# Patient Record
Sex: MALE | Race: WHITE | NOT HISPANIC OR LATINO | Employment: FULL TIME | URBAN - METROPOLITAN AREA
[De-identification: names, ages, dates, MRNs, and addresses within clinical notes are randomized per-mention and may not be internally consistent; named-entity substitution may affect disease eponyms.]

---

## 2017-03-07 ENCOUNTER — ALLSCRIPTS OFFICE VISIT (OUTPATIENT)
Dept: OTHER | Facility: OTHER | Age: 42
End: 2017-03-07

## 2017-04-06 ENCOUNTER — ANESTHESIA EVENT (OUTPATIENT)
Dept: GASTROENTEROLOGY | Facility: AMBULARY SURGERY CENTER | Age: 42
End: 2017-04-06
Payer: COMMERCIAL

## 2017-04-07 ENCOUNTER — GENERIC CONVERSION - ENCOUNTER (OUTPATIENT)
Dept: OTHER | Facility: OTHER | Age: 42
End: 2017-04-07

## 2017-04-07 ENCOUNTER — ANESTHESIA (OUTPATIENT)
Dept: GASTROENTEROLOGY | Facility: AMBULARY SURGERY CENTER | Age: 42
End: 2017-04-07
Payer: COMMERCIAL

## 2017-04-07 ENCOUNTER — HOSPITAL ENCOUNTER (OUTPATIENT)
Facility: AMBULARY SURGERY CENTER | Age: 42
Setting detail: OUTPATIENT SURGERY
Discharge: HOME | End: 2017-04-07
Attending: INTERNAL MEDICINE | Admitting: INTERNAL MEDICINE
Payer: COMMERCIAL

## 2017-04-07 VITALS
WEIGHT: 245 LBS | HEIGHT: 72 IN | OXYGEN SATURATION: 94 % | RESPIRATION RATE: 18 BRPM | DIASTOLIC BLOOD PRESSURE: 82 MMHG | HEART RATE: 74 BPM | SYSTOLIC BLOOD PRESSURE: 134 MMHG | BODY MASS INDEX: 33.18 KG/M2 | TEMPERATURE: 98.8 F

## 2017-04-07 DIAGNOSIS — K21.9 GASTRO-ESOPHAGEAL REFLUX DISEASE WITHOUT ESOPHAGITIS: ICD-10-CM

## 2017-04-07 DIAGNOSIS — Z80.0 FAMILY HISTORY OF MALIGNANT NEOPLASM OF DIGESTIVE ORGAN: ICD-10-CM

## 2017-04-07 DIAGNOSIS — K62.5 HEMORRHAGE OF ANUS AND RECTUM: ICD-10-CM

## 2017-04-07 DIAGNOSIS — Z86.010 HISTORY OF COLONIC POLYPS: ICD-10-CM

## 2017-04-07 PROCEDURE — 88305 TISSUE EXAM BY PATHOLOGIST: CPT | Performed by: INTERNAL MEDICINE

## 2017-04-07 RX ORDER — PROPOFOL 10 MG/ML
INJECTION, EMULSION INTRAVENOUS AS NEEDED
Status: DISCONTINUED | OUTPATIENT
Start: 2017-04-07 | End: 2017-04-07 | Stop reason: SURG

## 2017-04-07 RX ORDER — SODIUM CHLORIDE, SODIUM LACTATE, POTASSIUM CHLORIDE, CALCIUM CHLORIDE 600; 310; 30; 20 MG/100ML; MG/100ML; MG/100ML; MG/100ML
125 INJECTION, SOLUTION INTRAVENOUS CONTINUOUS
Status: DISCONTINUED | OUTPATIENT
Start: 2017-04-07 | End: 2017-04-07 | Stop reason: HOSPADM

## 2017-04-07 RX ORDER — PANTOPRAZOLE SODIUM 40 MG/1
40 TABLET, DELAYED RELEASE ORAL DAILY
Qty: 30 TABLET | Refills: 0 | Status: SHIPPED | OUTPATIENT
Start: 2017-04-07 | End: 2018-10-08

## 2017-04-07 RX ADMIN — SODIUM CHLORIDE, SODIUM LACTATE, POTASSIUM CHLORIDE, AND CALCIUM CHLORIDE: .6; .31; .03; .02 INJECTION, SOLUTION INTRAVENOUS at 08:47

## 2017-04-07 RX ADMIN — PROPOFOL 50 MG: 10 INJECTION, EMULSION INTRAVENOUS at 08:55

## 2017-04-07 RX ADMIN — PROPOFOL 50 MG: 10 INJECTION, EMULSION INTRAVENOUS at 09:06

## 2017-04-07 RX ADMIN — PROPOFOL 50 MG: 10 INJECTION, EMULSION INTRAVENOUS at 09:03

## 2017-04-07 RX ADMIN — PROPOFOL 50 MG: 10 INJECTION, EMULSION INTRAVENOUS at 08:57

## 2017-04-07 RX ADMIN — PROPOFOL 100 MG: 10 INJECTION, EMULSION INTRAVENOUS at 08:53

## 2017-04-07 RX ADMIN — PROPOFOL 50 MG: 10 INJECTION, EMULSION INTRAVENOUS at 08:59

## 2017-04-18 ENCOUNTER — GENERIC CONVERSION - ENCOUNTER (OUTPATIENT)
Dept: OTHER | Facility: OTHER | Age: 42
End: 2017-04-18

## 2018-01-14 VITALS
WEIGHT: 257 LBS | RESPIRATION RATE: 16 BRPM | SYSTOLIC BLOOD PRESSURE: 122 MMHG | HEIGHT: 72 IN | TEMPERATURE: 99.2 F | OXYGEN SATURATION: 97 % | DIASTOLIC BLOOD PRESSURE: 74 MMHG | BODY MASS INDEX: 34.81 KG/M2 | HEART RATE: 80 BPM

## 2018-01-15 NOTE — RESULT NOTES
Verified Results  (1) TISSUE EXAM 07Apr2017 08:56AM Abdullahi Garcia     Test Name Result Flag Reference   LAB AP CASE REPORT (Report)     Surgical Pathology Report             Case: [id number]                   Authorizing Provider: Alondra Schneider MD     Collected:      04/07/2017 0856        Ordering Location:   [facility]  Received:      04/07/2017 [facility]                                     Pathologist:      Amando Zaman MD                             Specimens:  A) - Stomach, bx gastric body                                     B) - Polyp, Colorectal, polyp hepatic flexure   LAB AP FINAL DIAGNOSIS (Report)     A  Gastric body, biopsy:    - Chronic active gastritis  - Many Helicobacter pylori organisms identified on routine H&E stain     - Negative for intestinal metaplasia, dysplasia and malignancy  B  Colon, hepatic flexure polyp:    - Tubular adenoma  - Negative for high grade dysplasia and malignancy  Electronically signed by Amando Zaman MD on 4/10/2017 at 12:22 PM   LAB AP NOTE      Interpretation performed at [facility], [place]   LAB AP SURGICAL ADDITIONAL INFORMATION (Report)     These tests were developed and their performance characteristics   determined by [facility] or [facility]  They may not be cleared or approved by the [place]  Food and   Drug Administration  The FDA has determined that such clearance or   approval is not necessary  These tests are used for clinical purposes  They should not be regarded as investigational or for research  This   laboratory has been approved by CLIA 88, designated as a high-complexity   laboratory and is qualified to perform these tests  LAB AP GROSS DESCRIPTION (Report)     A  The specimen is received in formalin, labeled with the patient's name   and hospital number, and is designated biopsy gastric body   The   specimen consists of 3 tan soft tissue fragments measuring 0 2, 0 2 and   0 5 cm  Entirely submitted  One cassette  Note: The estimated total formalin fixation time based upon information   provided by the submitting clinician and the standard processing schedule   is 19 5 hours  B  The specimen is received in formalin, labeled with the patient's name   and hospital number, and is designated polyp hepatic flexure  The   specimen consists of 3 tan soft tissue fragments measuring 0 2, 0 3 and   0 5 cm  Entirely submitted  One cassette  Note: The estimated total formalin fixation time based upon information   provided by the submitting clinician and the standard processing schedule   is 19 25 hours      MAC       Plan  Helicobacter pylori gastritis    · Start: Amoxicillin 500 MG Oral Tablet; TAKE 2 TABLETS TWICE DAILY UNTIL GONE   · Start: Clarithromycin 500 MG Oral Tablet (Biaxin); TAKE 1 TABLET TWICE DAILY UNTIL  FINISHED   · Start: Pantoprazole Sodium 40 MG Oral Tablet Delayed Release (Protonix); 1 tablet PO  bid for 2 weeks and then take 1 tab PO half an hour before breakfast

## 2018-10-08 ENCOUNTER — HOSPITAL ENCOUNTER (EMERGENCY)
Facility: HOSPITAL | Age: 43
Discharge: HOME | End: 2018-10-08
Attending: EMERGENCY MEDICINE | Admitting: EMERGENCY MEDICINE
Payer: COMMERCIAL

## 2018-10-08 VITALS
OXYGEN SATURATION: 97 % | SYSTOLIC BLOOD PRESSURE: 164 MMHG | DIASTOLIC BLOOD PRESSURE: 81 MMHG | TEMPERATURE: 98 F | BODY MASS INDEX: 33.86 KG/M2 | HEART RATE: 81 BPM | RESPIRATION RATE: 18 BRPM | WEIGHT: 250 LBS | HEIGHT: 72 IN

## 2018-10-08 DIAGNOSIS — L03.011 PARONYCHIA OF FINGER, RIGHT: Primary | ICD-10-CM

## 2018-10-08 PROCEDURE — 99283 EMERGENCY DEPT VISIT LOW MDM: CPT

## 2018-10-08 RX ORDER — LIDOCAINE HYDROCHLORIDE 20 MG/ML
4 INJECTION, SOLUTION EPIDURAL; INFILTRATION; INTRACAUDAL; PERINEURAL ONCE
Status: COMPLETED | OUTPATIENT
Start: 2018-10-08 | End: 2018-10-08

## 2018-10-08 RX ORDER — CEPHALEXIN 500 MG/1
500 CAPSULE ORAL EVERY 6 HOURS SCHEDULED
Qty: 20 CAPSULE | Refills: 0 | Status: SHIPPED | OUTPATIENT
Start: 2018-10-08 | End: 2018-10-13

## 2018-10-08 RX ORDER — CEPHALEXIN 500 MG/1
500 CAPSULE ORAL ONCE
Status: COMPLETED | OUTPATIENT
Start: 2018-10-08 | End: 2018-10-08

## 2018-10-08 RX ADMIN — CEPHALEXIN 500 MG: 500 CAPSULE ORAL at 04:20

## 2018-10-08 RX ADMIN — LIDOCAINE HYDROCHLORIDE 4 ML: 20 INJECTION, SOLUTION EPIDURAL; INFILTRATION; INTRACAUDAL; PERINEURAL at 04:21

## 2018-10-08 NOTE — DISCHARGE INSTRUCTIONS
Paronychia   WHAT YOU NEED TO KNOW:   What is paronychia? Paronychia is an infection of your nail fold caused by bacteria or a fungus  The nail fold is the skin around your nail  Paronychia may happen suddenly and last for 6 weeks or longer  You may have paronychia on more than 1 finger or toe  What increases my risk for paronychia? · Trauma:  Any injury that causes your skin to tear can lead to infection  Your risk is increased if you have ingrown nails, bite your nails, or wear acrylic nails  · Frequent contact with water:  Jobs that require you to soak your hands in water often may increase your risk for paronychia  Common examples are nurses, cooks, and   Swimmers also have increased risk  · Medical conditions:  Diabetes and other conditions that cause a weak immune system can increase your risk  Some examples are skin cancer, psoriasis, HIV, and lupus  · Chemicals:  Contact with soaps, detergents, and other chemicals can cause inflammation and lead to paronychia  · Allergies: Allergies to certain foods, nail polish, or latex can cause inflammation and increase your risk  What are the signs and symptoms of paronychia? · Red, hot, swollen, painful nail fold    · Pus coming out of your nail fold when you press on it    · Nail that pulls away from your nail fold and may fall off    · Changes in nail color, such as green nails    · Fever    · Thick, rough nail, or ridges in the nail  How is paronychia diagnosed? Your healthcare provider will examine your nails and ask about your symptoms  He may press on your infected nail to see if pus drains from it  He will send any pus to a lab for tests to learn what germ is causing your infection  This is called a fluid culture  How is paronychia treated? · Medicine:     ¨ Td vaccine  is a booster shot used to help prevent tetanus and diphtheria   The Td booster may be given to adolescents and adults every 10 years or for certain wounds and injuries  ¨ Antibiotics: This medicine will help fight or prevent an infection caused by bacteria  It may be given as a pill, cream, or ointment  ¨ Steroids: This medicine will help decrease inflammation  It may be given as a pill, cream, or ointment  ¨ Antifungal medicine: This medicine helps kill fungus that may be causing your infection  It may be given as a cream or ointment  ¨ NSAIDs:  These medicines decrease pain and swelling  NSAIDs are available without a doctor's order  Ask your healthcare provider which medicine is right for you  Ask how much to take and when to take it  Take as directed  NSAIDs can cause stomach bleeding and kidney problems if not taken correctly  · Procedures: You may need surgery to drain an abscess (pus pocket) in your finger or toe  Your nail may need to be removed  Infected tissue around your nail may also need to be removed  What are the risks of paronychia? Your nail may become loose, deformed, or fall off  The infection may form a large abscess on your nail  The infection may spread to nearby tissue and bone  How can paronychia be prevented? · Avoid chemicals and allergens that may harm your skin and nails  This includes soaps, laundry detergents, and nail products  · Keep your nails clean and dry  Do not soak your nails in water  Use cotton-lined rubber gloves or wear 2 rubber gloves if you work with food or water  The gloves will help protect your nail folds  · Keep your nails short  Do not bite your nails, pick at your hangnails, suck your fingers, or wear fake nails  Bring your own nail tools when you go to the nail salon  How can I manage my symptoms? · Soak your nail:  Soak your nail in a mixture of equal parts vinegar and water 3 or 4 times each day  This will help decrease inflammation  · Apply a warm compress:  Soak a washcloth in warm water and place it on your nail  This will help decrease inflammation       · Elevate:  Raise your nail above the level of your heart as often as you can  This will help decrease swelling and pain  Prop your nail on pillows or blankets to keep it elevated comfortably  · Use lotion:  Apply lotion after you wash your hands  This will prevent the skin from becoming too dry  When should I contact my healthcare provider? · Your nail becomes loose, deformed, or falls off  · You have a large abscess on your nail  · You have questions or concerns about your condition or care  When should I seek immediate care? · You have severe nail pain  · The inflammation spreads to your hand or arm  CARE AGREEMENT:   You have the right to help plan your care  Learn about your health condition and how it may be treated  Discuss treatment options with your caregivers to decide what care you want to receive  You always have the right to refuse treatment  The above information is an  only  It is not intended as medical advice for individual conditions or treatments  Talk to your doctor, nurse or pharmacist before following any medical regimen to see if it is safe and effective for you  © 2017 [employer]  Information is for End User's use only and may not be sold, redistributed or otherwise used for commercial purposes  All illustrations and images included in CareNotes® are the copyrighted property of A D A M , Inc  or [employer]

## 2018-10-08 NOTE — ED PROVIDER NOTES
History  Chief Complaint   Patient presents with    Finger Swelling     patient c/o parenychia right index finger from biting nails     Pt in ER with c/o pain and swelling to right forefinger that began approx 1wk ago  Pt admits to nailbiting  Pt requests a digital block prior to I & D            None       Past Medical History:   Diagnosis Date    Chronic pain disorder     lumbar     GERD (gastroesophageal reflux disease)     diet controlled    PONV (postoperative nausea and vomiting)     Sleep apnea     does not wear CPAP       Past Surgical History:   Procedure Laterality Date    COLONOSCOPY N/A 4/7/2017    Procedure: COLONOSCOPY;  Surgeon: Cecil Ayala MD;  Location: [facility] GI LAB; Service:     ESOPHAGOGASTRODUODENOSCOPY N/A 4/7/2017    Procedure: ESOPHAGOGASTRODUODENOSCOPY (EGD); Surgeon: Cecil Ayala MD;  Location: [facility] GI LAB; Service:     JOINT REPLACEMENT      KNEE ARTHROSCOPY Left     x2    KNEE ARTHROSCOPY Right     x1    KNEE ARTHROSCOPY W/ AUTOGENOUS CARTILAGE IMPLANTATION (ACI) PROCEDURE Left     SINUS SURGERY      TONSILLECTOMY         Family History   Problem Relation Age of Onset    No Known Problems Mother     No Known Problems Father     Cancer Sister         breast     I have reviewed and agree with the history as documented  Social History   Substance Use Topics    Smoking status: Former Smoker     Packs/day: 0 50     Years: 5 00     Quit date: 2012    Smokeless tobacco: Never Used    Alcohol use Yes      Comment: occas        Review of Systems   Skin: Positive for color change  All other systems reviewed and are negative  Physical Exam  Physical Exam   Constitutional: He appears well-developed and well-nourished  No distress  HENT:   Head: Normocephalic and atraumatic  Musculoskeletal: He exhibits tenderness  He exhibits no deformity  Hands:  Paronychia noted to lateral tip of 2nd finger, with overlying cellulitis   No drainage   Nursing note and vitals reviewed  Vital Signs  ED Triage Vitals [10/08/18 0151]   Temperature Pulse Respirations Blood Pressure SpO2   98 °F (36 7 °C) 81 18 164/81 97 %      Temp Source Heart Rate Source Patient Position - Orthostatic VS BP Location FiO2 (%)   Oral Monitor Lying Right arm --      Pain Score       4           Vitals:    10/08/18 0151   BP: 164/81   Pulse: 81   Patient Position - Orthostatic VS: Lying       Visual Acuity      ED Medications  Medications   lidocaine (PF) (XYLOCAINE-MPF) 2 % injection 4 mL (4 mL Infiltration Given 10/8/18 0421)   cephalexin (KEFLEX) capsule 500 mg (500 mg Oral Given 10/8/18 0420)       Diagnostic Studies  Results Reviewed     None                 No orders to display              Procedures  Incision/Drainage  Date/Time: 10/8/2018 4:25 AM  Performed by: Palmira Jones by: Sherlie Kocher     Patient location:  ED  Other Assisting Provider: No    Consent:     Consent obtained:  Verbal    Consent given by:  Patient    Risks discussed:  Bleeding, incomplete drainage and pain    Alternatives discussed:  No treatment  Universal protocol:     Procedure explained and questions answered to patient or proxy's satisfaction: yes      Site/side marked: yes      Immediately prior to procedure a time out was called: yes      Patient identity confirmed:  Verbally with patient  Location:     Type:  Abscess and fluid collection    Location:  Upper extremity    Upper extremity location:  R index finger  Pre-procedure details:     Skin preparation:  Chloraprep  Anesthesia (see MAR for exact dosages):      Anesthesia method:  Nerve block    Block needle gauge:  27 G    Block anesthetic:  Lidocaine 2% w/o epi    Block injection procedure:  Anatomic landmarks identified, introduced needle, negative aspiration for blood, anatomic landmarks palpated and incremental injection    Block outcome:  Incomplete block  Procedure details:     Complexity:  Simple    Needle aspiration: no Incision types:  Stab incision    Scalpel blade:  11    Approach:  Open    Incision depth:  Subcutaneous    Drainage:  Purulent    Drainage amount: Moderate    Wound treatment:  Wound left open    Packing materials:  None  Post-procedure details:     Patient tolerance of procedure: Tolerated well, no immediate complications           Phone Contacts  ED Phone Contact    ED Course                               MDM  CritCare Time    Disposition  Final diagnoses:   Paronychia of finger, right     Time reflects when diagnosis was documented in both MDM as applicable and the Disposition within this note     Time User Action Codes Description Comment    10/8/2018  4:39 AM Veronique Lobato Add [L03 011] Paronychia of finger, right       ED Disposition     ED Disposition Condition Comment    Discharge  Claudia Loya discharge to home/self care  Condition at discharge: Stable        Follow-up Information     Follow up With Specialties Details Why Pham Holloway  Call in 1 day For wound re-check, for follow up [id number] [facility]          Discharge Medication List as of 10/8/2018  4:41 AM      START taking these medications    Details   cephalexin (KEFLEX) 500 mg capsule Take 1 capsule (500 mg total) by mouth every 6 (six) hours for 5 days, Starting Mon 10/8/2018, Until Sat 10/13/2018, Normal           No discharge procedures on file      ED Provider  Electronically Signed by           Adriana Durbin DO  10/11/18 1134

## 2019-04-08 ENCOUNTER — TRANSCRIBE ORDERS (OUTPATIENT)
Dept: ADMINISTRATIVE | Facility: HOSPITAL | Age: 44
End: 2019-04-08

## 2019-04-09 ENCOUNTER — TRANSCRIBE ORDERS (OUTPATIENT)
Dept: ADMINISTRATIVE | Facility: HOSPITAL | Age: 44
End: 2019-04-09

## 2019-04-09 DIAGNOSIS — M27.9 LESION OF MANDIBLE: Primary | ICD-10-CM

## 2021-07-22 ENCOUNTER — OFFICE VISIT (OUTPATIENT)
Dept: URGENT CARE | Facility: CLINIC | Age: 46
End: 2021-07-22
Payer: COMMERCIAL

## 2021-07-22 ENCOUNTER — TELEPHONE (OUTPATIENT)
Dept: URGENT CARE | Facility: CLINIC | Age: 46
End: 2021-07-22

## 2021-07-22 VITALS
TEMPERATURE: 98.9 F | DIASTOLIC BLOOD PRESSURE: 82 MMHG | WEIGHT: 277 LBS | HEART RATE: 80 BPM | RESPIRATION RATE: 18 BRPM | BODY MASS INDEX: 37.57 KG/M2 | SYSTOLIC BLOOD PRESSURE: 140 MMHG

## 2021-07-22 DIAGNOSIS — M54.41 ACUTE RIGHT-SIDED LOW BACK PAIN WITH RIGHT-SIDED SCIATICA: Primary | ICD-10-CM

## 2021-07-22 PROCEDURE — 99203 OFFICE O/P NEW LOW 30 MIN: CPT | Performed by: PHYSICIAN ASSISTANT

## 2021-07-22 RX ORDER — MELOXICAM 15 MG/1
15 TABLET ORAL DAILY
Qty: 14 TABLET | Refills: 0 | Status: SHIPPED | OUTPATIENT
Start: 2021-07-22 | End: 2021-12-21

## 2021-07-22 RX ORDER — MELOXICAM 7.5 MG/1
15 TABLET ORAL DAILY
Qty: 14 TABLET | Refills: 0 | Status: SHIPPED | OUTPATIENT
Start: 2021-07-22 | End: 2021-07-22

## 2021-07-22 RX ORDER — CYCLOBENZAPRINE HCL 10 MG
10 TABLET ORAL
Qty: 14 TABLET | Refills: 0 | Status: SHIPPED | OUTPATIENT
Start: 2021-07-22 | End: 2021-07-22

## 2021-07-22 RX ORDER — CYCLOBENZAPRINE HCL 10 MG
10 TABLET ORAL
Qty: 14 TABLET | Refills: 0 | Status: SHIPPED | OUTPATIENT
Start: 2021-07-22 | End: 2021-12-21

## 2021-07-22 NOTE — PROGRESS NOTES
[facility]        NAME: Steven Corey is a 51 y o  male  : 1975    MRN: [id number]  DATE: 2021  TIME: 12:59 PM    Assessment and Plan   Acute right-sided low back pain with right-sided sciatica [M54 41]  1  Acute right-sided low back pain with right-sided sciatica  meloxicam (MOBIC) 7 5 mg tablet    cyclobenzaprine (FLEXERIL) 10 mg tablet         Patient Instructions     Patient Instructions   Take NSAID as prescribed for suspected right sided sciatica  Use muscle relaxer at night as needed for discomfort/spasms  Ice/heat to area as tolerated  Avoid aggravating factors such as squatting, bending or lifting  Follow up with PCP  Return or be seen in ER with progressing or worsening symptoms  Follow up with PCP in 3-5 days  Proceed to  ER if symptoms worsen  Chief Complaint   No chief complaint on file  History of Present Illness       Patient is a 52yo M presenting today with R sided low back pain x 1 week  States he was wrestling with his son when he felt a "popping" sensation in his R lower back  He reports a history of back pain in which he has seen a chiropractor and his PCP for his symptoms  Denies ever having imaging done of his back  After experiencing "popping" sensation he notes pain has gradually worsened, is experiencing some tingling down the back of his R leg  States the pain as feeling achy, worse with bending, squatting or prolonged periods of sitting, has been taking aleve with minimal relief  Notes he has been struggling to sleep at night due to the discomfort  He denies any gait abnormalities, weakness, loss of ROM, urinary/bowel incontinence, bruising or swelling  Review of Systems   Review of Systems   Constitutional: Negative for fever  HENT: Negative for sore throat  Eyes: Negative for pain  Respiratory: Negative for cough and shortness of breath  Cardiovascular: Negative for chest pain  Gastrointestinal: Negative for abdominal pain  Genitourinary: Negative for decreased urine volume and difficulty urinating  Musculoskeletal: Positive for back pain (R lower)  Neurological: Negative for weakness and headaches         "tingling" sensation down back of R leg into great toe  Current Medications       Current Outpatient Medications:     cyclobenzaprine (FLEXERIL) 10 mg tablet, Take 1 tablet (10 mg total) by mouth daily at bedtime for 14 days, Disp: 14 tablet, Rfl: 0    meloxicam (MOBIC) 7 5 mg tablet, Take 2 tablets (15 mg total) by mouth daily, Disp: 14 tablet, Rfl: 0    Current Allergies     Allergies as of 07/22/2021 - Reviewed 10/08/2018   Allergen Reaction Noted    Tetracycline Rash 04/05/2017            The following portions of the patient's history were reviewed and updated as appropriate: allergies, current medications, past family history, past medical history, past social history, past surgical history and problem list      Past Medical History:   Diagnosis Date    Chronic pain disorder     lumbar     GERD (gastroesophageal reflux disease)     diet controlled    PONV (postoperative nausea and vomiting)     Sleep apnea     does not wear CPAP       Past Surgical History:   Procedure Laterality Date    COLONOSCOPY N/A 4/7/2017    Procedure: COLONOSCOPY;  Surgeon: Bc Ardon MD;  Location[facility] GI LAB; Service:     ESOPHAGOGASTRODUODENOSCOPY N/A 4/7/2017    Procedure: ESOPHAGOGASTRODUODENOSCOPY (EGD); Surgeon: Bc Ardon MD;  Location: [facility] GI LAB; Service:     JOINT REPLACEMENT      KNEE ARTHROSCOPY Left     x2    KNEE ARTHROSCOPY Right     x1    KNEE ARTHROSCOPY W/ AUTOGENOUS CARTILAGE IMPLANTATION (ACI) PROCEDURE Left     SINUS SURGERY      TONSILLECTOMY         Family History   Problem Relation Age of Onset    No Known Problems Mother     No Known Problems Father     Cancer Sister         breast         Medications have been verified          Objective   /82 (BP Location: Left arm, Patient Position: Standing)   Pulse 80   Temp 98 9 °F (37 2 °C)   Resp 18   Wt 126 kg (277 lb)   BMI 37 57 kg/m²        Physical Exam     Physical Exam  Vitals reviewed  Constitutional:       General: He is not in acute distress  Appearance: Normal appearance  Comments: Patient appears uncomfortable, constantly adjusting position in seat, states too uncomfortable to climb onto exam table  HENT:      Head: Normocephalic and atraumatic  Right Ear: External ear normal       Left Ear: External ear normal    Cardiovascular:      Rate and Rhythm: Normal rate and regular rhythm  Pulses: Normal pulses  Pulmonary:      Effort: Pulmonary effort is normal    Musculoskeletal:      Cervical back: Normal range of motion  Comments: No obvious deformity, bruising or swelling of low back  No spinous process tenderness  R sided tenderness of low back, spasm elicited upon palpation of R upper buttock  5/5 strength of lower extremities bilaterally  Mild tenderness upon flexion and extension of R hip  No gait abnormalities  Gross sensation intact  Normal DTRs  Skin:     General: Skin is warm and dry  Capillary Refill: Capillary refill takes less than 2 seconds  Neurological:      General: No focal deficit present  Mental Status: He is alert and oriented to person, place, and time  Sensory: No sensory deficit  Motor: No weakness        Gait: Gait normal       Deep Tendon Reflexes: Reflexes normal

## 2021-07-23 DIAGNOSIS — M54.41 ACUTE RIGHT-SIDED LOW BACK PAIN WITH RIGHT-SIDED SCIATICA: Primary | ICD-10-CM

## 2021-07-23 RX ORDER — METHYLPREDNISOLONE 4 MG/1
TABLET ORAL
Qty: 21 TABLET | Refills: 0 | Status: SHIPPED | OUTPATIENT
Start: 2021-07-23 | End: 2021-12-21

## 2021-07-23 NOTE — TELEPHONE ENCOUNTER
Patient called explaining persistent pain in back  Discussed with patient adding on steroid pack for symptoms during visit on 7/22  Sent prescription to pharmacy

## 2021-11-05 ENCOUNTER — CONSULT (OUTPATIENT)
Dept: GASTROENTEROLOGY | Facility: CLINIC | Age: 46
End: 2021-11-05
Payer: COMMERCIAL

## 2021-11-05 ENCOUNTER — TELEPHONE (OUTPATIENT)
Dept: GASTROENTEROLOGY | Facility: CLINIC | Age: 46
End: 2021-11-05

## 2021-11-05 VITALS
BODY MASS INDEX: 37.93 KG/M2 | WEIGHT: 280 LBS | TEMPERATURE: 99 F | HEART RATE: 80 BPM | DIASTOLIC BLOOD PRESSURE: 80 MMHG | HEIGHT: 72 IN | SYSTOLIC BLOOD PRESSURE: 120 MMHG

## 2021-11-05 DIAGNOSIS — R10.13 EPIGASTRIC PAIN: ICD-10-CM

## 2021-11-05 DIAGNOSIS — K62.5 RECTAL BLEEDING: Primary | ICD-10-CM

## 2021-11-05 PROCEDURE — 99203 OFFICE O/P NEW LOW 30 MIN: CPT | Performed by: PHYSICIAN ASSISTANT

## 2021-12-21 ENCOUNTER — TELEPHONE (OUTPATIENT)
Dept: PREADMISSION TESTING | Facility: HOSPITAL | Age: 46
End: 2021-12-21

## 2021-12-21 NOTE — PRE-PROCEDURE INSTRUCTIONS
No outpatient medications have been marked as taking for the 12/21/21 encounter (Telephone) with Anabel Wetzel RN  Pt is not taking any Rx nor OTC medications at this time

## 2022-01-04 ENCOUNTER — HOSPITAL ENCOUNTER (OUTPATIENT)
Dept: GASTROENTEROLOGY | Facility: AMBULARY SURGERY CENTER | Age: 47
Setting detail: OUTPATIENT SURGERY
Discharge: HOME | End: 2022-01-04
Attending: INTERNAL MEDICINE | Admitting: INTERNAL MEDICINE
Payer: COMMERCIAL

## 2022-01-04 ENCOUNTER — ANESTHESIA (OUTPATIENT)
Dept: GASTROENTEROLOGY | Facility: AMBULARY SURGERY CENTER | Age: 47
End: 2022-01-04

## 2022-01-04 ENCOUNTER — ANESTHESIA EVENT (OUTPATIENT)
Dept: GASTROENTEROLOGY | Facility: AMBULARY SURGERY CENTER | Age: 47
End: 2022-01-04

## 2022-01-04 VITALS
DIASTOLIC BLOOD PRESSURE: 62 MMHG | RESPIRATION RATE: 18 BRPM | TEMPERATURE: 97.3 F | SYSTOLIC BLOOD PRESSURE: 119 MMHG | HEART RATE: 77 BPM | OXYGEN SATURATION: 98 % | BODY MASS INDEX: 37.97 KG/M2 | HEIGHT: 72 IN

## 2022-01-04 DIAGNOSIS — R10.13 EPIGASTRIC PAIN: ICD-10-CM

## 2022-01-04 DIAGNOSIS — K62.5 RECTAL BLEEDING: ICD-10-CM

## 2022-01-04 PROCEDURE — 43239 EGD BIOPSY SINGLE/MULTIPLE: CPT | Performed by: INTERNAL MEDICINE

## 2022-01-04 PROCEDURE — 88305 TISSUE EXAM BY PATHOLOGIST: CPT | Performed by: PATHOLOGY

## 2022-01-04 PROCEDURE — 88342 IMHCHEM/IMCYTCHM 1ST ANTB: CPT | Performed by: PATHOLOGY

## 2022-01-04 PROCEDURE — 45378 DIAGNOSTIC COLONOSCOPY: CPT | Performed by: INTERNAL MEDICINE

## 2022-01-04 RX ORDER — LIDOCAINE HYDROCHLORIDE 20 MG/ML
INJECTION, SOLUTION EPIDURAL; INFILTRATION; INTRACAUDAL; PERINEURAL AS NEEDED
Status: DISCONTINUED | OUTPATIENT
Start: 2022-01-04 | End: 2022-01-04

## 2022-01-04 RX ORDER — PROPOFOL 10 MG/ML
INJECTION, EMULSION INTRAVENOUS CONTINUOUS PRN
Status: DISCONTINUED | OUTPATIENT
Start: 2022-01-04 | End: 2022-01-04

## 2022-01-04 RX ORDER — PROPOFOL 10 MG/ML
INJECTION, EMULSION INTRAVENOUS AS NEEDED
Status: DISCONTINUED | OUTPATIENT
Start: 2022-01-04 | End: 2022-01-04

## 2022-01-04 RX ORDER — SODIUM CHLORIDE, SODIUM LACTATE, POTASSIUM CHLORIDE, CALCIUM CHLORIDE 600; 310; 30; 20 MG/100ML; MG/100ML; MG/100ML; MG/100ML
125 INJECTION, SOLUTION INTRAVENOUS CONTINUOUS
Status: DISCONTINUED | OUTPATIENT
Start: 2022-01-04 | End: 2022-01-08 | Stop reason: HOSPADM

## 2022-01-04 RX ADMIN — LIDOCAINE HYDROCHLORIDE 100 MG: 20 INJECTION, SOLUTION EPIDURAL; INFILTRATION; INTRACAUDAL; PERINEURAL at 11:14

## 2022-01-04 RX ADMIN — PROPOFOL 140 MCG/KG/MIN: 10 INJECTION, EMULSION INTRAVENOUS at 11:14

## 2022-01-04 RX ADMIN — PROPOFOL 100 MG: 10 INJECTION, EMULSION INTRAVENOUS at 11:14

## 2022-01-04 RX ADMIN — SODIUM CHLORIDE, SODIUM LACTATE, POTASSIUM CHLORIDE, AND CALCIUM CHLORIDE 125 ML/HR: .6; .31; .03; .02 INJECTION, SOLUTION INTRAVENOUS at 09:56

## 2022-01-04 RX ADMIN — PROPOFOL 200 MG: 10 INJECTION, EMULSION INTRAVENOUS at 11:11

## 2022-01-04 NOTE — H&P
History and Physical - [facility]  [facility] 51 y o  male MRN: [id number]        HPI:  59-year-old male history of H pylori gastritis, colon polyps reports having epigastric pain and rectal bleeding mixed with the stool  Historical Information   Past Medical History:   Diagnosis Date    Chronic pain disorder     lumbar     CPAP (continuous positive airway pressure) dependence     GERD (gastroesophageal reflux disease)     diet controlled    Sleep apnea     does not wear CPAP     Past Surgical History:   Procedure Laterality Date    COLONOSCOPY N/A 4/7/2017    Procedure: COLONOSCOPY;  Surgeon: Ashley Shirley MD;  Location: [place] [address] GI LAB; Service:     ESOPHAGOGASTRODUODENOSCOPY N/A 4/7/2017    Procedure: ESOPHAGOGASTRODUODENOSCOPY (EGD); Surgeon: Ashley Shirley MD;  Location: [facility] GI LAB; Service:     KNEE ARTHROSCOPY Left     x4 and ACL repair    KNEE ARTHROSCOPY Right     x2 and ACL repair    KNEE ARTHROSCOPY W/ AUTOGENOUS CARTILAGE IMPLANTATION (ACI) PROCEDURE Left     SINUS SURGERY      TONSILLECTOMY      UPPER GASTROINTESTINAL ENDOSCOPY       Social History   Social History     Substance and Sexual Activity   Alcohol Use Yes    Comment: daily     Social History     Substance and Sexual Activity   Drug Use No     Social History     Tobacco Use   Smoking Status Former Smoker    Packs/day: 0 50    Years: 5 00    Pack years: 2 50    Quit date: 2012    Years since quitting: 10 0   Smokeless Tobacco Never Used     Family History   Problem Relation Age of Onset    No Known Problems Mother     No Known Problems Father     Cancer Sister         breast       Meds/Allergies     (Not in a hospital admission)      Allergies   Allergen Reactions    Tetracycline Rash     Severe itching       Objective     Blood pressure 151/92, pulse 68, temperature (!) 97 3 °F (36 3 °C), resp  rate 18, height 6' (1 829 m), SpO2 97 %      PHYSICAL EXAM:    Gen: NAD  CV: S1 & S2 normal, RRR  CHEST: Clear to auscultate  ABD: soft, NT/ND, good bowel sounds  EXT: no edema    ASSESSMENT:     History of colon polyps, epigastric pain, rectal bleeding    PLAN:    EGD and colonoscopy

## 2022-01-04 NOTE — ANESTHESIA PREPROCEDURE EVALUATION
Procedure:  COLONOSCOPY  EGD    Relevant Problems   GI/HEPATIC   (+) GERD (gastroesophageal reflux disease)      NEURO/PSYCH   (+) Chronic pain disorder      PULMONARY   (+) Sleep apnea      Other   (+) CPAP (continuous positive airway pressure) dependence        Physical Exam    Airway    Mallampati score: III  TM Distance: >3 FB  Neck ROM: full     Dental   No notable dental hx     Cardiovascular      Pulmonary      Other Findings        Anesthesia Plan  ASA Score- 2     Anesthesia Type- IV sedation with anesthesia with ASA Monitors  Additional Monitors:   Airway Plan:     Comment: I discussed the risks and benefits of IV sedation anesthesia including the possibility of the need to convert to general anesthesia and the potential risk of awareness  Plan Factors-Exercise tolerance (METS): >4 METS  Exercise comment: Able to climb two flights of stairs without cardiopulmonary limitation  Chart reviewed  Existing labs reviewed  Patient summary reviewed  Patient is not a current smoker  Patient did not smoke on day of surgery  Induction- intravenous  Postoperative Plan-     Informed Consent- Anesthetic plan and risks discussed with patient

## 2022-01-11 DIAGNOSIS — A04.8 H. PYLORI INFECTION: ICD-10-CM

## 2022-01-12 RX ORDER — OMEPRAZOLE 40 MG/1
CAPSULE, DELAYED RELEASE ORAL
Qty: 28 CAPSULE | Refills: 0 | Status: SHIPPED | OUTPATIENT
Start: 2022-01-12

## 2023-07-13 ENCOUNTER — OFFICE VISIT (OUTPATIENT)
Dept: FAMILY MEDICINE CLINIC | Facility: CLINIC | Age: 48
End: 2023-07-13
Payer: COMMERCIAL

## 2023-07-13 VITALS
HEIGHT: 71 IN | DIASTOLIC BLOOD PRESSURE: 84 MMHG | WEIGHT: 283 LBS | BODY MASS INDEX: 39.62 KG/M2 | SYSTOLIC BLOOD PRESSURE: 142 MMHG | HEART RATE: 104 BPM | RESPIRATION RATE: 18 BRPM | TEMPERATURE: 97.7 F

## 2023-07-13 DIAGNOSIS — I10 BENIGN ESSENTIAL HYPERTENSION: ICD-10-CM

## 2023-07-13 DIAGNOSIS — Z11.59 NEED FOR HEPATITIS C SCREENING TEST: ICD-10-CM

## 2023-07-13 DIAGNOSIS — Z13.6 SCREENING FOR CARDIOVASCULAR CONDITION: ICD-10-CM

## 2023-07-13 DIAGNOSIS — M17.0 PRIMARY OSTEOARTHRITIS OF BOTH KNEES: ICD-10-CM

## 2023-07-13 DIAGNOSIS — Z12.5 SCREENING FOR PROSTATE CANCER: ICD-10-CM

## 2023-07-13 DIAGNOSIS — Z23 NEED FOR VACCINATION: ICD-10-CM

## 2023-07-13 DIAGNOSIS — G89.4 CHRONIC PAIN DISORDER: ICD-10-CM

## 2023-07-13 DIAGNOSIS — F90.0 ADHD, PREDOMINANTLY INATTENTIVE TYPE: Primary | ICD-10-CM

## 2023-07-13 PROBLEM — B96.81 HELICOBACTER PYLORI GASTRITIS: Status: RESOLVED | Noted: 2017-04-18 | Resolved: 2023-07-13

## 2023-07-13 PROBLEM — B96.81 HELICOBACTER PYLORI GASTRITIS: Status: ACTIVE | Noted: 2017-04-18

## 2023-07-13 PROBLEM — K29.70 HELICOBACTER PYLORI GASTRITIS: Status: RESOLVED | Noted: 2017-04-18 | Resolved: 2023-07-13

## 2023-07-13 PROBLEM — K29.70 HELICOBACTER PYLORI GASTRITIS: Status: ACTIVE | Noted: 2017-04-18

## 2023-07-13 PROCEDURE — 99204 OFFICE O/P NEW MOD 45 MIN: CPT | Performed by: FAMILY MEDICINE

## 2023-07-13 PROCEDURE — 90471 IMMUNIZATION ADMIN: CPT

## 2023-07-13 PROCEDURE — 90715 TDAP VACCINE 7 YRS/> IM: CPT

## 2023-07-13 RX ORDER — DEXTROAMPHETAMINE/AMPHETAMINE 10 MG
CAPSULE, EXT RELEASE 24 HR ORAL
COMMUNITY
Start: 2023-06-12 | End: 2023-07-13 | Stop reason: SDUPTHER

## 2023-07-13 RX ORDER — VALSARTAN 80 MG/1
80 TABLET ORAL EVERY MORNING
COMMUNITY
Start: 2023-06-08

## 2023-07-13 RX ORDER — DEXTROAMPHETAMINE/AMPHETAMINE 10 MG
10 CAPSULE, EXT RELEASE 24 HR ORAL EVERY MORNING
Qty: 30 CAPSULE | Refills: 0 | Status: SHIPPED | OUTPATIENT
Start: 2023-07-13

## 2023-07-13 NOTE — PROGRESS NOTES
Assessment/Plan:    1. ADHD, predominantly inattentive type  Assessment & Plan:  Pt states he takes this for focus - I will fill for him till he can see psychiatry to take over    Orders:  -     Ambulatory referral to Psychiatry; Future  -     ADDERALL XR, 10MG, 10 MG 24 hr capsule; Take 1 capsule (10 mg total) by mouth every morning Max Daily Amount: 10 mg    2. Benign essential hypertension  Assessment & Plan:  Pt has not been taking Blood pressure meds, will start up his meds and I will follow      3. Chronic pain disorder  Assessment & Plan:  Pt states he has a bad back     Orders:  -     PSA, Total Screen; Future    4. Screening for prostate cancer  -     CBC; Future  -     Comprehensive metabolic panel; Future  -     Lipid Panel with Direct LDL reflex; Future    5. Screening for cardiovascular condition    6. Need for hepatitis C screening test  -     Hepatitis C antibody; Future    7. Primary osteoarthritis of both knees  -     Ambulatory referral to Orthopedic Surgery; Future    8. Need for vaccination  -     TDAP VACCINE GREATER THAN OR EQUAL TO 6YO IM            There are no Patient Instructions on file for this visit. No follow-ups on file. Subjective:      Patient ID: Eyad Gabriel is a 51 y.o. male. Chief Complaint   Patient presents with   • Establish Care     Lw shayan       Pt is here for the first time to establish  Pt was seeing a doc in [facility] - transferring to our ioffice    Pt states his knees are a mess and he will be following up with ortho.   Pt states his knees have been a problem for years dx with arthritis          The following portions of the patient's history were reviewed and updated as appropriate: allergies, current medications, past family history, past medical history, past social history, past surgical history and problem list.    Review of Systems   Constitutional: Negative for activity change, appetite change, chills, diaphoresis, fatigue, fever and unexpected weight change. HENT: Negative for congestion, dental problem, ear pain, mouth sores, sinus pressure, sinus pain, sore throat and trouble swallowing. Eyes: Negative for photophobia, discharge and itching. Respiratory: Negative for apnea, chest tightness and shortness of breath. Cardiovascular: Negative for chest pain, palpitations and leg swelling. Gastrointestinal: Negative for abdominal distention, abdominal pain, blood in stool, nausea and vomiting. Endocrine: Negative for cold intolerance, heat intolerance, polydipsia, polyphagia and polyuria. Genitourinary: Negative for difficulty urinating. Musculoskeletal: Positive for arthralgias. Skin: Negative for color change and wound. Neurological: Negative for dizziness, syncope, speech difficulty and headaches. Hematological: Negative for adenopathy. Psychiatric/Behavioral: Positive for decreased concentration. Negative for agitation and behavioral problems. Current Outpatient Medications   Medication Sig Dispense Refill   • ADDERALL XR, 10MG, 10 MG 24 hr capsule Take 1 capsule (10 mg total) by mouth every morning Max Daily Amount: 10 mg 30 capsule 0   • valsartan (DIOVAN) 80 mg tablet Take 80 mg by mouth every morning       No current facility-administered medications for this visit. Objective:    /84   Pulse 104   Temp 97.7 °F (36.5 °C) (Tympanic)   Resp 18   Ht 5' 11" (1.803 m)   Wt 128 kg (283 lb)   BMI 39.47 kg/m²        Physical Exam  Vitals and nursing note reviewed. Constitutional:       General: He is not in acute distress. Appearance: He is well-developed. He is not diaphoretic. HENT:      Head: Normocephalic and atraumatic. Right Ear: External ear normal.      Left Ear: External ear normal.      Nose: Nose normal.      Mouth/Throat:      Pharynx: No oropharyngeal exudate. Eyes:      General: No scleral icterus. Right eye: No discharge. Left eye: No discharge.       Pupils: Pupils are equal, round, and reactive to light. Neck:      Thyroid: No thyromegaly. Cardiovascular:      Rate and Rhythm: Normal rate. Heart sounds: Normal heart sounds. No murmur heard. Pulmonary:      Effort: Pulmonary effort is normal. No respiratory distress. Breath sounds: Normal breath sounds. No wheezing. Abdominal:      General: Bowel sounds are normal. There is no distension. Palpations: Abdomen is soft. There is no mass. Tenderness: There is no abdominal tenderness. There is no guarding or rebound. Musculoskeletal:         General: Normal range of motion. Skin:     General: Skin is warm and dry. Findings: No erythema or rash. Neurological:      Mental Status: He is alert.       Coordination: Coordination normal.      Deep Tendon Reflexes: Reflexes normal.   Psychiatric:         Behavior: Behavior normal.                Keisha Harmony, DO

## 2023-07-17 ENCOUNTER — TELEPHONE (OUTPATIENT)
Dept: PSYCHIATRY | Facility: CLINIC | Age: 48
End: 2023-07-17

## 2023-07-17 NOTE — TELEPHONE ENCOUNTER
Contacted patient in regards to Routine Referral in attempts to verify patient's needs of services and add patient to proper wait list. spoke with patient whom stated they are interested in services for ADHD medication       MEDICATION MANAGEMENT  Yoav/ Olinda/ Mahsa   PATIENT REQUEST NOTHING MAILED TO HOME

## 2023-09-21 DIAGNOSIS — F90.0 ADHD, PREDOMINANTLY INATTENTIVE TYPE: ICD-10-CM

## 2023-09-21 RX ORDER — DEXTROAMPHETAMINE/AMPHETAMINE 10 MG
10 CAPSULE, EXT RELEASE 24 HR ORAL EVERY MORNING
Qty: 30 CAPSULE | Refills: 0 | Status: SHIPPED | OUTPATIENT
Start: 2023-09-21

## 2023-09-21 NOTE — TELEPHONE ENCOUNTER
Medication: Adderall XR 10 mg capsule     PDMP
Reason for call:   [x] Refill   [] Prior Auth  [] Other:     Office:   [x] PCP/Provider -  Binu Better, DO / PCP  [] Speciality/Provider -     Medication: ADDERALL XR, 10MG, 24 hr capsule    Frequency: Take 1 capsule (10 mg total) by mouth every morning     Quantity: #30     Pharmacy: [address]T  [phone]    Does the patient have enough for 3 days?    [x] Yes   [] No - Send as HP to POD
Yes

## 2023-09-28 ENCOUNTER — TELEPHONE (OUTPATIENT)
Dept: FAMILY MEDICINE CLINIC | Facility: CLINIC | Age: 48
End: 2023-09-28

## 2023-09-28 DIAGNOSIS — R73.09 ABNORMAL GLUCOSE: Primary | ICD-10-CM

## 2023-09-28 LAB
ALBUMIN SERPL-MCNC: 4.9 G/DL (ref 4.1–5.1)
ALBUMIN/GLOB SERPL: 2 {RATIO} (ref 1.2–2.2)
ALP SERPL-CCNC: 51 IU/L (ref 44–121)
ALT SERPL-CCNC: 45 IU/L (ref 0–44)
AST SERPL-CCNC: 28 IU/L (ref 0–40)
BASOPHILS # BLD AUTO: 0.1 X10E3/UL (ref 0–0.2)
BASOPHILS NFR BLD AUTO: 1 %
BILIRUB SERPL-MCNC: 0.7 MG/DL (ref 0–1.2)
BUN SERPL-MCNC: 13 MG/DL (ref 6–24)
BUN/CREAT SERPL: 12 (ref 9–20)
CALCIUM SERPL-MCNC: 9.6 MG/DL (ref 8.7–10.2)
CHLORIDE SERPL-SCNC: 99 MMOL/L (ref 96–106)
CHOLEST SERPL-MCNC: 228 MG/DL (ref 100–199)
CO2 SERPL-SCNC: 23 MMOL/L (ref 20–29)
CREAT SERPL-MCNC: 1.08 MG/DL (ref 0.76–1.27)
EGFR: 84 ML/MIN/1.73
EOSINOPHIL # BLD AUTO: 0.2 X10E3/UL (ref 0–0.4)
EOSINOPHIL NFR BLD AUTO: 4 %
ERYTHROCYTE [DISTWIDTH] IN BLOOD BY AUTOMATED COUNT: 12.2 % (ref 11.6–15.4)
GLOBULIN SER-MCNC: 2.5 G/DL (ref 1.5–4.5)
GLUCOSE SERPL-MCNC: 132 MG/DL (ref 70–99)
HCT VFR BLD AUTO: 46.5 % (ref 37.5–51)
HCV AB S/CO SERPL IA: NON REACTIVE
HDLC SERPL-MCNC: 38 MG/DL
HGB BLD-MCNC: 16.1 G/DL (ref 13–17.7)
IMM GRANULOCYTES # BLD: 0 X10E3/UL (ref 0–0.1)
IMM GRANULOCYTES NFR BLD: 0 %
LDLC SERPL CALC-MCNC: 166 MG/DL (ref 0–99)
LYMPHOCYTES # BLD AUTO: 2 X10E3/UL (ref 0.7–3.1)
LYMPHOCYTES NFR BLD AUTO: 35 %
MCH RBC QN AUTO: 31.3 PG (ref 26.6–33)
MCHC RBC AUTO-ENTMCNC: 34.6 G/DL (ref 31.5–35.7)
MCV RBC AUTO: 91 FL (ref 79–97)
MICRODELETION SYND BLD/T FISH: NORMAL
MONOCYTES # BLD AUTO: 0.7 X10E3/UL (ref 0.1–0.9)
MONOCYTES NFR BLD AUTO: 13 %
NEUTROPHILS # BLD AUTO: 2.6 X10E3/UL (ref 1.4–7)
NEUTROPHILS NFR BLD AUTO: 47 %
PLATELET # BLD AUTO: 200 X10E3/UL (ref 150–450)
POTASSIUM SERPL-SCNC: 4.4 MMOL/L (ref 3.5–5.2)
PROT SERPL-MCNC: 7.4 G/DL (ref 6–8.5)
PSA SERPL-MCNC: 0.5 NG/ML (ref 0–4)
RBC # BLD AUTO: 5.14 X10E6/UL (ref 4.14–5.8)
SODIUM SERPL-SCNC: 137 MMOL/L (ref 134–144)
TRIGL SERPL-MCNC: 129 MG/DL (ref 0–149)
WBC # BLD AUTO: 5.6 X10E3/UL (ref 3.4–10.8)

## 2023-09-28 NOTE — TELEPHONE ENCOUNTER
Please call pt he had elevated sugars and lipids, I would like to see in office to review. I ould like to have a Hbg A1c drawn before his appt.   I will place order

## 2023-09-29 ENCOUNTER — OFFICE VISIT (OUTPATIENT)
Dept: FAMILY MEDICINE CLINIC | Facility: CLINIC | Age: 48
End: 2023-09-29
Payer: COMMERCIAL

## 2023-09-29 VITALS
HEIGHT: 71 IN | RESPIRATION RATE: 18 BRPM | TEMPERATURE: 97.3 F | BODY MASS INDEX: 39.62 KG/M2 | WEIGHT: 283 LBS | DIASTOLIC BLOOD PRESSURE: 86 MMHG | OXYGEN SATURATION: 96 % | SYSTOLIC BLOOD PRESSURE: 146 MMHG | HEART RATE: 78 BPM

## 2023-09-29 DIAGNOSIS — E78.2 MIXED HYPERLIPIDEMIA: ICD-10-CM

## 2023-09-29 DIAGNOSIS — R68.82 LIBIDO, DECREASED: ICD-10-CM

## 2023-09-29 DIAGNOSIS — E66.01 SEVERE OBESITY (BMI 35.0-39.9) WITH COMORBIDITY (HCC): ICD-10-CM

## 2023-09-29 DIAGNOSIS — I10 BENIGN ESSENTIAL HYPERTENSION: Primary | ICD-10-CM

## 2023-09-29 DIAGNOSIS — R73.09 ABNORMAL GLUCOSE: ICD-10-CM

## 2023-09-29 DIAGNOSIS — J20.9 ACUTE BRONCHITIS, UNSPECIFIED ORGANISM: ICD-10-CM

## 2023-09-29 PROCEDURE — 99214 OFFICE O/P EST MOD 30 MIN: CPT | Performed by: FAMILY MEDICINE

## 2023-09-29 RX ORDER — AZITHROMYCIN 250 MG/1
TABLET, FILM COATED ORAL
Qty: 6 TABLET | Refills: 0 | Status: SHIPPED | OUTPATIENT
Start: 2023-09-29 | End: 2023-10-04

## 2023-09-29 RX ORDER — TADALAFIL 20 MG/1
20 TABLET ORAL DAILY PRN
Qty: 10 TABLET | Refills: 5 | Status: SHIPPED | OUTPATIENT
Start: 2023-09-29

## 2023-09-29 RX ORDER — METHYLPREDNISOLONE 4 MG/1
TABLET ORAL
Qty: 21 EACH | Refills: 0 | Status: SHIPPED | OUTPATIENT
Start: 2023-09-29

## 2023-09-29 NOTE — PATIENT INSTRUCTIONS
Obesity   AMBULATORY CARE:   Obesity  means your body mass index (BMI) is greater than 30. Your healthcare provider will use your age, height, and weight to measure your BMI. The risks of obesity include  many health problems, including injuries or physical disability. • Diabetes (high blood sugar level)    • High blood pressure or high cholesterol    • Heart disease or heart failure    • Stroke    • Gallbladder or liver disease    • Cancer of the colon, breast, prostate, liver, or kidney    • Sleep apnea    • Arthritis or gout    Screening  is done to check for health conditions before you have signs or symptoms. If you are 28to 79years old, your blood sugar level may be checked every 3 years for signs of prediabetes or diabetes. Your healthcare provider will check your blood pressure at each visit. High blood pressure can lead to a stroke or other problems. Your provider may check for signs of heart disease, cancer, or other health problems. Seek care immediately if:   • You have a severe headache, confusion, or difficulty speaking. • You have weakness on one side of your body. • You have chest pain, sweating, or shortness of breath. Call your doctor if:   • You have symptoms of gallbladder or liver disease, such as pain in your upper abdomen. • You have knee or hip pain and discomfort while walking. • You have symptoms of diabetes, such as intense hunger and thirst, and frequent urination. • You have symptoms of sleep apnea, such as snoring or daytime sleepiness. • You have questions or concerns about your condition or care. Treatment for obesity  focuses on helping you lose weight to improve your health. Even a small decrease in BMI can reduce the risk for many health problems. Your healthcare provider will help you set a weight-loss goal.  • Lifestyle changes  are the first step in treating obesity. These include making healthy food choices and getting regular physical activity. Your healthcare provider may suggest a weight-loss program that involves coaching, education, and therapy. • Medicine  may help you lose weight when it is used with a healthy foods and physical activity. • Surgery  can help you lose weight if you have obesity along with other health problems. Several types of weight-loss surgery are available. Ask your healthcare provider for more information. Tips for safe weight loss:   • Set small, realistic goals. An example of a small goal is to walk for 20 minutes 5 days a week. Anther goal is to lose 5% of your body weight. • Ask for support. Tell friends, family members, and coworkers about your goals. Ask someone to lose weight with you. You may also want to join a weight-loss support group. • Identify foods or triggers that may cause you to overeat. Remove tempting high-calorie foods from your home and workplace. Place a bowl of fresh fruit on your kitchen counter. If stress causes you to eat, find other ways to cope with stress. A counselor or therapist may be able to help you. • Track your daily calories and activity. Write down what you eat and drink. Also write down how many minutes of physical activity you do each day. • Track your weekly weight. Weigh yourself in the morning, before you eat or drink anything but after you use the bathroom. Use the same scale, in the same place, and in similar clothing each time. Only weigh yourself 1 to 2 times each week, or as directed. You may become discouraged if you weigh yourself every day. Eating changes: You will need to eat 500 to 1,000 fewer calories each day than you currently eat to lose 1 to 2 pounds a week. The following changes will help you cut calories:  • Eat smaller portions. Use small plates, no larger than 9 inches in diameter. Fill your plate half full of fruits and vegetables. Measure your food using measuring cups until you know what a serving size looks like.          • Eat 3 meals and 1 or 2 snacks each day. Plan your meals in advance. Michelle Mirza and eat at home most of the time. Eat slowly. Do not skip meals. Skipping meals can lead to overeating later in the day. This can make it harder for you to lose weight. Talk with a dietitian to help you make a meal plan and schedule that is right for you. • Eat fruits and vegetables at every meal.  They are low in calories and high in fiber, which makes you feel full. Do not add butter, margarine, or cream sauce to vegetables. Use herbs to season steamed vegetables. • Eat less fat and fewer fried foods. Eat more baked or grilled chicken and fish. These protein sources are lower in calories and fat than red meat. Limit fast food. Dress your salads with olive oil and vinegar instead of bottled dressing. • Limit the amount of sugar you eat. Do not drink sugary beverages. Limit alcohol. Activity changes:  Physical activity is good for your body in many ways. It helps you burn calories and build strong muscles. It decreases stress and depression, and improves your mood. It can also help you sleep better. Talk to your healthcare provider before you begin an exercise program.  • Exercise for at least 30 minutes 5 days a week. Start slowly. Set aside time each day for physical activity that you enjoy and that is convenient for you. It is best to do both weight training and an activity that increases your heart rate, such as walking, bicycling, or swimming. • Find ways to be more active. Do yard work and housecleaning. Walk up the stairs instead of using elevators. Spend your leisure time going to events that require walking, such as outdoor festivals or fairs. This extra physical activity can help you lose weight and keep it off. Follow up with your doctor as directed: You may need to meet with a dietitian. Write down your questions so you remember to ask them during your visits.   © Copyright Kourtney Loges 2023 Information is for End User's use only and may not be sold, redistributed or otherwise used for commercial purposes. The above information is an  only. It is not intended as medical advice for individual conditions or treatments. Talk to your doctor, nurse or pharmacist before following any medical regimen to see if it is safe and effective for you.

## 2023-09-29 NOTE — ASSESSMENT & PLAN NOTE
Pt has been off the diovan for a few weeks.     He is going to start it back up
Was placed on meds in the past but did not take wants to do diet and exercise.
sent email to [facility]

## 2023-09-29 NOTE — PROGRESS NOTES
Assessment/Plan:    1. Benign essential hypertension  Assessment & Plan:  Pt has been off the diovan for a few weeks. He is going to start it back up    Orders:  -     Comprehensive metabolic panel; Future; Expected date: 03/12/2024  -     Lipid Panel with Direct LDL reflex; Future; Expected date: 03/12/2024    2. Abnormal glucose  -     Comprehensive metabolic panel; Future; Expected date: 03/12/2024  -     Lipid Panel with Direct LDL reflex; Future; Expected date: 03/12/2024    3. Mixed hyperlipidemia  Assessment & Plan:  Was placed on meds in the past but did not take wants to do diet and exercise. Orders:  -     Comprehensive metabolic panel; Future; Expected date: 03/12/2024  -     Lipid Panel with Direct LDL reflex; Future; Expected date: 03/12/2024  -     TSH, 3rd generation; Future    4. Severe obesity (BMI 35.0-39. 9) with comorbidity ([address])    5. BMI 39.0-39.9,adult    6. Libido, decreased  -     Testosterone, free, total; Future  -     TSH, 3rd generation; Future  -     tadalafil (CIALIS) 20 MG tablet; Take 1 tablet (20 mg total) by mouth daily as needed for erectile dysfunction    7. Acute bronchitis, unspecified organism  -     azithromycin (ZITHROMAX) 250 mg tablet; 2 tabs on day 1, 1 tab a day for 4 days after  -     methylPREDNISolone 4 MG tablet therapy pack; Use as directed on package            Patient Instructions     Obesity   AMBULATORY CARE:   Obesity  means your body mass index (BMI) is greater than 30. Your healthcare provider will use your age, height, and weight to measure your BMI. The risks of obesity include  many health problems, including injuries or physical disability.   Diabetes (high blood sugar level)    High blood pressure or high cholesterol    Heart disease or heart failure    Stroke    Gallbladder or liver disease    Cancer of the colon, breast, prostate, liver, or kidney    Sleep apnea    Arthritis or gout    Screening  is done to check for health conditions before you have signs or symptoms. If you are 28to 79years old, your blood sugar level may be checked every 3 years for signs of prediabetes or diabetes. Your healthcare provider will check your blood pressure at each visit. High blood pressure can lead to a stroke or other problems. Your provider may check for signs of heart disease, cancer, or other health problems. Seek care immediately if:   You have a severe headache, confusion, or difficulty speaking. You have weakness on one side of your body. You have chest pain, sweating, or shortness of breath. Call your doctor if:   You have symptoms of gallbladder or liver disease, such as pain in your upper abdomen. You have knee or hip pain and discomfort while walking. You have symptoms of diabetes, such as intense hunger and thirst, and frequent urination. You have symptoms of sleep apnea, such as snoring or daytime sleepiness. You have questions or concerns about your condition or care. Treatment for obesity  focuses on helping you lose weight to improve your health. Even a small decrease in BMI can reduce the risk for many health problems. Your healthcare provider will help you set a weight-loss goal.  Lifestyle changes  are the first step in treating obesity. These include making healthy food choices and getting regular physical activity. Your healthcare provider may suggest a weight-loss program that involves coaching, education, and therapy. Medicine  may help you lose weight when it is used with a healthy foods and physical activity. Surgery  can help you lose weight if you have obesity along with other health problems. Several types of weight-loss surgery are available. Ask your healthcare provider for more information. Tips for safe weight loss:   Set small, realistic goals. An example of a small goal is to walk for 20 minutes 5 days a week. Anther goal is to lose 5% of your body weight. Ask for support.   Tell friends, family members, and coworkers about your goals. Ask someone to lose weight with you. You may also want to join a weight-loss support group. Identify foods or triggers that may cause you to overeat. Remove tempting high-calorie foods from your home and workplace. Place a bowl of fresh fruit on your kitchen counter. If stress causes you to eat, find other ways to cope with stress. A counselor or therapist may be able to help you. Track your daily calories and activity. Write down what you eat and drink. Also write down how many minutes of physical activity you do each day. Track your weekly weight. Weigh yourself in the morning, before you eat or drink anything but after you use the bathroom. Use the same scale, in the same place, and in similar clothing each time. Only weigh yourself 1 to 2 times each week, or as directed. You may become discouraged if you weigh yourself every day. Eating changes: You will need to eat 500 to 1,000 fewer calories each day than you currently eat to lose 1 to 2 pounds a week. The following changes will help you cut calories:  Eat smaller portions. Use small plates, no larger than 9 inches in diameter. Fill your plate half full of fruits and vegetables. Measure your food using measuring cups until you know what a serving size looks like. Eat 3 meals and 1 or 2 snacks each day. Plan your meals in advance. Bea Echeverria and eat at home most of the time. Eat slowly. Do not skip meals. Skipping meals can lead to overeating later in the day. This can make it harder for you to lose weight. Talk with a dietitian to help you make a meal plan and schedule that is right for you. Eat fruits and vegetables at every meal.  They are low in calories and high in fiber, which makes you feel full. Do not add butter, margarine, or cream sauce to vegetables. Use herbs to season steamed vegetables. Eat less fat and fewer fried foods. Eat more baked or grilled chicken and fish.  These protein sources are lower in calories and fat than red meat. Limit fast food. Dress your salads with olive oil and vinegar instead of bottled dressing. Limit the amount of sugar you eat. Do not drink sugary beverages. Limit alcohol. Activity changes:  Physical activity is good for your body in many ways. It helps you burn calories and build strong muscles. It decreases stress and depression, and improves your mood. It can also help you sleep better. Talk to your healthcare provider before you begin an exercise program.  Exercise for at least 30 minutes 5 days a week. Start slowly. Set aside time each day for physical activity that you enjoy and that is convenient for you. It is best to do both weight training and an activity that increases your heart rate, such as walking, bicycling, or swimming. Find ways to be more active. Do yard work and housecleaning. Walk up the stairs instead of using elevators. Spend your leisure time going to events that require walking, such as outdoor festivals or fairs. This extra physical activity can help you lose weight and keep it off. Follow up with your doctor as directed: You may need to meet with a dietitian. Write down your questions so you remember to ask them during your visits. © Copyright Nadege Chavez 2023 Information is for End User's use only and may not be sold, redistributed or otherwise used for commercial purposes. The above information is an  only. It is not intended as medical advice for individual conditions or treatments. Talk to your doctor, nurse or pharmacist before following any medical regimen to see if it is safe and effective for you. Return in about 6 months (around 3/29/2024) for Recheck. Subjective:      Patient ID: Terry Emanuel is a 51 y.o. male.     Chief Complaint   Patient presents with   • Follow-up     Discuss blood work, bronchitis, and a few other things lw cma       Pt is here to review his labs  Pt bp is high today - states he has been going through a lot for the last few month, states he has been drinking and eating to compensate  States that has stopped yesterday    Pt states while he is here he states got sick few weeks ago and he is congested and states it settled in his chest    Pt states he is having issues with libido. Pt states the psych office called him states they are backed up for 4 months and they will call him      The following portions of the patient's history were reviewed and updated as appropriate: allergies, current medications, past family history, past medical history, past social history, past surgical history and problem list.    Review of Systems   HENT: Positive for congestion. Respiratory: Positive for cough. Current Outpatient Medications   Medication Sig Dispense Refill   • ADDERALL XR, 10MG, 10 MG 24 hr capsule Take 1 capsule (10 mg total) by mouth every morning Max Daily Amount: 10 mg 30 capsule 0   • azithromycin (ZITHROMAX) 250 mg tablet 2 tabs on day 1, 1 tab a day for 4 days after 6 tablet 0   • methylPREDNISolone 4 MG tablet therapy pack Use as directed on package 21 each 0   • tadalafil (CIALIS) 20 MG tablet Take 1 tablet (20 mg total) by mouth daily as needed for erectile dysfunction 10 tablet 5   • valsartan (DIOVAN) 80 mg tablet Take 80 mg by mouth every morning       No current facility-administered medications for this visit. Objective:    /86   Pulse 78   Temp (!) 97.3 °F (36.3 °C) (Tympanic)   Resp 18   Ht 5' 11" (1.803 m)   Wt 128 kg (283 lb)   SpO2 96%   BMI 39.47 kg/m²        Physical Exam  Pulmonary:      Breath sounds: Wheezing present.                Recent Results (from the past 672 hour(s))   Marianna Butler Default    Collection Time: 09/27/23  7:07 AM   Result Value Ref Range    White Blood Cell Count 5.6 3.4 - 10.8 x10E3/uL    Red Blood Cell Count 5.14 4.14 - 5.80 x10E6/uL    Hemoglobin 16.1 13.0 - 17.7 g/dL    HCT 46.5 37.5 - 51.0 %    MCV 91 79 - 97 fL    MCH 31.3 26.6 - 33.0 pg    MCHC 34.6 31.5 - 35.7 g/dL    RDW 12.2 11.6 - 15.4 %    Platelet Count 500 049 - 450 x10E3/uL    Neutrophils 47 Not Estab. %    Lymphocytes 35 Not Estab. %    Monocytes 13 Not Estab. %    Eosinophils 4 Not Estab. %    Basophils PCT 1 Not Estab. %    Neutrophils (Absolute) 2.6 1.4 - 7.0 x10E3/uL    Lymphocytes (Absolute) 2.0 0.7 - 3.1 x10E3/uL    Monocytes (Absolute) 0.7 0.1 - 0.9 x10E3/uL    Eosinophils (Absolute) 0.2 0.0 - 0.4 x10E3/uL    Basophils ABS 0.1 0.0 - 0.2 x10E3/uL    Immature Granulocytes 0 Not Estab. %    Immature Granulocytes (Absolute) 0.0 0.0 - 0.1 x10E3/uL   Comprehensive metabolic panel    Collection Time: 09/27/23  7:07 AM   Result Value Ref Range    Glucose, Random 132 (H) 70 - 99 mg/dL    BUN 13 6 - 24 mg/dL    Creatinine 1.08 0.76 - 1.27 mg/dL    eGFR 84 >59 mL/min/1.73    SL AMB BUN/CREATININE RATIO 12 9 - 20    Sodium 137 134 - 144 mmol/L    Potassium 4.4 3.5 - 5.2 mmol/L    Chloride 99 96 - 106 mmol/L    CO2 23 20 - 29 mmol/L    CALCIUM 9.6 8.7 - 10.2 mg/dL    Protein, Total 7.4 6.0 - 8.5 g/dL    Albumin 4.9 4.1 - 5.1 g/dL    Globulin, Total 2.5 1.5 - 4.5 g/dL    Albumin/Globulin Ratio 2.0 1.2 - 2.2    TOTAL BILIRUBIN 0.7 0.0 - 1.2 mg/dL    Alk Phos Isoenzymes 51 44 - 121 IU/L    AST 28 0 - 40 IU/L    ALT 45 (H) 0 - 44 IU/L   Lipid panel    Collection Time: 09/27/23  7:07 AM   Result Value Ref Range    Cholesterol, Total 228 (H) 100 - 199 mg/dL    Triglycerides 129 0 - 149 mg/dL    HDL 38 (L) >39 mg/dL    LDL Calculated 166 (H) 0 - 99 mg/dL   PSA Total, Diagnostic    Collection Time: 09/27/23  7:07 AM   Result Value Ref Range    Prostate Specific Antigen Total 0.5 0.0 - 4.0 ng/mL   Hepatitis C antibody    Collection Time: 09/27/23  7:07 AM   Result Value Ref Range    HEP C AB Non Reactive Non Reactive   Cardiovascular Report    Collection Time: 09/27/23  7:07 AM   Result Value Ref Range    Interpretation Note          Carrington Ray, DO  BMI Counseling: Body mass index is 39.47 kg/m². The BMI is above normal. Nutrition recommendations include reducing portion sizes.

## 2023-10-04 NOTE — TELEPHONE ENCOUNTER
Spoke with Raphael Benitez, he is going tomorrow morning for his A1c and he will call the office to schedule appointment, DARIN Henderson/MARIAN

## 2023-10-06 LAB
ALBUMIN SERPL-MCNC: 4.9 G/DL (ref 4.1–5.1)
ALBUMIN/GLOB SERPL: 2.1 {RATIO} (ref 1.2–2.2)
ALP SERPL-CCNC: 45 IU/L (ref 44–121)
ALT SERPL-CCNC: 41 IU/L (ref 0–44)
AST SERPL-CCNC: 24 IU/L (ref 0–40)
BILIRUB SERPL-MCNC: 0.4 MG/DL (ref 0–1.2)
BUN SERPL-MCNC: 19 MG/DL (ref 6–24)
BUN/CREAT SERPL: 18 (ref 9–20)
CALCIUM SERPL-MCNC: 9.6 MG/DL (ref 8.7–10.2)
CHLORIDE SERPL-SCNC: 101 MMOL/L (ref 96–106)
CHOLEST SERPL-MCNC: 199 MG/DL (ref 100–199)
CO2 SERPL-SCNC: 24 MMOL/L (ref 20–29)
CREAT SERPL-MCNC: 1.03 MG/DL (ref 0.76–1.27)
EGFR: 89 ML/MIN/1.73
GLOBULIN SER-MCNC: 2.3 G/DL (ref 1.5–4.5)
GLUCOSE SERPL-MCNC: 105 MG/DL (ref 70–99)
HDLC SERPL-MCNC: 36 MG/DL
LDLC SERPL CALC-MCNC: 134 MG/DL (ref 0–99)
MICRODELETION SYND BLD/T FISH: NORMAL
POTASSIUM SERPL-SCNC: 4.7 MMOL/L (ref 3.5–5.2)
PROT SERPL-MCNC: 7.2 G/DL (ref 6–8.5)
SODIUM SERPL-SCNC: 139 MMOL/L (ref 134–144)
TESTOST FREE SERPL-MCNC: 7.7 PG/ML (ref 6.8–21.5)
TESTOST SERPL-MCNC: 236 NG/DL (ref 264–916)
TRIGL SERPL-MCNC: 161 MG/DL (ref 0–149)
TSH SERPL DL<=0.005 MIU/L-ACNC: 1.52 UIU/ML (ref 0.45–4.5)

## 2023-10-07 NOTE — RESULT ENCOUNTER NOTE
I think these labs are the 6 month labs ordered after our appt a week or so ago.   They are essentially similar although slightly better to the previous labs from two weeks prior or so  Additional labs is a Testosterone which is low

## 2023-10-27 ENCOUNTER — OFFICE VISIT (OUTPATIENT)
Dept: FAMILY MEDICINE CLINIC | Facility: CLINIC | Age: 48
End: 2023-10-27
Payer: COMMERCIAL

## 2023-10-27 VITALS
BODY MASS INDEX: 39.39 KG/M2 | RESPIRATION RATE: 18 BRPM | WEIGHT: 282.4 LBS | HEART RATE: 82 BPM | DIASTOLIC BLOOD PRESSURE: 78 MMHG | TEMPERATURE: 98 F | SYSTOLIC BLOOD PRESSURE: 136 MMHG

## 2023-10-27 DIAGNOSIS — R79.89 LOW TESTOSTERONE IN MALE: ICD-10-CM

## 2023-10-27 DIAGNOSIS — F90.0 ADHD, PREDOMINANTLY INATTENTIVE TYPE: ICD-10-CM

## 2023-10-27 DIAGNOSIS — I10 BENIGN ESSENTIAL HYPERTENSION: Primary | ICD-10-CM

## 2023-10-27 DIAGNOSIS — E78.2 MIXED HYPERLIPIDEMIA: ICD-10-CM

## 2023-10-27 DIAGNOSIS — R73.09 ABNORMAL GLUCOSE: ICD-10-CM

## 2023-10-27 PROCEDURE — 99214 OFFICE O/P EST MOD 30 MIN: CPT | Performed by: FAMILY MEDICINE

## 2023-10-27 RX ORDER — DEXTROAMPHETAMINE/AMPHETAMINE 10 MG
10 CAPSULE, EXT RELEASE 24 HR ORAL EVERY MORNING
Qty: 30 CAPSULE | Refills: 0 | Status: SHIPPED | OUTPATIENT
Start: 2023-10-27

## 2023-10-30 ENCOUNTER — TELEPHONE (OUTPATIENT)
Dept: ENDOCRINOLOGY | Facility: CLINIC | Age: 48
End: 2023-10-30

## 2023-11-06 ENCOUNTER — CONSULT (OUTPATIENT)
Dept: ENDOCRINOLOGY | Facility: CLINIC | Age: 48
End: 2023-11-06
Payer: COMMERCIAL

## 2023-11-06 VITALS
RESPIRATION RATE: 18 BRPM | DIASTOLIC BLOOD PRESSURE: 70 MMHG | HEART RATE: 91 BPM | BODY MASS INDEX: 40.18 KG/M2 | WEIGHT: 287 LBS | HEIGHT: 71 IN | SYSTOLIC BLOOD PRESSURE: 140 MMHG | OXYGEN SATURATION: 97 % | TEMPERATURE: 97.8 F

## 2023-11-06 DIAGNOSIS — R79.89 LOW TESTOSTERONE IN MALE: Primary | ICD-10-CM

## 2023-11-06 DIAGNOSIS — E66.01 CLASS 3 SEVERE OBESITY DUE TO EXCESS CALORIES WITHOUT SERIOUS COMORBIDITY WITH BODY MASS INDEX (BMI) OF 40.0 TO 44.9 IN ADULT (HCC): ICD-10-CM

## 2023-11-06 PROCEDURE — 99204 OFFICE O/P NEW MOD 45 MIN: CPT | Performed by: STUDENT IN AN ORGANIZED HEALTH CARE EDUCATION/TRAINING PROGRAM

## 2023-11-06 NOTE — PROGRESS NOTES
Marguerite Hou is a 51 y.o. male who is here for new patient visit for evaluation/ management of hypogonadism        He reports feeling sluggish, lack of energy, low sex drive, occasional erectile dysfunction for which testosterone was checked and showed total testosterone of 236 ng/dl and free testosterone of 7.7 pg/ml     Latest Reference Range & Units 10/05/23 07:03   Testosterone, Total, LC/ - 916 ng/dL 236 (L)   TESTOSTERONE FREE 6.8 - 21.5 pg/mL 7.7       Fathered children Yes  Shaving normally Yes     Gonadal ROS:  Fatigue Yes  Change in strength Yes  Loss of axillary/pubic hairNo  Decreased libidoYes  Decreased erections Yes  Headaches No  Vision change No  Breast development No     Modifying factors:  Risk factors for hypogonadism:  Traumatic brain injury No  Testicular trauma No  Radiation therapy No  Chemotherapy No  Obesity {Yes  Diabetes No  HIV/AIDSNo  Steroid useNo  Narcotic use No    He has BETH and he is using CPAP,    Review of Systems   Constitutional:  Positive for fatigue. Negative for appetite change and unexpected weight change. HENT:  Negative for trouble swallowing and voice change. Eyes:  Negative for visual disturbance. Respiratory:  Negative for cough, shortness of breath and wheezing. Cardiovascular:  Negative for palpitations and leg swelling. Gastrointestinal:  Negative for abdominal pain, constipation, diarrhea, nausea and vomiting. Endocrine: Negative for cold intolerance, heat intolerance, polyphagia and polyuria. Musculoskeletal:  Negative for arthralgias. Skin:  Negative for color change, rash and wound. Neurological:  Negative for dizziness, tremors, weakness, light-headedness, numbness and headaches. Psychiatric/Behavioral:  Negative for agitation and sleep disturbance. The patient is not nervous/anxious. All other systems were reviewed and are negative.      Physical Examination:    Vitals:    11/06/23 0901   BP: 140/70   Pulse: 91   Resp: 18 Temp: 97.8 °F (36.6 °C)   SpO2: 97%       General appearance - alert, well appearing, and in no distress  Mental status - normal mood, behavior, speech, dress, motor activity, and thought processes  Eyes - pupils equal, extraocular eye movements intact, sclera anicteric;visual fields intact to confrontation bilaterally  Neck - supple, no significant adenopathy  Chest - clear to auscultation, no wheezes  Heart - normal rate, regular rhythm, normal S1, S2, no murmurs  Abdomen - soft, nontender, nondistended  Neurological -motor and sensory grossly normal bilaterally   Musculoskeletal - no joint tenderness, deformity or swelling  Extremities - peripheral pulses normal, no pedal edema, no clubbing or cyanosis  Skin - normal coloration and turgor, no rashes, no suspicious skin lesions noted   Male - deferred          Latest Reference Range & Units 10/05/23 07:03   Testosterone, Total, LC/ - 916 ng/dL 236 (L)   TESTOSTERONE FREE 6.8 - 21.5 pg/mL 7.7   (L): Data is abnormally low  Allergies, medications, past medical, surgical, family and social history were reviewed and are noted in medical record. Previous records including pertinent laboratory and radiographic results were reviewed and are summarized in the plan below. ASSESSMENT/PLAN:              1. Low testosterone in male    - Ambulatory referral to Endocrinology  - Testosterone, Free/Tot Equilib; Future  - Prolactin Lab Collect; Future  - Luteinizing hormone Lab Collect; Future  - Follicle stimulating hormone; Future  - Sex Hormone Binding Globulin- Lab Collect; Future    Tosin Son was evaluated for low total testosterone of 236 ng/dl and free testosterone of 7.7 pg/ml. He reports non specific symptoms including fatigue and low sex drive otherwise no secondary sexual characteristic deficit. I reviewed differential diagnosis, given normal free testosterone, low sex hormone binding globulin due to obesity should be considered.   I checked free and total testosterone with equilibrium dialysis,  Discussed lab findings with patient. Discussed hypogonadism pathophysiology and differential diagnosis with patient. Further reccommendations include, potential testosterone treatment, pending this evaluation. 2. Class 3 severe obesity due to excess calories without serious comorbidity with body mass index (BMI) of 40.0 to 44.9 in adult [facility])     Emphasized importance of daily exercise, weight loss, caloric reduction, small meals, consumption of multiple servings of fruits and vegetables and avoidance of concentrated sweets such as juice and soda. counseled on adverse side effects of GLP-1 agonist including nausea, vomiting, pancreatitis, medullary thyroid cancer, No FHx of MEN2. He understood these risks and wished to start therapy. Wegovy 0.25 mg weekly started, which will be increased on monthly basis to higher doses if he tolerates. - Semaglutide-Weight Management (WEGOVY) 0.25 MG/0.5ML;  Inject 0.5 mL (0.25 mg total) under the skin once a week  Dispense: 2 mL; Refill: 0

## 2023-11-13 ENCOUNTER — TELEPHONE (OUTPATIENT)
Dept: ENDOCRINOLOGY | Facility: CLINIC | Age: 48
End: 2023-11-13

## 2023-11-13 NOTE — TELEPHONE ENCOUNTER
Prior auth started this morning on covermymeds for wegovy 0.25MG/0.5ML auto-injectors  [place]: [facility]

## 2023-11-15 DIAGNOSIS — E66.01 CLASS 3 SEVERE OBESITY DUE TO EXCESS CALORIES WITH SERIOUS COMORBIDITY AND BODY MASS INDEX (BMI) OF 40.0 TO 44.9 IN ADULT (HCC): Primary | ICD-10-CM

## 2023-11-22 NOTE — TELEPHONE ENCOUNTER
Patient called and stated there is a phone number [phone] for the prior authorization and necessities.  They wont fill it until the doctor calls

## 2023-11-24 ENCOUNTER — TELEPHONE (OUTPATIENT)
Dept: ENDOCRINOLOGY | Facility: CLINIC | Age: 48
End: 2023-11-24

## 2023-11-24 NOTE — TELEPHONE ENCOUNTER
Prior auth started on covermymeds this morning for the [facility]   Key:[id number]

## 2023-11-30 ENCOUNTER — APPOINTMENT (EMERGENCY)
Dept: RADIOLOGY | Facility: HOSPITAL | Age: 48
End: 2023-11-30
Payer: COMMERCIAL

## 2023-11-30 ENCOUNTER — HOSPITAL ENCOUNTER (EMERGENCY)
Facility: HOSPITAL | Age: 48
Discharge: HOME | End: 2023-11-30
Attending: EMERGENCY MEDICINE
Payer: COMMERCIAL

## 2023-11-30 VITALS
SYSTOLIC BLOOD PRESSURE: 139 MMHG | OXYGEN SATURATION: 98 % | RESPIRATION RATE: 16 BRPM | HEART RATE: 80 BPM | DIASTOLIC BLOOD PRESSURE: 74 MMHG

## 2023-11-30 DIAGNOSIS — R07.9 CHEST PAIN, UNSPECIFIED TYPE: Primary | ICD-10-CM

## 2023-11-30 LAB
2HR DELTA HS TROPONIN: 0 NG/L
ALBUMIN SERPL BCP-MCNC: 4.6 G/DL (ref 3.5–5)
ALP SERPL-CCNC: 40 U/L (ref 34–104)
ALT SERPL W P-5'-P-CCNC: 33 U/L (ref 7–52)
ANION GAP SERPL CALCULATED.3IONS-SCNC: 9 MMOL/L
AST SERPL W P-5'-P-CCNC: 21 U/L (ref 13–39)
BASOPHILS # BLD AUTO: 0.03 THOUSANDS/ÂΜL (ref 0–0.1)
BASOPHILS NFR BLD AUTO: 0 % (ref 0–1)
BILIRUB SERPL-MCNC: 0.75 MG/DL (ref 0.2–1)
BUN SERPL-MCNC: 17 MG/DL (ref 5–25)
CALCIUM SERPL-MCNC: 9.5 MG/DL (ref 8.4–10.2)
CARDIAC TROPONIN I PNL SERPL HS: 3 NG/L
CARDIAC TROPONIN I PNL SERPL HS: 3 NG/L
CHLORIDE SERPL-SCNC: 102 MMOL/L (ref 96–108)
CO2 SERPL-SCNC: 25 MMOL/L (ref 21–32)
CREAT SERPL-MCNC: 0.98 MG/DL (ref 0.6–1.3)
EOSINOPHIL # BLD AUTO: 0.09 THOUSAND/ÂΜL (ref 0–0.61)
EOSINOPHIL NFR BLD AUTO: 1 % (ref 0–6)
ERYTHROCYTE [DISTWIDTH] IN BLOOD BY AUTOMATED COUNT: 12.5 % (ref 11.6–15.1)
GFR SERPL CREATININE-BSD FRML MDRD: 90 ML/MIN/1.73SQ M
GLUCOSE SERPL-MCNC: 193 MG/DL (ref 65–140)
HCT VFR BLD AUTO: 46.4 % (ref 36.5–49.3)
HGB BLD-MCNC: 15.6 G/DL (ref 12–17)
IMM GRANULOCYTES # BLD AUTO: 0.03 THOUSAND/UL (ref 0–0.2)
IMM GRANULOCYTES NFR BLD AUTO: 0 % (ref 0–2)
LYMPHOCYTES # BLD AUTO: 2.03 THOUSANDS/ÂΜL (ref 0.6–4.47)
LYMPHOCYTES NFR BLD AUTO: 30 % (ref 14–44)
MCH RBC QN AUTO: 30.6 PG (ref 26.8–34.3)
MCHC RBC AUTO-ENTMCNC: 33.6 G/DL (ref 31.4–37.4)
MCV RBC AUTO: 91 FL (ref 82–98)
MONOCYTES # BLD AUTO: 0.53 THOUSAND/ÂΜL (ref 0.17–1.22)
MONOCYTES NFR BLD AUTO: 8 % (ref 4–12)
NEUTROPHILS # BLD AUTO: 3.99 THOUSANDS/ÂΜL (ref 1.85–7.62)
NEUTS SEG NFR BLD AUTO: 61 % (ref 43–75)
NRBC BLD AUTO-RTO: 0 /100 WBCS
PLATELET # BLD AUTO: 206 THOUSANDS/UL (ref 149–390)
PMV BLD AUTO: 10.1 FL (ref 8.9–12.7)
POTASSIUM SERPL-SCNC: 3.8 MMOL/L (ref 3.5–5.3)
PROT SERPL-MCNC: 7.3 G/DL (ref 6.4–8.4)
RBC # BLD AUTO: 5.09 MILLION/UL (ref 3.88–5.62)
SODIUM SERPL-SCNC: 136 MMOL/L (ref 135–147)
WBC # BLD AUTO: 6.7 THOUSAND/UL (ref 4.31–10.16)

## 2023-11-30 PROCEDURE — 84484 ASSAY OF TROPONIN QUANT: CPT | Performed by: EMERGENCY MEDICINE

## 2023-11-30 PROCEDURE — 71045 X-RAY EXAM CHEST 1 VIEW: CPT

## 2023-11-30 PROCEDURE — 85025 COMPLETE CBC W/AUTO DIFF WBC: CPT | Performed by: EMERGENCY MEDICINE

## 2023-11-30 PROCEDURE — 80053 COMPREHEN METABOLIC PANEL: CPT | Performed by: EMERGENCY MEDICINE

## 2023-11-30 PROCEDURE — 93005 ELECTROCARDIOGRAM TRACING: CPT

## 2023-11-30 PROCEDURE — 99285 EMERGENCY DEPT VISIT HI MDM: CPT | Performed by: PHYSICIAN ASSISTANT

## 2023-11-30 PROCEDURE — 36415 COLL VENOUS BLD VENIPUNCTURE: CPT | Performed by: EMERGENCY MEDICINE

## 2023-11-30 PROCEDURE — 94640 AIRWAY INHALATION TREATMENT: CPT

## 2023-11-30 PROCEDURE — 99285 EMERGENCY DEPT VISIT HI MDM: CPT

## 2023-11-30 RX ORDER — IPRATROPIUM BROMIDE AND ALBUTEROL SULFATE 2.5; .5 MG/3ML; MG/3ML
3 SOLUTION RESPIRATORY (INHALATION) ONCE
Status: COMPLETED | OUTPATIENT
Start: 2023-11-30 | End: 2023-11-30

## 2023-11-30 RX ADMIN — IPRATROPIUM BROMIDE AND ALBUTEROL SULFATE 3 ML: 2.5; .5 SOLUTION RESPIRATORY (INHALATION) at 09:57

## 2023-11-30 NOTE — ED PROVIDER NOTES
History  Chief Complaint   Patient presents with    Chest Pain     Pt reports chest tightness this morning around 0730, has since subsided, denies n/v/d, slight SOB     Patient is a 51 YOM presenting to the ER for evaluation. He states around 7pm he was at work sitting at his desk and he developed centralized chest pain. States it felt like a dull ache. States it was constant. It lasted for about 2 hours then completely resolved. He said he had some associated shortness of breath. He denies any nausea, sweats, abdominal pain, fevers, chills. He denies any radiation of the pain. No ripping/tearing sensation to the back. He states he always has really bad allergies and always has chest congestion and a cough. Patient denies any family history of cardiac issues. History provided by:  Patient   used: No    Chest Pain  Associated symptoms: shortness of breath    Associated symptoms: no abdominal pain, no back pain, no cough, no fever, no palpitations and not vomiting        Prior to Admission Medications   Prescriptions Last Dose Informant Patient Reported? Taking?    ADDERALL XR, 10MG, 10 MG 24 hr capsule   No No   Sig: Take 1 capsule (10 mg total) by mouth every morning Max Daily Amount: 10 mg   tadalafil (CIALIS) 20 MG tablet   No No   Sig: Take 1 tablet (20 mg total) by mouth daily as needed for erectile dysfunction   tirzepatide 2.5 MG/0.5ML   No No   Sig: Inject 0.5 mL (2.5 mg total) under the skin every 7 days   valsartan (DIOVAN) 80 mg tablet   Yes No   Sig: Take 80 mg by mouth every morning      Facility-Administered Medications: None       Past Medical History:   Diagnosis Date    Chronic pain disorder     lumbar     CPAP (continuous positive airway pressure) dependence     GERD (gastroesophageal reflux disease)     diet controlled    Helicobacter pylori gastritis 4/18/2017    Sleep apnea     does not wear CPAP       Past Surgical History:   Procedure Laterality Date    COLONOSCOPY N/A 2017    Procedure: COLONOSCOPY;  Surgeon: Huyen Loera MD;  Location: [address] [place] [address] GI LAB; Service:     ESOPHAGOGASTRODUODENOSCOPY N/A 2017    Procedure: ESOPHAGOGASTRODUODENOSCOPY (EGD); Surgeon: Huyen Loera MD;  Location: [facility] GI LAB; Service:     KNEE ARTHROSCOPY Left     x4 and ACL repair    KNEE ARTHROSCOPY Right     x2 and ACL repair    KNEE ARTHROSCOPY W/ AUTOGENOUS CARTILAGE IMPLANTATION (ACI) PROCEDURE Left     SINUS SURGERY      TONSILLECTOMY      UPPER GASTROINTESTINAL ENDOSCOPY         Family History   Problem Relation Age of Onset    Breast cancer Mother     No Known Problems Father     Cancer Sister         breast    Breast cancer Sister     Liver cancer Maternal Grandmother     Colon cancer Paternal Grandmother     Mental illness Neg Hx     Diabetes Neg Hx      I have reviewed and agree with the history as documented. E-Cigarette/Vaping    E-Cigarette Use Never User      E-Cigarette/Vaping Substances    Nicotine No     THC No     CBD No     Flavoring No     Other No     Unknown No      Social History     Tobacco Use    Smoking status: Former     Packs/day: 0.50     Years: 5.00     Total pack years: 2.50     Types: Cigarettes     Quit date:      Years since quittin.9    Smokeless tobacco: Never   Vaping Use    Vaping Use: Never used   Substance Use Topics    Alcohol use: Yes     Comment: daily    Drug use: No       Review of Systems   Constitutional:  Negative for chills and fever. HENT:  Negative for ear pain and sore throat. Eyes:  Negative for pain and visual disturbance. Respiratory:  Positive for shortness of breath. Negative for cough. Cardiovascular:  Positive for chest pain. Negative for palpitations. Gastrointestinal:  Negative for abdominal pain and vomiting. Genitourinary:  Negative for dysuria and hematuria. Musculoskeletal:  Negative for arthralgias and back pain. Skin:  Negative for color change and rash.    Neurological:  Negative for seizures and syncope. All other systems reviewed and are negative. Physical Exam  Physical Exam  Vitals and nursing note reviewed. Constitutional:       General: He is not in acute distress. Appearance: He is well-developed. HENT:      Head: Normocephalic and atraumatic. Eyes:      Conjunctiva/sclera: Conjunctivae normal.   Cardiovascular:      Rate and Rhythm: Normal rate and regular rhythm. Heart sounds: No murmur heard. Pulmonary:      Effort: Pulmonary effort is normal. No respiratory distress. Breath sounds: Normal breath sounds. Abdominal:      Palpations: Abdomen is soft. Tenderness: There is no abdominal tenderness. Musculoskeletal:         General: No swelling. Normal range of motion. Cervical back: Neck supple. Right lower leg: No tenderness. No edema. Left lower leg: No tenderness. No edema. Skin:     General: Skin is warm and dry. Capillary Refill: Capillary refill takes less than 2 seconds. Neurological:      Mental Status: He is alert.    Psychiatric:         Mood and Affect: Mood normal.         Vital Signs  ED Triage Vitals [11/30/23 0927]   Temp Pulse Respirations Blood Pressure SpO2   -- 94 16 (!) 184/81 97 %      Temp src Heart Rate Source Patient Position - Orthostatic VS BP Location FiO2 (%)   -- Monitor Sitting Right arm --      Pain Score       No Pain           Vitals:    11/30/23 0927 11/30/23 0930 11/30/23 1000   BP: (!) 184/81 164/81 139/74   Pulse: 94 91 80   Patient Position - Orthostatic VS: Sitting           Visual Acuity      ED Medications  Medications   ipratropium-albuterol (DUO-NEB) 0.5-2.5 mg/3 mL inhalation solution 3 mL (3 mL Nebulization Given 11/30/23 0957)       Diagnostic Studies  Results Reviewed       Procedure Component Value Units Date/Time    HS Troponin I 2hr [[id number]]  (Normal) Collected: 11/30/23 1134    Lab Status: Final result Specimen: Blood from Arm, Left Updated: 11/30/23 1203     hs TnI 2hr 3 ng/L      Delta 2hr hsTnI 0 ng/L     Comprehensive metabolic panel [[id number]]  (Abnormal) Collected: 11/30/23 0934    Lab Status: Final result Specimen: Blood from Arm, Left Updated: 11/30/23 1012     Sodium 136 mmol/L      Potassium 3.8 mmol/L      Chloride 102 mmol/L      CO2 25 mmol/L      ANION GAP 9 mmol/L      BUN 17 mg/dL      Creatinine 0.98 mg/dL      Glucose 193 mg/dL      Calcium 9.5 mg/dL      AST 21 U/L      ALT 33 U/L      Alkaline Phosphatase 40 U/L      Total Protein 7.3 g/dL      Albumin 4.6 g/dL      Total Bilirubin 0.75 mg/dL      eGFR 90 ml/min/1.73sq m     Narrative:      National Kidney Disease Foundation guidelines for Chronic Kidney Disease (CKD):     Stage 1 with normal or high GFR (GFR > 90 mL/min/1.73 square meters)    Stage 2 Mild CKD (GFR = 60-89 mL/min/1.73 square meters)    Stage 3A Moderate CKD (GFR = 45-59 mL/min/1.73 square meters)    Stage 3B Moderate CKD (GFR = 30-44 mL/min/1.73 square meters)    Stage 4 Severe CKD (GFR = 15-29 mL/min/1.73 square meters)    Stage 5 End Stage CKD (GFR <15 mL/min/1.73 square meters)  Note: GFR calculation is accurate only with a steady state creatinine    HS Troponin 0hr (reflex protocol) [[id number]]  (Normal) Collected: 11/30/23 0934    Lab Status: Final result Specimen: Blood from Arm, Left Updated: 11/30/23 1011     hs TnI 0hr 3 ng/L     CBC and differential [[id number]] Collected: 11/30/23 0934    Lab Status: Final result Specimen: Blood from Arm, Left Updated: 11/30/23 0946     WBC 6.70 Thousand/uL      RBC 5.09 Million/uL      Hemoglobin 15.6 g/dL      Hematocrit 46.4 %      MCV 91 fL      MCH 30.6 pg      MCHC 33.6 g/dL      RDW 12.5 %      MPV 10.1 fL      Platelets 443 Thousands/uL      nRBC 0 /100 WBCs      Neutrophils Relative 61 %      Immat GRANS % 0 %      Lymphocytes Relative 30 %      Monocytes Relative 8 %      Eosinophils Relative 1 %      Basophils Relative 0 %      Neutrophils Absolute 3.99 Thousands/µL      Immature Grans Absolute 0.03 Thousand/uL Lymphocytes Absolute 2.03 Thousands/µL      Monocytes Absolute 0.53 Thousand/µL      Eosinophils Absolute 0.09 Thousand/µL      Basophils Absolute 0.03 Thousands/µL                    XR chest 1 view portable   ED Interpretation by Moris Harris PA-C (11/30 1129)   Xray within normal limits       Final Result by Tamika Barajas MD (11/30 1435)      No acute cardiopulmonary disease.                Workstation performed: [id number]                    Procedures  ECG 12 Lead Documentation Only    Date/Time: 11/30/2023 11:38 AM    Performed by: Moris Harris PA-C  Authorized by: Moris Harris PA-C    Indications / Diagnosis:  Chest pain  ECG reviewed by me, the ED Provider: yes    Patient location:  ED  Rate:     ECG rate:  84    ECG rate assessment: normal    Rhythm:     Rhythm: sinus rhythm    Ectopy:     Ectopy: none    QRS:     QRS axis:  Normal  Conduction:     Conduction: normal    ST segments:     ST segments:  Normal  T waves:     T waves: normal             ED Course  ED Course as of 11/30/23 1453   Thu Nov 30, 2023   1014 Glucose, Random(!): 193             HEART Risk Score      Flowsheet Row Most Recent Value   Heart Score Risk Calculator    History 0 Filed at: 11/30/2023 1134   ECG 0 Filed at: 11/30/2023 1134   Age 1 Filed at: 11/30/2023 1134   Risk Factors 1 Filed at: 11/30/2023 1134   Troponin 0 Filed at: 11/30/2023 1134   HEART Score 2 Filed at: 11/30/2023 1134                  [address] for PE      Flowsheet Row Most Recent Value   PERC Rule for PE    Age >=50 0 Filed at: 11/30/2023 1134   HR >=100 0 Filed at: 11/30/2023 1134   O2 Sat on room air < 95% 0 Filed at: 11/30/2023 1134   History of PE or DVT 0 Filed at: 11/30/2023 1134   Recent trauma or surgery 0 Filed at: 11/30/2023 1134   Hemoptysis 0 Filed at: 11/30/2023 1134   Exogenous estrogen 0 Filed at: 11/30/2023 1134   Unilateral leg swelling 0 Filed at: 11/30/2023 1134   [address] for PE Results 0 Filed at: 11/30/2023 1134                    Wells' Criteria for PE      Flowsheet Row Most Recent Value   Wells' Criteria for PE    Clinical signs and symptoms of DVT 0 Filed at: 11/30/2023 1134   PE is primary diagnosis or equally likely 0 Filed at: 11/30/2023 1134   HR >100 0 Filed at: 11/30/2023 1134   Immobilization at least 3 days or Surgery in the previous 4 weeks 0 Filed at: 11/30/2023 1134   Previous, objectively diagnosed PE or DVT 0 Filed at: 11/30/2023 1134   Hemoptysis 0 Filed at: 11/30/2023 1134   Malignancy with treatment within 6 months or palliative 0 Filed at: 11/30/2023 1134   Wells' Criteria Total 0 Filed at: 11/30/2023 1134                  Medical Decision Making  Patient with 2 hours of chest that that completely resolved. Cardiac workup is reassuring with a normal EKG, CXR, and 2 negative trops. I did consider PE in this patient but his PERC score is 0 and he has normal vital signs so unlikely a PE. He ambulated in the ER without any chest pain or shortness of breath. No radiation to the back. No tearing pain going to the back. Normal bilat UE pulses. No pleuritic pain. No history of PE. No new unilateral leg swelling. Non exertional. No sweats. No right sided pain. No vomiting. No fever or cough to suggest pneumonia. No  vomiting or subcue crepitus. Equal breath sounds. No skin rash. No syncope. No new murmur to suggest symptomatic valvular stenosis/ regurg or ruptured chordae. Vitals do not suggest tamponade. No palpable crepitus on exam.   No recent viral syndromes to suggest Tete/Myocarditis. Amount and/or Complexity of Data Reviewed  Labs: ordered. Decision-making details documented in ED Course. Radiology: ordered and independent interpretation performed. Risk  Prescription drug management.              Disposition  Final diagnoses:   Chest pain, unspecified type     Time reflects when diagnosis was documented in both MDM as applicable and the Disposition within this note       Time User Action Codes Description Comment    11/30/2023 12:48 PM Mervat Adame [R07.9] Chest pain, unspecified type           ED Disposition       ED Disposition   Discharge    Condition   Stable    Date/Time   Thu Nov 30, 2023 [facility] discharge to home/self care. Follow-up Information       Follow up With Specialties Details Why Contact Info Additional Information    Arthur Saravia DO Family Medicine, Wound Care Schedule an appointment as soon as possible for a visit   [address] [facility] [address] Emergency Department Emergency Medicine  If symptoms worsen [address] [facility] Emergency Department, [facility]), [address], [address]            Discharge Medication List as of 11/30/2023 12:49 PM        CONTINUE these medications which have NOT CHANGED    Details   ADDERALL XR, 10MG, 10 MG 24 hr capsule Take 1 capsule (10 mg total) by mouth every morning Max Daily Amount: 10 mg, Starting Fri 10/27/2023, Normal      tadalafil (CIALIS) 20 MG tablet Take 1 tablet (20 mg total) by mouth daily as needed for erectile dysfunction, Starting Fri 9/29/2023, Normal      tirzepatide 2.5 MG/0.5ML Inject 0.5 mL (2.5 mg total) under the skin every 7 days, Starting Wed 11/15/2023, Until Fri 12/15/2023, Normal      valsartan (DIOVAN) 80 mg tablet Take 80 mg by mouth every morning, Starting Thu 6/8/2023, Historical Med             No discharge procedures on file.     PDMP Review       None            ED Provider  Electronically Signed by             Chip Trinh PA-C  11/30/23 0841

## 2023-11-30 NOTE — ED NOTES
Pt. Requested a snack. Per PRABHU Gamino pt. Ok to have snack. Ambulated pt. To snack area. Pt. Denied sob or chest pain while ambulating. Pt. Returned to room and placed back on cardiac monitor.       Channing Sims RN  11/30/23 [id number]

## 2023-12-03 LAB
ATRIAL RATE: 84 BPM
P AXIS: 74 DEGREES
PR INTERVAL: 154 MS
QRS AXIS: 19 DEGREES
QRSD INTERVAL: 88 MS
QT INTERVAL: 356 MS
QTC INTERVAL: 420 MS
T WAVE AXIS: 37 DEGREES
VENTRICULAR RATE: 84 BPM

## 2023-12-15 DIAGNOSIS — F90.0 ADHD, PREDOMINANTLY INATTENTIVE TYPE: ICD-10-CM

## 2023-12-15 RX ORDER — DEXTROAMPHETAMINE/AMPHETAMINE 10 MG
10 CAPSULE, EXT RELEASE 24 HR ORAL EVERY MORNING
Qty: 30 CAPSULE | Refills: 0 | Status: SHIPPED | OUTPATIENT
Start: 2023-12-15

## 2023-12-15 NOTE — TELEPHONE ENCOUNTER
Reason for call: patient called in for refill   [x] Refill   [] Prior Auth  [] Other:     Office: [facility]  [x] PCP/Provider -  Roman Dorman  [] Specialty/Provider -   ADDERALL XR, 10MG, 10 MG 24 hr capsule     Dose: 10 mg Route: Oral Frequency: Every morning              Sig: Take 1 capsule (10 mg total) by mouth every morning Max Daily Amount: 10 mg       Pharmacy: [facility] in [place] [address] [facility] verified on phone call    Does the patient have enough for 3 days?    [] Yes   [x] No - Send as HP to POD

## 2024-01-10 ENCOUNTER — APPOINTMENT (OUTPATIENT)
Dept: RADIOLOGY | Facility: CLINIC | Age: 49
End: 2024-01-10
Payer: COMMERCIAL

## 2024-01-10 ENCOUNTER — OFFICE VISIT (OUTPATIENT)
Dept: OBGYN CLINIC | Facility: CLINIC | Age: 49
End: 2024-01-10
Payer: COMMERCIAL

## 2024-01-10 VITALS
HEIGHT: 71 IN | SYSTOLIC BLOOD PRESSURE: 152 MMHG | DIASTOLIC BLOOD PRESSURE: 84 MMHG | BODY MASS INDEX: 40.18 KG/M2 | WEIGHT: 287 LBS | HEART RATE: 83 BPM

## 2024-01-10 DIAGNOSIS — M75.21 BICEPS TENDINITIS OF RIGHT SHOULDER: Primary | ICD-10-CM

## 2024-01-10 DIAGNOSIS — G89.29 CHRONIC RIGHT SHOULDER PAIN: ICD-10-CM

## 2024-01-10 DIAGNOSIS — M25.511 RIGHT SHOULDER PAIN, UNSPECIFIED CHRONICITY: ICD-10-CM

## 2024-01-10 DIAGNOSIS — M25.511 CHRONIC RIGHT SHOULDER PAIN: ICD-10-CM

## 2024-01-10 PROCEDURE — 73030 X-RAY EXAM OF SHOULDER: CPT

## 2024-01-10 PROCEDURE — 99203 OFFICE O/P NEW LOW 30 MIN: CPT | Performed by: ORTHOPAEDIC SURGERY

## 2024-01-10 RX ORDER — MELOXICAM 15 MG/1
15 TABLET ORAL DAILY
Qty: 30 TABLET | Refills: 0 | Status: SHIPPED | OUTPATIENT
Start: 2024-01-10 | End: 2024-02-09

## 2024-01-10 NOTE — PROGRESS NOTES
Assessment/Plan:  1. Biceps tendinitis of right shoulder  meloxicam (Mobic) 15 mg tablet      2. Chronic right shoulder pain  XR shoulder 2+ vw right    meloxicam (Mobic) 15 mg tablet        Scribe Attestation    I,:  Eda Mckeon am acting as a scribe while in the presence of the attending physician.:       I,:  Selvin Saleh MD personally performed the services described in this documentation    as scribed in my presence.:             Emil is a 48 year-old male who presents for initial evaluation of the right shoulder. After obtaining a thorough history, orthopedic exam, and reviewing imaging I believe his symptoms are consistent with biceps tendinitis, possible SLAP tear of the right shoulder.  We discussed treatment options including operative and nonoperative treatments today.  At this time he is not exhausted all nonoperative treatment.  I have provided him with a physician-directed home exercise plan we did discuss skilled physical therapy however he would rather start with a home exercise plan. I have provided him with a prescription for Meloxicam 15 mg to take daily for 2 weeks. We will follow-up in 8 weeks for re-evaluation. If he is experiencing worsened or persistent symptoms at that time, we will order an MRI.    Subjective:   Emil Jacobs is a 48 y.o. male who presents for initial evaluation of the right shoulder. The right shoulder has been bothering him for a few months now. He does not recalls an injury to the shoulder. He indicates his pain is located anteriorly over the biceps. He states he is not experiencing pain at rest. He experiences pain when reach out or doing certain movements. He describes his pain as sharp with certain movements. He has no difficulty with his ADLs. He denies taking any over the counter pain medications at this time. He denies any paresthesias of the right upper extremity.      Review of Systems   Constitutional:  Positive for activity change. Negative for chills  and fever.   HENT:  Negative for ear pain and sore throat.    Eyes:  Negative for pain and visual disturbance.   Respiratory:  Negative for cough and shortness of breath.    Cardiovascular:  Negative for chest pain and palpitations.   Gastrointestinal:  Negative for abdominal pain and vomiting.   Genitourinary:  Negative for dysuria and hematuria.   Musculoskeletal:  Positive for arthralgias and myalgias. Negative for back pain.   Skin:  Negative for color change and rash.   Neurological:  Negative for seizures and syncope.   All other systems reviewed and are negative.        Past Medical History:   Diagnosis Date   • Chronic pain disorder     lumbar    • CPAP (continuous positive airway pressure) dependence    • GERD (gastroesophageal reflux disease)     diet controlled   • Helicobacter pylori gastritis 4/18/2017   • Sleep apnea     does not wear CPAP       Past Surgical History:   Procedure Laterality Date   • COLONOSCOPY N/A 4/7/2017    Procedure: COLONOSCOPY;  Surgeon: Lacey Dennis MD;  Location: [facility] GI LAB;  Service:    • ESOPHAGOGASTRODUODENOSCOPY N/A 4/7/2017    Procedure: ESOPHAGOGASTRODUODENOSCOPY (EGD);  Surgeon: Lacey Dennis MD;  Location: [facility] GI LAB;  Service:    • KNEE ARTHROSCOPY Left     x4 and ACL repair   • KNEE ARTHROSCOPY Right     x2 and ACL repair   • KNEE ARTHROSCOPY W/ AUTOGENOUS CARTILAGE IMPLANTATION (ACI) PROCEDURE Left    • SINUS SURGERY     • TONSILLECTOMY     • UPPER GASTROINTESTINAL ENDOSCOPY         Family History   Problem Relation Age of Onset   • Breast cancer Mother    • No Known Problems Father    • Cancer Sister         breast   • Breast cancer Sister    • Liver cancer Maternal Grandmother    • Colon cancer Paternal Grandmother    • Mental illness Neg Hx    • Diabetes Neg Hx        Social History     Occupational History   • Not on file   Tobacco Use   • Smoking status: Former     Current packs/day: 0.00     Average packs/day: 0.5 packs/day for 5.0 years (2.5 ttl  pk-yrs)     Types: Cigarettes     Start date:      Quit date:      Years since quittin.0   • Smokeless tobacco: Never   Vaping Use   • Vaping status: Never Used   Substance and Sexual Activity   • Alcohol use: Yes     Comment: daily   • Drug use: No   • Sexual activity: Not on file         Current Outpatient Medications:   •  ADDERALL XR, 10MG, 10 MG 24 hr capsule, Take 1 capsule (10 mg total) by mouth every morning Max Daily Amount: 10 mg, Disp: 30 capsule, Rfl: 0  •  meloxicam (Mobic) 15 mg tablet, Take 1 tablet (15 mg total) by mouth daily, Disp: 30 tablet, Rfl: 0  •  tadalafil (CIALIS) 20 MG tablet, Take 1 tablet (20 mg total) by mouth daily as needed for erectile dysfunction, Disp: 10 tablet, Rfl: 5  •  valsartan (DIOVAN) 80 mg tablet, Take 80 mg by mouth every morning, Disp: , Rfl:     Allergies   Allergen Reactions   • Tetracycline Rash     Severe itching       Objective:  Vitals:    01/10/24 1706   BP: 152/84   Pulse: 83       Right Shoulder Exam     Tenderness   The patient is experiencing tenderness in the biceps tendon.    Range of Motion   The patient has normal right shoulder ROM.    Muscle Strength   External rotation: 5/5   Supraspinatus: 5/5   Subscapularis: 5/5   Biceps: 5/5     Tests   Sparks test: positive  Cross arm: positive    Other   Erythema: absent  Scars: absent  Sensation: normal  Pulse: present            Physical Exam  Vitals and nursing note reviewed.   Constitutional:       Appearance: Normal appearance.   HENT:      Head: Normocephalic and atraumatic.      Right Ear: External ear normal.      Left Ear: External ear normal.      Nose: Nose normal.   Eyes:      General: No scleral icterus.     Extraocular Movements: Extraocular movements intact.      Conjunctiva/sclera: Conjunctivae normal.   Cardiovascular:      Rate and Rhythm: Normal rate.   Pulmonary:      Effort: Pulmonary effort is normal. No respiratory distress.   Musculoskeletal:      Cervical back: Normal range  of motion and neck supple.      Comments: See ortho exam   Skin:     General: Skin is warm and dry.   Neurological:      Mental Status: He is alert and oriented to person, place, and time.   Psychiatric:         Mood and Affect: Mood normal.         Behavior: Behavior normal.         I have personally reviewed pertinent films in PACS and my interpretation is as follows:  X-rays of the right shoulder obtained in the office today demonstrate acromioclavicular joint arthritis. No acute osseous abnormalities.      This document was created using speech voice recognition software.   Grammatical errors, random word insertions, pronoun errors, and incomplete sentences are an occasional consequence of this system due to software limitations, ambient noise, and hardware issues.   Any formal questions or concerns about content, text, or information contained within the body of this dictation should be directly addressed to the provider for clarification.

## 2024-01-10 NOTE — PATIENT INSTRUCTIONS
EXERCISES FOR SHOULDER REHABILITATION: INCREASED FLEXIBILITY AND STRENGTH     All of the exercises listed are to be done with slow and steady movements and controlled breathing. Do only what you feel comfortable doing.   1. CODMAN’S/ PENDULUM       1. Lean forward and place one hand on a counter or table for support. Let your other arm hang freely at your side.  2. Gently swing your arm forward and back. Repeat the exercise moving your arm side-to-side, and repeat again in a circular motion.  3. Keep from rounding your back or locking your knees.  4. Repeat 10 times.  5. Complete 2-3 sets.  2. CROSSOVER ARM STRETCH       1. Standing upright, relax your shoulders and pull one arm across your body as far as possible. Hold at your upper arm.  2. Hold the stretch for 15-30 seconds.  3. Repeat the stretch for your other arm.  4. Repeat the stretch 3 times for each arm.  3. ACTIVE ASSISTIVE ROM WITH STICK     1. Keep your affected arm relaxed, do not lift your affected arm on its own.  2. Move through the motions slowly.    Flexion:  1. Hold a stick with your hands shoulder width apart.  2. Slowly raise your arms up out in front of you. Relax your affected arm allowing your unaffected arm to lift your affected arm.  3. After holding for 3-5 seconds at the end range, slowly return back down.  4. Repeat 10 times.    Extension:   1. Hold a stick at your side with your affected arm at your side.  2. Slowly push your affected arm backwards behind you. Keep your body upright and your affected arm relaxed.  3. After holding for 3-5 seconds at the end range, slowly return back down.  4. Repeat 10 times.    Abduction:  1. Hold a stick with your hands shoulder width apart.  2. Slowly push your affected arm to the side of you. Completely relax your affected arm.  3. After holding for 3-5 seconds at the end range, slowly return back down.  4. Repeat 10 times.    Internal Rotation/Extension:  1. Hold a stick with your hands as close  as possible behind your body.  2. Slowly raise your affected arm up, bringing your affected arm up with it. Relax your affected arm as much as possible.  3. After holding for 3-5 seconds at the end range, slowly return back down.  4. Repeat 10 times.    Passive Internal Rotation:  1. Hold a stick with your hands shoulder width apart behind your back.  2. Slowly pull your affected arm behind your body. Completely relax your affected arm.  3. After holding for 3-5 seconds at the end range, slowly return back down.  4. Repeat 10 times.     Passive External Rotation:  1. Hold a stick with one hand and cup the other end of the stick with your other hand.  2. Slowly push your affected arm outward horizontally.  3. After holding for 3-5 seconds at the end range, slowly return back down.  4. Repeat 10 times.  4. TOWEL STRETCH INTERNAL ROTATION / EXTENSION       1. Hold a towel behind your back. Affected arm at the bottom.  2. Slowly elevate your affected arm by pulling up with your unaffected arm.  3. Hold for 20-30 seconds at the maximum pain free range, then relax for 30 seconds.  4. Repeat 3-6 times.  5. SLEEPER STRETCH       1. Lay on your side on a firm surface with your affected arm under you as shown. Flex your elbow to 90 degrees.  2. Slowly press down on your forearm with the opposite arm, stopping when you feel a stretch.  3. Hold for 20-30 seconds at the maximum pain free range, then relax for 30 seconds.  4. Repeat 3-6 times.  6. STANDING ROW       1. Attach a band to a doorknob or other steady surface. You may tie the ends of the band together to create a loop.  2. Stand upright with your arm at a 90 degree angle at your side.  3. Keeping your arm tucked at your side, slowly pull your elbow straight backwards.  4. Slowly return to the start position, repeat 8-12 times.  5. Complete 3 sets.  7. EXTERNAL ROTATION WITH ARM ABDUCTED 90°       1. Attach a band to a doorknob or other steady surface. You may tie the  ends of the band together to create a loop.  2. Stand upright with your arm at a 90 degree angle and at shoulder height.  3. Keeping your shoulder and elbow at an even level, slowly raise your hand until it is facing upwards, or even with your head.  4. Slowly return to the start position, repeat 8-12 times.  5. Complete 3 sets.  8. INTERNAL ROTATION WITH BAND       1. Attach a band to a doorknob or other steady surface. You may tie the ends of the band together to create a loop.  2. Stand perpendicular to the band with your arm at a 90 degree angle and tucked at your side.  3. Keeping your elbow tucked, slowly rotate your hand inward.  4. Slowly return to the start position, repeat 8-12 times.  5. Complete 3 sets.  9. EXTERNAL ROTATION WITH BAND       1. Attach a band to a doorknob or other steady surface. You may tie the ends of the band together to create a loop.  2. Stand perpendicular to the band with your arm at a 90 degree angle and tucked at your side.  3. Keeping your elbow tucked, slowly rotate your hand outward.  4. Slowly return to the start position, repeat 8-12 times.  5. Complete 3 sets.  10. ELBOW FLEXION (BICEP CURL)       1. Standing upright hold a dumbbell in each hand.  2. Keeping your elbow close to your side slowly raise the weight upwards toward your shoulder.  3. Avoid swinging your arm or using momentum.  4. Repeat for 8-12 repetitions.  5. Complete 3 sets.  11. ELBOW EXTENSION (OVERHEAD TRICEP EXTENSION)       1. Standing upright hold a dumbbell over your head. Support your arm by holding your opposite hand on your upper arm.  2. Slowly straighten your elbow and raise the weight overhead.  3. Repeat for 8-12 repetions.  4. Complete 3 sets.  12. STRAIGHT ARM DUMBBELL ROW       1. Place your knee or a chair or bench and lean forward so your that your hand supports your weight. Use a light weight (1-7lbs).  2. Slowly raise the weight behind you parallel to the floor, rotating your hand to a  thumbs-up position. Keep your arm straight.  3. Repeat 15-20 times.  4. Complete 3-4 sets.  13. SCAPULA SETTING       1. Lay on your stomach with your arms at your side. Palms facing downwards.  2. Slowly draw your shoulder blades together and down your back.  3. Ease about half-way off this position and hold for 10 seconds, then relax for 10 seconds.  4. Repeat 10 times.  14. SCAPULAR RETRACTION/PROTRACTION       1. Lay on your stomach on an edge with your affected arm hanging off the side.  2. Slowly raise your arm keeping your elbow straight by drawing your shoulder blade to the other side. You are not raising your arm straight out to your side, but elevating your arm.  3. Repeat 10 times.  4. Complete 2 sets.  15. BENT-OVER HORIZONTAL ABDUCTION       1. Lay on your stomach on an edge with your affected arm hanging off the side.  2. Slowly raise your arm keeping your elbow straight by raising your arm out to your side. Control the movement.  3. Repeat 10 times.  4. Complete 2 sets.  16. INTERNAL AND EXTERNAL ROTATION       1. Lay on your back on a steady surface.  2. Raise your arm to 90 degrees and lift your fingers to face upwards.  3. Keeping your arm bend, slowly move your arm as shown.  4. Bring your arm to a smaller angle (45 degrees) if 90 degrees hurts.  5. Repeat 20 times.  6. Complete 3-4 sets.  17. EXTERNAL ROTATION       1. Lay on your side on a steady surface with your unaffected arm cradling your head.  2. Hold your arm at a 90 degree angle, keeping your affected arms elbow tucked at your side.  3. Slowly raise your arm to a vertical position and lower the weight slowly.  4. Repeat 10 times.  5. Complete 2-3 sets.  18. INTERNAL ROTATION       1. Lay on your side on a flat surface on the side of the affected arm  2. Hold your arm at a 90 degree angle, keeping your affected arms elbow tucked at your side.  3. Slowly raise your arm to a vertical position and lower the weight slowly.  4. Repeat 10  times.  5. Complete 2-3 sets.

## 2024-01-11 ENCOUNTER — TELEPHONE (OUTPATIENT)
Dept: PSYCHIATRY | Facility: CLINIC | Age: 49
End: 2024-01-11

## 2024-01-11 NOTE — TELEPHONE ENCOUNTER
"Behavioral Health Outpatient Intake Questions    Referred By   : [facility]    Please advise interviewee that they need to answer all questions truthfully to allow for best care, and any misrepresentations of information may affect their ability to be seen at this clinic   => Was this discussed? Yes     If Minor Child (under age 18)    Who is/are the legal guardian(s) of the child?     Is there a custody agreement?      If \"YES\"- Custody orders must be obtained prior to scheduling the first appointment  In addition, Consent to Treatment must be signed by all legal guardians prior to scheduling the first appointment    If \"NO\"- Consent to Treatment must be signed by all legal guardians prior to scheduling the first appointment    Behavioral Health Outpatient Intake History -     Presenting Problem (in patient's own words): ADHD, can't focus    Are there any communication barriers for this patient?     No                                               If yes, please describe barriers:   If there is a unique situation, please refer to Corbin Duenas/Betty Solis for final determination.    Are you taking any psychiatric medications? Yes     If \"YES\" -What are they Adderall XR     If \"YES\" -Who prescribes? Frank Lombardi    Has the Patient previously received outpatient Talk Therapy or Medication Management from [facility]  No        If \"YES\"- When, Where and with Whom?         If \"NO\" -Has Patient received these services elsewhere?       If \"YES\" -When, Where, and with Whom?    Has the Patient abused alcohol or other substances in the last 6 months ? No  No concerns of substance abuse are reported.     If \"YES\" -What substance, How much, How often?     If illegal substance: Refer to [facility] (for MELANIE) or [facility].   If Alcohol in excess of 10 drinks per week:  Refer to [facility] (for MELANIE) or [facility]    Legal History-     Is this treatment court ordered? No   If \"yes \"send to " ":  Talk Therapy : Send to Corbin Duenas/Betty Solis for final determination   Med Management: Send to Dr Martinez for final determination     Has the Patient been convicted of a felony?  No   If \"Yes\" send to -When, What?  Talk Therapy : Send to Corbin Solis for final determination   Med Management: Send to Dr Martinez for final determination     ACCEPTED as a patient Yes  If \"Yes\" Appointment Date: with Tessa Rojas  Fri, Feb 2, 2024 @ 4pm & Fri, Mar 8 @ 4pm    Referred Elsewhere? No  If “Yes” - (Where? Ex: [facility], [facility], [facility], [facility], etc.)       Name of Insurance Co: [employer]  Insurance ID# [id number]  Insurance Phone #   If ins is primary or secondary? primary  If patient is a minor, parents information such as Name, D.O.B of guarantor.    RTE completed (ha ortho appt 1/10/2024).  "

## 2024-02-01 DIAGNOSIS — F90.0 ADHD, PREDOMINANTLY INATTENTIVE TYPE: ICD-10-CM

## 2024-02-01 RX ORDER — DEXTROAMPHETAMINE/AMPHETAMINE 10 MG
10 CAPSULE, EXT RELEASE 24 HR ORAL EVERY MORNING
Qty: 30 CAPSULE | Refills: 0 | Status: SHIPPED | OUTPATIENT
Start: 2024-02-01 | End: 2024-02-02 | Stop reason: DRUGHIGH

## 2024-02-01 NOTE — PSYCH
"This note was not shared with the patient due to reasonable likelihood of causing patient harm    PSYCHIATRIC EVALUATION     [facility]    Name and Date of Birth:  Emil Jaocbs 48 y.o. 1975    Date of Visit: 02/02/24    Reason for visit:   Chief Complaint   Patient presents with    Establish Care    Medication Management     HPI     Emil Jacobs is a giselle 48 y.o. male with a history of ADHD who presents today to establish care and for medication management. He has never seen a psychiatrist before but was dx with ADHD by his PCP and started on Adderall XR 10 mg qAM. This wasn't working so he increased it to 20 mg and finds this very helpful. He reports he has had sx of ADHD from childhood including impulsivity, hyperactivity, and concentration difficulties. He was a poor student and was considered \"lazy\". 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 He doesn't need it on the weekends. He suffers from mild depressive sx intermittently but  feels that his mood has been \"good overall\" the past couple of weeks. He admits to thoughts of worthlessness related to his marital conflicts. He denies thoughts of hopelessness. He denies anhedonia, anergy, and appetite changes. He denies history of SIB, SI, HI, and suicide attempts. He denies any inpatient hospitalizations for mental health. He has used Xanax in the past for dentist appt but no other psychotropic medications. He does report that he has always been an \"overthinker\" and often worries about things that don't come true. He worries about half the days. He is not sure if he has ever had a panic attack since he had an episode of chest tightness not long ago but didn't feel particularly stressed or worried at the time. He reports his sleep is \"excellent\". He denies history of sherrill,  intrusive/obsessive thoughts, " "eating disorders, AH/VH, and trauma/abuse. He reports he has seen many traumatic things as a  and a  but none that cause flashbacks or nightmares. He lives with his wife, 3 kids (15 y/o, 15 y/o, and 11 y/o) and two dogs (Morteza and Davy). 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 He enjoys hunting, fishing, golf, and working at [employer]. His main stressor now is his marriage (\"when it's good it's good, when it's bad it's bad\"). Walking or working are his main de-stressors. He admits to drinking about 7-10 bourbons per week when out with friends or his wife. He is a former tobacco user. He occasionally uses a cannabis gummy to relax. He denies history or current use of illicit drugs. He does have firearms at home and keeps most of them locked up. His kids are trained in gun safety. His PMH Includes HTN, HLD, and OSAS.     He denies any suicidal ideation, intent or plan at present, denies any homicidal ideation, intent or plan at present.  He denies any auditory hallucinations, denies any visual hallucinations, denies any delusions.  He denies any side effects from current psychiatric medications.  .  HPI ROS Appetite Changes and Sleep: normal sleep, normal appetite, decreased energy    Psychiatric Review Of Systems:    Sleep changes: no  Appetite changes: no  Weight changes: no  Energy/anergy: yes, decreased  Interest/pleasure/anhedonia: no  Somatic symptoms: no  Anxiety/panic: worrying  Maya: no  Guilty/hopeless: no  Self injurious behavior/risky behavior: no  Suicidal ideation: no  Homicidal ideation: no  Auditory hallucinations: no  Visual hallucinations: no  Other hallucinations: no  Delusional thinking: no  Eating disorder history: no  Obsessive/compulsive symptoms: no    Review Of Systems:    Mood Normal   Behavior Normal    Thought Content Normal   General Normal    Personality Normal   Other Psych Symptoms " Normal   Constitutional as noted in HPI   ENT as noted in HPI   Cardiovascular as noted in HPI   Respiratory as noted in HPI   Gastrointestinal as noted in HPI   Genitourinary as noted in HPI   Musculoskeletal as noted in HPI   Integumentary as noted in HPI   Neurological as noted in HPI   Endocrine negative   Other Symptoms none       Past Psychiatric History:     Past Inpatient Psychiatric Treatment:   No history of past inpatient psychiatric admissions  Past Outpatient Psychiatric Treatment:    No history of past outpatient psychiatric treatment  Past Suicide Attempts: no  Past Violent Behavior: no  Past Psychiatric Medication Trials: none    Family Psychiatric History:     Family History   Problem Relation Age of Onset    Breast cancer Mother     No Known Problems Father     Cancer Sister         breast    Breast cancer Sister     Liver cancer Maternal Grandmother     Colon cancer Paternal Grandmother     Mental illness Neg Hx     Diabetes Neg Hx        Social History     Substance and Sexual Activity   Drug Use No     Social History     Socioeconomic History    Marital status: /Civil Union     Spouse name: Not on file    Number of children: Not on file    Years of education: Not on file    Highest education level: Not on file   Occupational History    Not on file   Tobacco Use    Smoking status: Former     Current packs/day: 0.00     Average packs/day: 0.5 packs/day for 5.0 years (2.5 ttl pk-yrs)     Types: Cigarettes     Start date:      Quit date:      Years since quittin.0    Smokeless tobacco: Never   Vaping Use    Vaping status: Never Used   Substance and Sexual Activity    Alcohol use: Yes     Comment: daily    Drug use: No    Sexual activity: Not on file   Other Topics Concern    Not on file   Social History Narrative    Not on file     Social Determinants of Health     Financial Resource Strain: Not on file   Food Insecurity: Not on file   Transportation Needs: Not on file   Physical  Activity: Not on file   Stress: Not on file   Social Connections: Not on file   Intimate Partner Violence: Not on file   Housing Stability: Not on file     Social History     Social History Narrative    Not on file       Traumatic History:     Abuse:  N/A  Other Traumatic Events:  several as a police office/ but none that cause flashbacks or nightmares    History Review:    normal bulk, normal tone    OBJECTIVE:     Mental Status Evaluation:    Appearance age appropriate, casually dressed, dressed appropriately, adequate grooming, looks stated age   Behavior pleasant, cooperative   Speech normal rate, normal volume, normal pitch   Mood euthymic   Affect mood-congruent   Thought Processes organized, logical, coherent   Associations intact associations   Thought Content no overt delusions   Perceptual Disturbances: no auditory hallucinations, no visual hallucinations   Abnormal Thoughts  Risk Potential Suicidal ideation - None  Homicidal ideation - None  Potential for aggression - No   Orientation oriented to person, place, and time/date   Memory recent and remote memory grossly intact   Cosciousness alert and awake   Attention Span attention span and concentration appear shorter than expected for age   Intellect Appears to be of Average Intelligence   Insight age appropriate   Judgement age appropriate   Muscle Strength and  Gait normal muscle strength and normal muscle tone, normal gait and normal balance   Language no difficulty naming common objects   Fund of Knowledge displays adequate knowledge of current events   Pain none   Pain Scale N/A     No abnormal movements noted throughout visit.    Laboratory Results: No results found for this or any previous visit.    Assessment/Plan:     While his history is consistent with ADHD I have asked that he get neuropsych testing for an official diagnosis which he is agreeable to. In the meantime we can continue with the Adderall, though I advise increasing it to 20  mg qAM on workdays and skipping it on the weekends. He is interested in talk therapy as well so I have given him a referral. We have discussed their safety plan and pt agrees that if they experience unsafe thoughts that they will reach out to their supports including this office, the suicide hotline, and emergency services if necessary. Patient is aware of non-emergent and emergent mental health resources. They are able to contract for their own safety at this time.    Will follow up in 1 months. Patient is aware to call the office if questions or concerns arise sooner.       Diagnoses and all orders for this visit:    ADHD, predominantly inattentive type  -     amphetamine-dextroamphetamine (ADDERALL XR, 20MG,) 20 MG 24 hr capsule; Take 1 capsule (20 mg total) by mouth every morning Max Daily Amount: 20 mg  -     Ambulatory Referral to Neuropsychology; Future          Treatment Recommendations/Precautions:    Increase medications:    - Adderall XR to 20 mg qAM    Risks/Benefits      Risks, Benefits And Possible Side Effects Of Medications:    Risks, benefits, and possible side effects of medications explained to patient and patient verbalizes understanding and agreement for treatment.    Controlled Medication Discussion:     Emil has been filling controlled prescriptions on time as prescribed according to [place] Prescription Drug Monitoring Program    The patient understands the risk of treatment with this class of medication. They are aware of the potential for abuse. Patient agrees not to drive or operate heavy machinery if feeling impaired, to take medication as prescribed, to not drink alcohol when taking this medication, and to not share this medication with others.      Controlled Substance Review    [place] PDMP or [place]  reviewed: No red flags were identified; safe to proceed with prescription..     Psychotherapy Provided:     Individual psychotherapy provided: No    Visit Start Time:  4:00 PM  Visit End  Time:  4:50 PM  Total Visit Duration: 50 minutes    Tessa Rojas PA-C

## 2024-02-01 NOTE — TELEPHONE ENCOUNTER
Reason for call: GENERIC IS OK TO FILL  [x] Refill   [] Prior Auth  [] Other:     Office:   [x] PCP/Provider - [facility]  [] Specialty/Provider -     Medication: Adderall XR 10 mg (Generic OK!!!!)    Quantity: 30    Pharmacy:   [id number] [place] [address] [place]  P: [phone]  F: [phone]    Does the patient have enough for 3 days?   [] Yes   [x] No - Send as HP to POD

## 2024-02-02 ENCOUNTER — OFFICE VISIT (OUTPATIENT)
Dept: PSYCHIATRY | Facility: CLINIC | Age: 49
End: 2024-02-02
Payer: COMMERCIAL

## 2024-02-02 DIAGNOSIS — F90.0 ADHD, PREDOMINANTLY INATTENTIVE TYPE: ICD-10-CM

## 2024-02-02 PROCEDURE — 90792 PSYCH DIAG EVAL W/MED SRVCS: CPT | Performed by: PHYSICIAN ASSISTANT

## 2024-02-02 RX ORDER — DEXTROAMPHETAMINE SACCHARATE, AMPHETAMINE ASPARTATE MONOHYDRATE, DEXTROAMPHETAMINE SULFATE AND AMPHETAMINE SULFATE 5; 5; 5; 5 MG/1; MG/1; MG/1; MG/1
20 CAPSULE, EXTENDED RELEASE ORAL EVERY MORNING
Qty: 30 CAPSULE | Refills: 0 | Status: SHIPPED | OUTPATIENT
Start: 2024-02-02

## 2024-02-08 ENCOUNTER — TELEPHONE (OUTPATIENT)
Dept: ENDOCRINOLOGY | Facility: CLINIC | Age: 49
End: 2024-02-08

## 2024-02-08 ENCOUNTER — TELEMEDICINE (OUTPATIENT)
Dept: ENDOCRINOLOGY | Facility: CLINIC | Age: 49
End: 2024-02-08
Payer: COMMERCIAL

## 2024-02-08 VITALS — WEIGHT: 287 LBS | HEIGHT: 71 IN | BODY MASS INDEX: 40.18 KG/M2

## 2024-02-08 DIAGNOSIS — M75.21 BICEPS TENDINITIS OF RIGHT SHOULDER: ICD-10-CM

## 2024-02-08 DIAGNOSIS — G89.29 CHRONIC RIGHT SHOULDER PAIN: ICD-10-CM

## 2024-02-08 DIAGNOSIS — R68.82 DECREASED SEX DRIVE: ICD-10-CM

## 2024-02-08 DIAGNOSIS — M25.511 CHRONIC RIGHT SHOULDER PAIN: ICD-10-CM

## 2024-02-08 DIAGNOSIS — R79.89 LOW TESTOSTERONE IN MALE: Primary | ICD-10-CM

## 2024-02-08 PROCEDURE — 99213 OFFICE O/P EST LOW 20 MIN: CPT | Performed by: STUDENT IN AN ORGANIZED HEALTH CARE EDUCATION/TRAINING PROGRAM

## 2024-02-08 RX ORDER — MELOXICAM 15 MG/1
15 TABLET ORAL DAILY
Qty: 30 TABLET | Refills: 0 | Status: SHIPPED | OUTPATIENT
Start: 2024-02-08 | End: 2024-03-09

## 2024-02-08 NOTE — ASSESSMENT & PLAN NOTE
low total testosterone of 236 ng/dl and free testosterone of 7.7 pg/ml.   Non specific symptoms including fatigue, lack of energy, and low sex drive, with intact secondary sexual characteristic.  In his last visit was ordered testosterone free and total with equilibrium dialysis, sex hormone binding globulin, FSH, LH and prolactin which has not been done.  He was advised to do blood work to determine further evaluation.

## 2024-02-08 NOTE — PROGRESS NOTES
Virtual Regular Visit    Verification of patient location:    Patient is located at Home in the following state in which I hold an active license [place]      Assessment/Plan:    Problem List Items Addressed This Visit          Other    Low testosterone in male - Primary     low total testosterone of 236 ng/dl and free testosterone of 7.7 pg/ml.   Non specific symptoms including fatigue, lack of energy, and low sex drive, with intact secondary sexual characteristic.  In his last visit was ordered testosterone free and total with equilibrium dialysis, sex hormone binding globulin, FSH, LH and prolactin which has not been done.  He was advised to do blood work to determine further evaluation.           Decreased sex drive     See plan for low testosterone.                 Reason for visit is   Chief Complaint   Patient presents with    Virtual Regular Visit    Virtual Brief Visit    Virtual Regular Visit          Encounter provider Apple France MD    Provider located at [facility] [address] [place] [address]  [phone]      Recent Visits  No visits were found meeting these conditions.  Showing recent visits within past 7 days and meeting all other requirements  Today's Visits  Date Type Provider Dept   02/08/24 Telemedicine Apple France MD [facility] [place]   Showing today's visits and meeting all other requirements  Future Appointments  No visits were found meeting these conditions.  Showing future appointments within next 150 days and meeting all other requirements       The patient was identified by name and date of birth. Emil Jacobs was informed that this is a telemedicine visit and that the visit is being conducted through the Epic Embedded platform. He agrees to proceed..  My office door was closed. No one else was in the room.  He acknowledged consent and understanding of privacy  and security of the video platform. The patient has agreed to participate and understands they can discontinue the visit at any time.    Patient is aware this is a billable service.     Subjective  Emil Jacobs is a 48 y.o. male with low testosterone  .      HPI   Emil Jacobs is a 48 year old male who is seen virtually for low testosterone.  Emil was seen in November 2023 for feeling sluggish, lack of energy, low sex drive, occasional erectile dysfunction   Testosterone checked and showed low total testosterone of 236 ng/dl and normal free Testosterone of 7.7 pg/ml.    Past Medical History:   Diagnosis Date    Chronic pain disorder     lumbar     CPAP (continuous positive airway pressure) dependence     GERD (gastroesophageal reflux disease)     diet controlled    Helicobacter pylori gastritis 4/18/2017    Sleep apnea     does not wear CPAP       Past Surgical History:   Procedure Laterality Date    COLONOSCOPY N/A 4/7/2017    Procedure: COLONOSCOPY;  Surgeon: Lacey Dennis MD;  Location: [facility];  Service:     ESOPHAGOGASTRODUODENOSCOPY N/A 4/7/2017    Procedure: ESOPHAGOGASTRODUODENOSCOPY (EGD);  Surgeon: Lacey Dennis MD;  Location: [facility];  Service:     KNEE ARTHROSCOPY Left     x4 and ACL repair    KNEE ARTHROSCOPY Right     x2 and ACL repair    KNEE ARTHROSCOPY W/ AUTOGENOUS CARTILAGE IMPLANTATION (ACI) PROCEDURE Left     SINUS SURGERY      TONSILLECTOMY      UPPER GASTROINTESTINAL ENDOSCOPY         Current Outpatient Medications   Medication Sig Dispense Refill    amphetamine-dextroamphetamine (ADDERALL XR, 20MG,) 20 MG 24 hr capsule Take 1 capsule (20 mg total) by mouth every morning Max Daily Amount: 20 mg 30 capsule 0    meloxicam (Mobic) 15 mg tablet Take 1 tablet (15 mg total) by mouth daily 30 tablet 0    tadalafil (CIALIS) 20 MG tablet Take 1 tablet (20 mg total) by mouth daily as needed for erectile dysfunction 10 tablet 5    valsartan (DIOVAN) 80 mg tablet Take 80 mg by mouth every  "morning       No current facility-administered medications for this visit.        Allergies   Allergen Reactions    Tetracycline Rash     Severe itching       Review of Systems   Constitutional:  Positive for fatigue. Negative for appetite change and unexpected weight change.   HENT:  Negative for trouble swallowing and voice change.    Eyes:  Negative for visual disturbance.   Respiratory:  Negative for cough, shortness of breath and wheezing.    Cardiovascular:  Negative for palpitations and leg swelling.   Gastrointestinal:  Negative for abdominal pain, constipation, diarrhea, nausea and vomiting.   Endocrine: Negative for cold intolerance, heat intolerance, polyphagia and polyuria.   Musculoskeletal:  Negative for arthralgias.   Skin:  Negative for color change, rash and wound.   Neurological:  Negative for dizziness, tremors, weakness, light-headedness, numbness and headaches.   Psychiatric/Behavioral:  Negative for agitation and sleep disturbance. The patient is not nervous/anxious.        Video Exam    Vitals:    02/08/24 0814   Weight: 130 kg (287 lb)   Height: 5' 11\" (1.803 m)       Physical Exam  Constitutional:       General: He is not in acute distress.     Appearance: Normal appearance. He is not ill-appearing, toxic-appearing or diaphoretic.   HENT:      Head: Normocephalic and atraumatic.   Eyes:      Extraocular Movements: Extraocular movements intact.   Pulmonary:      Effort: Pulmonary effort is normal. No respiratory distress.   Neurological:      Mental Status: He is alert and oriented to person, place, and time.          Visit Time  Total Visit Duration: 15        "

## 2024-02-21 ENCOUNTER — SOCIAL WORK (OUTPATIENT)
Dept: BEHAVIORAL/MENTAL HEALTH CLINIC | Facility: CLINIC | Age: 49
End: 2024-02-21
Payer: COMMERCIAL

## 2024-02-21 DIAGNOSIS — F90.0 ADHD, PREDOMINANTLY INATTENTIVE TYPE: Primary | ICD-10-CM

## 2024-02-21 DIAGNOSIS — F33.0 MAJOR DEPRESSIVE DISORDER, RECURRENT, MILD (HCC): ICD-10-CM

## 2024-02-21 DIAGNOSIS — F41.1 GENERALIZED ANXIETY DISORDER: ICD-10-CM

## 2024-02-21 PROCEDURE — 90791 PSYCH DIAGNOSTIC EVALUATION: CPT | Performed by: COUNSELOR
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
## 2024-02-27 ENCOUNTER — SOCIAL WORK (OUTPATIENT)
Dept: BEHAVIORAL/MENTAL HEALTH CLINIC | Facility: CLINIC | Age: 49
End: 2024-02-27
Payer: COMMERCIAL

## 2024-02-27 DIAGNOSIS — F90.0 ADHD, PREDOMINANTLY INATTENTIVE TYPE: ICD-10-CM

## 2024-02-27 DIAGNOSIS — F33.0 MAJOR DEPRESSIVE DISORDER, RECURRENT, MILD (HCC): Primary | ICD-10-CM

## 2024-02-27 DIAGNOSIS — F41.1 GENERALIZED ANXIETY DISORDER: ICD-10-CM

## 2024-02-27 PROCEDURE — 90837 PSYTX W PT 60 MINUTES: CPT | Performed by: COUNSELOR

## 2024-02-27 NOTE — BH TREATMENT PLAN
"Outpatient Behavioral Health Psychotherapy Treatment Plan    Emil Jacobs  1975     Date of Initial Psychotherapy Assessment: 2/21/2024   Date of Current Treatment Plan: 02/27/24  Treatment Plan Target Date: 8/27/2024  Treatment Plan Expiration Date: 8/31/2024    Diagnosis:   1. Major depressive disorder, recurrent, mild (HCC)        2. ADHD, predominantly inattentive type        3. Generalized anxiety disorder            Area(s) of Need: Anxiety management & mood regulation    Long Term Goal 1 (in the client's own words): \" I have some major insecurities - things I think I'm doing right but others think I'm wrong - most especially my wife.  I need someone to talk to about it. If I'm as bad as she says I am I having a lot of changing to do so I need a reality check.\"     Stage of Change: Contemplation    Target Date for completion: August 27, 2024     Anticipated therapeutic modalities: Client centered, cognitive behavioral, solution focused     People identified to complete this goal: Emil Guerrero (client) & Zeinab Saab (therapist)      Objective 1: (identify the means of measuring success in meeting the objective): Emil will begin to identify and observe patterns in his relationships that create stress and conflict. He will begin to observe what he sees as his part versus others and will verbalize his observations in sessions.       Objective 2: (identify the means of measuring success in meeting the objective): Emil will develop and implement at least two strategies to manage his emotions, thoughts and behaviors in these conflicts ( 2 in each domain). He will report success or setbacks in sessions.       Long Term Goal 2 (in the client's own words): \"I over think things and worry about things that will never come to fruition. My mind is always consumed by it - it gets hard to focus sometimes. This may also affect my mood.\"     Stage of Change: Contemplation    Target Date for completion: August 27, " "2024     Anticipated therapeutic modalities: Client  centered, cognitive behavioral, mindfulness strategies     People identified to complete this goal: Emil Guerrero (client) & Zeinab Saab (therapist)      Objective 1: (identify the means of measuring success in meeting the objective): Emil will begin to observe and identify patterns of worry - how ,when, content, and any physical reactions or mood changes. He will verbalize observations in sessions.      Objective 2: (identify the means of measuring success in meeting the objective): Emil will create and utilize at least two coping strategies to reduce worry and replace negative thoughts. He will report success or roadblocks in sessions.       I am currently under the care of a [facility] psychiatric provider: yes    My [facility] psychiatric provider is: MARKUS Castillo    I am currently taking psychiatric medications: Yes, as prescribed    I feel that I will be ready for discharge from mental health care when I reach the following (measurable goal/objective): \" Once I get the answers I'm looking for  - I'm looking for an unbiased opinion to help me change.\"    For children and adults who have a legal guardian:   Has there been any change to custody orders and/or guardianship status? NA. If yes, attach updated documentation.    I have created my Crisis Plan and have been offered a copy of this plan    Behavioral Health Treatment Plan  Luke: Diagnosis and Treatment Plan explained to Emil Jacobs acknowledges an understanding of their diagnosis. Emil Jacobs agrees to this treatment plan.    I have been offered a copy of this Treatment Plan. yes        "

## 2024-02-27 NOTE — BH CRISIS PLAN
Client Name: Emil Jacobs       Client YOB: 1975    [place] Safety Plan      Creation Date: 2/27/24 Update Date: 2/27/25   Created By: Mar Saab LPC Last Updated By: Mar Saab LPC      Step 1: Warning Signs:   Warning Signs   I shut down, stop talking,heart rate goes up, sweat,            Step 2: Internal Coping Strategies:   Internal Coping Strategies   go to work, go for a walk, take motorcycle out for a ride            Step 3: People and social settings that provide distraction:   Name Contact Information   friends - especially Asael call, text, or see    Places   work, walk, go to the business,           Step 4: People whom I can ask for help during a crisis:      Name Contact Information    Sister - Denzel phone      Step 5: Professionals or agencies I can contact during a crisis:      Clinican/Agency Name Phone Emergency Contact    Zeinab - therapist [phone] Nicanor Rivero [phone]    James - med provider [phone]       Local Emergency Department Emergency Department Phone Emergency Department Address    [facility]          Crisis Phone Numbers:   Suicide Prevention Lifeline: Call or Text  075 Crisis Text Line: Text HOME to 243-551   Please note: Some [facility] do not have a separate number for Child/Adolescent specific crisis. If your county is not listed under Child/Adolescent, please call the adult number for your county      Adult Crisis Numbers: Child/Adolescent Crisis Numbers   [facility]: [phone] [facility]: [phone]   [facility]: [phone] [facility]: [phone]   [facility]: [phone] [place]: [phone]   [facility]: [phone] [place]: [phone]   [place] [facility]: [phone]   [facility]: [phone]   [facility]: [phone]   [facility]: [phone] (daytime) [phone] (after hours, weekends, holidays)      Step 6:  Making the environment safer (plan for lethal means safety):   Plan: Patient denied any suicidal thoughts or plans nor homicidal ideation or plans.      Optional: What is most important to me and worth living for?   My family, my friends, I love life     El Safety Plan. Olivia Escobar and Satinder Quintanilla. Used with permission of the authors.

## 2024-02-27 NOTE — PSYCH
"Behavioral Health Psychotherapy Progress Note    Psychotherapy Provided: Individual Psychotherapy     1. Major depressive disorder, recurrent, mild (HCC)        2. ADHD, predominantly inattentive type        3. Generalized anxiety disorder            Goals addressed in session: Goal 1     DATA: Emil was in person for session. Discussed and created treatment goals and safety plan. Began to discuss his relationships and how he experiences communication with others especially family.   During this session, this clinician used the following therapeutic modalities: Client-centered Therapy and Cognitive Behavioral Therapy    Substance Abuse was not addressed during this session. If the client is diagnosed with a co-occurring substance use disorder, please indicate any changes in the frequency or amount of use: not discussed. Stage of change for addressing substance use diagnoses: No substance use/Not applicable    ASSESSMENT:  Emil Jacobs presents with a Euthymic/ normal mood.     his affect is Normal range and intensity, which is congruent, with his mood and the content of the session. The client has made progress on their goals.    Created treatment goals today.  Emil Jacobs presents with a none risk of suicide, none risk of self-harm, and none risk of harm to others.    For any risk assessment that surpasses a \"low\" rating, a safety plan must be developed.    A safety plan was indicated: no  If yes, describe in detail not applicable    PLAN: Between sessions, Emil Jacobs will begin to observe patterns in his communication with others. . At the next session, the therapist will use Client-centered Therapy and Cognitive Behavioral Therapy to address anxiety management.    Behavioral Health Treatment Plan and Discharge Planning: Emil Jacobs is aware of and agrees to continue to work on their treatment plan. They have identified and are working toward their discharge goals. yes    Visit start and stop " times:    02/27/24  Start Time: 1009  Stop Time: 1055  Total Visit Time: 46 minutes

## 2024-03-04 DIAGNOSIS — F90.0 ADHD, PREDOMINANTLY INATTENTIVE TYPE: ICD-10-CM

## 2024-03-04 DIAGNOSIS — R68.82 LIBIDO, DECREASED: ICD-10-CM

## 2024-03-04 RX ORDER — DEXTROAMPHETAMINE SACCHARATE, AMPHETAMINE ASPARTATE MONOHYDRATE, DEXTROAMPHETAMINE SULFATE AND AMPHETAMINE SULFATE 5; 5; 5; 5 MG/1; MG/1; MG/1; MG/1
20 CAPSULE, EXTENDED RELEASE ORAL EVERY MORNING
Qty: 30 CAPSULE | Refills: 0 | Status: SHIPPED | OUTPATIENT
Start: 2024-03-04

## 2024-03-04 NOTE — TELEPHONE ENCOUNTER
Medication Refill Request     Name of Medication  amphetamine-dextroamphetamine (ADDERALL XR, 20MG,) 20 MG 24 hr capsule   Dose/Frequency   Take 1 capsule (20 mg total) by mouth every morning Max Daily Amount: 20 mg   Quantity 30  Verified pharmacy   [x]  Verified ordering Provider   [x]  Does patient have enough for the next 3 days? Yes [] No [x]  Does patient have a follow-up appointment scheduled? Yes [x] No []   If so when is appointment: 03/08/24 @ 4 pm

## 2024-03-04 NOTE — TELEPHONE ENCOUNTER
Patient notified by [facility] that prescription . Changed quantity from previous prescription as patient reports that insurance pays for #4 tablets every 30 days and he typically pays out of pocket.    Reason for call:   [x] Refill   [] Prior Auth  [] Other:     Office:   [x] PCP/Provider - [facility] [place]  [] Specialty/Provider -     Medication: tadalafil    Dose/Frequency: 20mg    Quantity: 30    Pharmacy: [place] [id number] [place] [address]     Does the patient have enough for 3 days?   [x] Yes   [] No - Send as HP to POD

## 2024-03-05 ENCOUNTER — APPOINTMENT (OUTPATIENT)
Dept: RADIOLOGY | Facility: CLINIC | Age: 49
End: 2024-03-05
Payer: COMMERCIAL

## 2024-03-05 ENCOUNTER — OFFICE VISIT (OUTPATIENT)
Dept: OBGYN CLINIC | Facility: CLINIC | Age: 49
End: 2024-03-05
Payer: COMMERCIAL

## 2024-03-05 VITALS — BODY MASS INDEX: 40.18 KG/M2 | WEIGHT: 287 LBS | HEIGHT: 71 IN

## 2024-03-05 DIAGNOSIS — G89.29 CHRONIC RIGHT SHOULDER PAIN: ICD-10-CM

## 2024-03-05 DIAGNOSIS — M25.511 CHRONIC RIGHT SHOULDER PAIN: ICD-10-CM

## 2024-03-05 DIAGNOSIS — M76.61 TENDONITIS, ACHILLES, RIGHT: ICD-10-CM

## 2024-03-05 DIAGNOSIS — M25.571 PAIN, JOINT, ANKLE AND FOOT, RIGHT: Primary | ICD-10-CM

## 2024-03-05 DIAGNOSIS — M75.21 BICEPS TENDINITIS OF RIGHT SHOULDER: ICD-10-CM

## 2024-03-05 DIAGNOSIS — S43.431D SUPERIOR GLENOID LABRUM LESION OF RIGHT SHOULDER, SUBSEQUENT ENCOUNTER: ICD-10-CM

## 2024-03-05 DIAGNOSIS — M25.571 PAIN, JOINT, ANKLE AND FOOT, RIGHT: ICD-10-CM

## 2024-03-05 PROCEDURE — 73610 X-RAY EXAM OF ANKLE: CPT

## 2024-03-05 PROCEDURE — 99214 OFFICE O/P EST MOD 30 MIN: CPT | Performed by: ORTHOPAEDIC SURGERY

## 2024-03-05 RX ORDER — TADALAFIL 20 MG/1
20 TABLET ORAL DAILY PRN
Qty: 30 TABLET | Refills: 5 | Status: SHIPPED | OUTPATIENT
Start: 2024-03-05

## 2024-03-05 NOTE — PROGRESS NOTES
Assessment/Plan:  1. Pain, joint, ankle and foot, right  XR ankle 3+ vw right      2. Tendonitis, Achilles, right        3. Biceps tendinitis of right shoulder  MRI arthrogram right shoulder      4. Chronic right shoulder pain  MRI arthrogram right shoulder      5. Superior glenoid labrum lesion of right shoulder, subsequent encounter  MRI arthrogram right shoulder        Scribe Attestation    I,:  Keren Otto am acting as a scribe while in the presence of the attending physician.:       I,:  Selvin Saleh MD personally performed the services described in this documentation    as scribed in my presence.:           Emil is a pleasant 48 y.o. male who presents for initial evaluation of right ankle and repeat evaluation of right shoulder. Based on patients clinical examination and failure of conservative treatment options including but not limited to physician directed HEP for 6 weeks, activity modification, and OTC NSAIDs an MRI is warranted for further evaluation of suspected SLAP tear. Follow up after MRI.     In regards to his right ankle pain. Upon review of the right ankle x-ray, a thorough history and my examination, Emil is presenting with signs and symptoms consistent with achilles tendonitis. Diagnosis and treatment options discussed all his questions were addressed. Home exercise program provided, recommend activities as tolerated. May use OTC NSAIDS as needed.       Subjective: Follow up     Emil Jacobs is a 48 y.o. male who presents for follow-up evaluation of right shoulder and initial evaluation of right ankle.  Regards to his right shoulder patient has bicep tendinitis, suspicion for SLAP tear of the right shoulder on exam.  Patient was provided a physician directed home exercise program and a prescription for meloxicam at his last visit.  Today he notes no improvement in his overall right shoulder pain.  Pain is in the anterior shoulder it is a constant ache that increases in severity with  activity above 90 degrees.    In regards to his right ankle patient states he first noticed discomfort approximately 8 months ago without injury or trauma.  Pain has increased in severity over time.  Pain is localized to the posterior ankle/Achilles tendon. He denies dysfunction or weakness. Denies numbness or paresthesias.       Review of Systems   Constitutional:  Negative for chills and fever.   HENT:  Negative for ear pain and sore throat.    Eyes:  Negative for pain and visual disturbance.   Respiratory:  Negative for cough and shortness of breath.    Cardiovascular:  Negative for chest pain and palpitations.   Gastrointestinal:  Negative for abdominal pain and vomiting.   Genitourinary:  Negative for dysuria and hematuria.   Musculoskeletal:  Negative for arthralgias and back pain.   Skin:  Negative for color change and rash.   Neurological:  Negative for seizures and syncope.   All other systems reviewed and are negative.        Past Medical History:   Diagnosis Date   • Chronic pain disorder     lumbar    • CPAP (continuous positive airway pressure) dependence    • GERD (gastroesophageal reflux disease)     diet controlled   • Helicobacter pylori gastritis 4/18/2017   • Sleep apnea     does not wear CPAP       Past Surgical History:   Procedure Laterality Date   • COLONOSCOPY N/A 4/7/2017    Procedure: COLONOSCOPY;  Surgeon: Lacey Dennis MD;  Location: [facility];  Service:    • ESOPHAGOGASTRODUODENOSCOPY N/A 4/7/2017    Procedure: ESOPHAGOGASTRODUODENOSCOPY (EGD);  Surgeon: Lacey Dennis MD;  Location: [facility];  Service:    • KNEE ARTHROSCOPY Left     x4 and ACL repair   • KNEE ARTHROSCOPY Right     x2 and ACL repair   • KNEE ARTHROSCOPY W/ AUTOGENOUS CARTILAGE IMPLANTATION (ACI) PROCEDURE Left    • SINUS SURGERY     • TONSILLECTOMY     • UPPER GASTROINTESTINAL ENDOSCOPY         Family History   Problem Relation Age of Onset   • Breast cancer Mother    • No Known Problems Father    • Cancer  Sister         breast   • Breast cancer Sister    • Liver cancer Maternal Grandmother    • Colon cancer Paternal Grandmother    • Mental illness Neg Hx    • Diabetes Neg Hx        Social History     Occupational History   • Not on file   Tobacco Use   • Smoking status: Former     Current packs/day: 0.00     Average packs/day: 0.5 packs/day for 5.0 years (2.5 ttl pk-yrs)     Types: Cigarettes     Start date:      Quit date:      Years since quittin.1   • Smokeless tobacco: Never   Vaping Use   • Vaping status: Never Used   Substance and Sexual Activity   • Alcohol use: Yes     Comment: daily   • Drug use: No   • Sexual activity: Not on file         Current Outpatient Medications:   •  amphetamine-dextroamphetamine (ADDERALL XR, 20MG,) 20 MG 24 hr capsule, Take 1 capsule (20 mg total) by mouth every morning Max Daily Amount: 20 mg, Disp: 30 capsule, Rfl: 0  •  meloxicam (MOBIC) 15 mg tablet, TAKE 1 TABLET (15 MG TOTAL) BY MOUTH DAILY., Disp: 30 tablet, Rfl: 0  •  tadalafil (CIALIS) 20 MG tablet, Take 1 tablet (20 mg total) by mouth daily as needed for erectile dysfunction, Disp: 30 tablet, Rfl: 5  •  valsartan (DIOVAN) 80 mg tablet, Take 80 mg by mouth every morning, Disp: , Rfl:     Allergies   Allergen Reactions   • Tetracycline Rash     Severe itching       Objective:  Vitals:       Right Ankle Exam     Tenderness   The patient is experiencing no tenderness.  Swelling: none    Range of Motion   The patient has normal right ankle ROM.    Muscle Strength   The patient has normal right ankle strength.    Tests   Anterior drawer: negative  Varus tilt: negative    Other   Erythema: absent  Sensation: normal  Pulse: present     Comments:  Trace effusion noted distal achilles  (-) oquendo test    No anatomical deformity  Skin is warm and dry to touch with no signs of erythema, ecchymosis, infection  Calf compartments are soft and supple  2+ TP and DP pulses with brisk capillary refill to the toes  Sural,  saphenous, tibial, superficial, and deep peroneal motor and sensory distributions intact  Sensation to light touch intact distally        Right Shoulder Exam     Tenderness   The patient is experiencing tenderness in the biceps tendon.    Muscle Strength   Abduction: 5/5   Internal rotation: 5/5   External rotation: 5/5   Biceps: 5/5     Tests   Sparks test: positive  Cross arm: positive    Other   Erythema: absent  Sensation: normal  Pulse: present    Comments:    no anatomical deformity  Skin is warm and dry to touch with no signs of erythema, ecchymosis, or infection  no soft tissue swelling or effusion noted  Demonstrates normal elbow, wrist, and finger motion  2+  distal radial pulse with brisk capillary refill to the fingers  Radial, median, ulnar and motor  and sensory distribution intact  Sensation to light touch intact distally            Strength/Myotome Testing     Right Ankle/Foot   Normal strength      Physical Exam  Vitals and nursing note reviewed.   Constitutional:       Appearance: Normal appearance.   HENT:      Head: Normocephalic.   Eyes:      Extraocular Movements: Extraocular movements intact.   Cardiovascular:      Rate and Rhythm: Normal rate.      Pulses: Normal pulses.   Pulmonary:      Effort: Pulmonary effort is normal.   Musculoskeletal:         General: Normal range of motion.      Cervical back: Normal range of motion.      Comments: See ortho exam   Skin:     General: Skin is warm and dry.   Neurological:      General: No focal deficit present.      Mental Status: He is alert.   Psychiatric:         Behavior: Behavior normal.         I have personally reviewed pertinent films in PACS and my interpretation is as follows:    X-ray of the right ankle obtained today demonstrate no acute osseous abnormalities    This document was created using speech voice recognition software.   Grammatical errors, random word insertions, pronoun errors, and incomplete sentences are an occasional  consequence of this system due to software limitations, ambient noise, and hardware issues.   Any formal questions or concerns about content, text, or information contained within the body of this dictation should be directly addressed to the provider for clarification.

## 2024-03-05 NOTE — PATIENT INSTRUCTIONS
Achilles Tendonitis (Tendinitis)  Tendonitis a swelling and soreness of the tendon. The pain in the tendon (cord like structure which attaches muscle to bone) is produced by tiny tears and the inflammation present in that tendon. It commonly occurs at the shoulders, heels, and elbows. It is usually caused by overusing the tendon and joint involved. Achilles tendonitis involves the Achilles tendon. This is the large tendon in the back of the leg just above the foot. It attaches the large muscles of the lower leg to the heel bone (called calcaneus).   This diagnosis (learning what is wrong) is made by examination. X-rays will be generally be normal if only tendonitis is present. However, occasionally with insertional achilles tendinitis, there can be a calcification (enthesopathy/bone spur) noted.    HOME CARE INSTRUCTIONS  Apply ice to the injury for 10 to 20 minutes 3 or 4 times per day. Put the ice in a plastic bag and place a towel between the bag of ice and your skin.   Use Heel lifts as instructed  Try to avoid use other than gentle range of motion while the tendon is painful. Do not resume use until instructed by your caregiver. Then begin use gradually. Do not increase use to the point of pain. If pain does develop, decrease use and continue the above measures. Gradually increase activities that do not cause discomfort until you gradually achieve normal use.   Only take over-the-counter or prescription medicines for pain, discomfort, or fever as directed by your caregiver. The Medrol dose pack, if prescribed, will only last a few days. It is important to follow the other instructions in order to see improvements that last.  Specific achilles stretches and rehab are usually required for tendinitis that does not improve with acute treatment. Typically these are done under the care of a physical therapist.    SEEK MEDICAL CARE IF:  Your pain and swelling increase or pain is uncontrolled with medications.   You  develop new, unexplained problems (symptoms) or an increase of the symptoms that brought you to your caregiver.   You develop an inability to move your toes or foot, develop warmth and swelling in your foot, or begin running an unexplained temperature.     MAKE SURE YOU:   Understand these instructions.   Will watch your condition.   Will get help right away if you are not doing well or get worse.     Rehab  Achilles tendonitis is disorder of the Achilles tendon. The Achiles tendon connects the large calf muscles (Gastrocnemius and Soleus) to the heel bone (calcaneus). This tendon is sometimes called the heel cord. It is important for pushing-off and standing on your toes and is important for walking, running, or jumping. Tendonitis often caused by overuse and repetitive microtrauma.    SYMPTOMS  Pain, tenderness, swelling, warmth, and redness may occur over the Achilles tendon even at rest.   Pain with pushing off, or flexing or extending the ankle.   Pain that is worsened after or during activity.     CAUSES  Over use sometimes seen with rapid increase in exercise programs or in sports requiring running and jumping.   Poor physical conditioning (strength and flexibility/endurance).   Running sports, especially training running down hills.   Inadequate warm-up before practice or play or failure to stretch before participation.   Injury to the tendon.     PREVENTION   Warm up and stretch before practice or competition.   Allow time for adequate rest and recovery between practices and competition.   Keep up conditioning.   Keep up ankle and leg flexibility.   Improve or keep muscle strength and endurance.   Improve cardiovascular fitness.   Use proper technique.   Use of proper equipment (shoes, skates, etc)   To help prevent recurrence, taping, protective strapping, or an adhesive bandage may be recommended for several weeks after healing is complete.     PROGNOSIS  Recovery may take weeks to several months to heal.    Longer recovery is expected if symptoms have been prolonged   Recovery is usually quicker if the inflammation is due to a direct blow as compared with overuse or sudden strain.     COMPLICATIONS   Healing time will be prolonged if the condition is not correctly treated. The injury must be given plenty of time to heal.   Symptoms can reoccur if activity is resumed too soon.   Untreated, tendinitis may increase the risk of tendon rupture requiring additional time for recovery and possibly surgery.     TREATMENT   The first treatment consists of, rest, anti-inflammatory medication and ice to relieve the pain.   Stretching and strengthening exercises after resolution of pain, will likely help reduce the risk of recurrence. Referral to a physical therapist or  for further evaluation and treatment may be helpful.   A walking boot or cast may be recommended to rest the Achilles tendon. This can help break the cycle of inflammation and microtrauma.   Arch supports (orthotics ) may be prescribed or recommended by your caregiver as an adjunct to therapy and rest.   Surgery to remove the inflamed tendon lining or degenerated tendon tissue is rarely necessary and has shown less than predictable results.     MEDICATION   Nonsteroidal anti-inflammatory medications, such as aspirin and ibuprofen, may be used for pain and inflammation relief. Do not take within 7 days before surgery. Take these as directed by your caregiver. Contact your caregiver immediately if any bleeding, stomach upset, or signs of allergic reaction occur. Other minor pain relievers, such as acetaminophen, may also be used.   Pain relievers may be prescribed as necessary by your caregiver. Do not take prescription pain medication for longer than 4 to 7 days. Use only as directed and only as much as you need.   Cortisone injections are rarely if ever indicated. Cortisone injections may weaken tendons and predispose to rupture. It is better to  give the condition more time to heal than to use them.     HEAT AND COLD:   Cold is used to relieve pain and reduce inflammation for acute and chronic Achilles tendinitis. Cold should be applied for 10 to 15 minutes every 2 to 3 hours for inflammation and pain and immediately after any activity that aggravates your symptoms. Use ice packs or an ice massage.   Heat may be used before performing stretching and strengthening activities prescribed by your caregiver. Use a heat pack or a warm soak.     SEEK MEDICAL CARE IF:   Symptoms get worse or do not improve in 2 weeks despite treatment.   New, unexplained symptoms develop. Drugs used in treatment may produce side effects.     EXERCISES  RANGE OF MOTION AND STRETCHING EXERCISES - Achilles Tendinitis   These exercises may help you when beginning to rehabilitate your injury. Your symptoms may resolve with or without further involvement from your physician, physical therapist or . While completing these exercises, remember:   Restoring tissue flexibility helps normal motion to return to the joints. This allows healthier, less painful movement and activity.   An effective stretch should be held for at least 30 seconds.   A stretch should never be painful. You should only feel a gentle lengthening or release in the stretched tissue.     STRETCH - Gastroc, Standing   Place hands on wall.   Extend leg, keeping the front knee somewhat bent.   Slightly point your toes inward on your back foot.   Keeping your heel on the floor and your knee straight, shift your weight toward the wall, not allowing your back to arch.   You should feel a gentle stretch in the calf. Hold this position for 10 seconds.   STRETCH - Soleus, Standing   Place hands on wall.   Extend leg, keeping the other knee somewhat bent.   Slightly point your toes inward on your back foot.   Keep your heel on the floor, bend your back knee, and slightly shift your weight over the back leg so that  you feel a gentle stretch deep in your back calf.   Hold this position for 10 seconds.   .  STRETCH - Gastrocsoleus, Standing   Note: This exercise can place a lot of stress on your foot and ankle. Please complete this exercise only if specifically instructed by your caregiver.   Place the ball of your foot on a step, keeping your other foot firmly on the same step.   Hold on to the wall or a rail for balance.   Slowly lift your other foot, allowing your body weight to press your heel down over the edge of the step.   You should feel a stretch in your calf.   Hold this position for 10 seconds.   Repeat this exercise with a slight bend in your knee.     STRENGTHENING EXERCISES - Achilles Tendinitis  These exercises may help you when beginning to rehabilitate your injury. They may resolve your symptoms with or without further involvement from your physician, physical therapist or . While completing these exercises, remember:   Muscles can gain both the endurance and the strength needed for everyday activities through controlled exercises.   Complete these exercises as instructed by your physician, physical therapist or . Progress the resistance and repetitions only as guided.   You may experience muscle soreness or fatigue, but the pain or discomfort you are trying to eliminate should never worsen during these exercises. If this pain does worsen, stop and make certain you are following the directions exactly. If the pain is still present after adjustments, discontinue the exercise until you can discuss the trouble with your clinician.     STRENGTH - Plantar-flexors   Sit with your affected leg extended. Holding onto both ends of a rubber exercise band/tubing, loop it around the ball of your foot. Keep a slight tension in the band.   Slowly push your toes away from you, pointing them downward.   Hold this position for 10 seconds. Return slowly, controlling the tension in the band/tubing.      STRENGTH - Plantar-flexors   Stand with your feet shoulder width apart. Steady yourself with a wall or table using as little support as needed.   Keeping your weight evenly spread over the width of your feet, rise up on your toes.     STRENGTH - Plantar-flexors, Eccentric   Note: This exercise can place a lot of stress on your foot and ankle. Please complete this exercise only if specifically instructed by your caregiver.   Place the balls of your feet on a step. With your hands, use only enough support from a wall or rail to keep your balance.   Keep your knees straight and rise up on your toes.   Slowly shift your weight entirely to your toes and  your opposite foot. Gently and with controlled movement, lower your weight through your foot so that your heel drops below the level of the step. You will feel a slight stretch in the back of your calf at the end position.   Use the healthy leg to help rise up onto the balls of both feet, then lower weight only on the leg again. Build up to 15 repetitions. Then progress to 3 consecutive sets of 15 repetitions.*   After completing the above exercise, complete the same exercise with a slight knee bend (about 30 degrees). Again, build up to 15 repetitions. Then progress to 3 consecutive sets of 15 repetitions.*   *When you easily complete 3 sets of 15, your physician, physical therapist or  may advise you to add resistance by wearing a backpack filled with additional weight.    STRENGTH - Plantar Flexors, Seated   Sit on a chair that allows your feet to rest flat on the ground. If necessary, sit at the edge of the chair.   Keeping your toes firmly on the ground, lift your heel as far as you can without increasing any discomfort in your ankle.

## 2024-03-08 ENCOUNTER — SOCIAL WORK (OUTPATIENT)
Dept: BEHAVIORAL/MENTAL HEALTH CLINIC | Facility: CLINIC | Age: 49
End: 2024-03-08
Payer: COMMERCIAL

## 2024-03-08 DIAGNOSIS — F41.1 GENERALIZED ANXIETY DISORDER: ICD-10-CM

## 2024-03-08 DIAGNOSIS — F90.0 ADHD, PREDOMINANTLY INATTENTIVE TYPE: ICD-10-CM

## 2024-03-08 DIAGNOSIS — F33.0 MAJOR DEPRESSIVE DISORDER, RECURRENT, MILD (HCC): Primary | ICD-10-CM

## 2024-03-08 PROCEDURE — 90834 PSYTX W PT 45 MINUTES: CPT | Performed by: COUNSELOR

## 2024-03-08 NOTE — PSYCH
"Behavioral Health Psychotherapy Progress Note    Psychotherapy Provided: Individual Psychotherapy     1. Major depressive disorder, recurrent, mild (HCC)        2. Generalized anxiety disorder        3. ADHD, predominantly inattentive type            Goals addressed in session: Goal 1     DATA: Emil was in person for session.  Discussed family dynamics, relationship with his children and noted children's observations of the relationship with his wife.  Gave an example of one of their typical conflicts.  Noted that she reverts to name calling but did note his own tendency to override decisions after a period of time.  Continues to state what a wonderful mother she is and how much he loves her.  Suggested that he refrain from advocating for her when he is trying to explain how she has hurt or offended him in order for me to understand his full emotions.  Trying to give him permission to feel less guilty about experiencing his own feelings.  During this session, this clinician used the following therapeutic modalities: Client-centered Therapy and Cognitive Behavioral Therapy    Substance Abuse was not addressed during this session. If the client is diagnosed with a co-occurring substance use disorder, please indicate any changes in the frequency or amount of use: Not applicable. Stage of change for addressing substance use diagnoses: Pre-contemplation    ASSESSMENT:  Emil Jacobs presents with a Euthymic/ normal and Anxious mood.     his affect is Normal range and intensity, which is congruent, with his mood and the content of the session. The client has made progress on their goals.    Beginning to address feelings about how he is treated in the family and explore relationship dynamics.  Emil Jacobs presents with a none risk of suicide, none risk of self-harm, and none risk of harm to others.    For any risk assessment that surpasses a \"low\" rating, a safety plan must be developed.    A safety plan was indicated: " no  If yes, describe in detail not applicable    PLAN: Between sessions, Emil Jacobs will continue to explore his feelings and observe his behaviors as a response. At the next session, the therapist will use Client-centered Therapy and Cognitive Behavioral Therapy to address anxiety management and mood regulation.    Behavioral Health Treatment Plan and Discharge Planning: Emil Jacobs is aware of and agrees to continue to work on their treatment plan. They have identified and are working toward their discharge goals. yes    Visit start and stop times:    03/08/24  Start Time: 1401  Stop Time: 1446  Total Visit Time: 45 minutes

## 2024-03-12 ENCOUNTER — SOCIAL WORK (OUTPATIENT)
Dept: BEHAVIORAL/MENTAL HEALTH CLINIC | Facility: CLINIC | Age: 49
End: 2024-03-12
Payer: COMMERCIAL

## 2024-03-12 DIAGNOSIS — F41.1 GENERALIZED ANXIETY DISORDER: ICD-10-CM

## 2024-03-12 DIAGNOSIS — F33.0 MAJOR DEPRESSIVE DISORDER, RECURRENT, MILD (HCC): Primary | ICD-10-CM

## 2024-03-12 DIAGNOSIS — F90.0 ADHD, PREDOMINANTLY INATTENTIVE TYPE: ICD-10-CM

## 2024-03-12 PROCEDURE — 90834 PSYTX W PT 45 MINUTES: CPT | Performed by: COUNSELOR
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
## 2024-03-15 ENCOUNTER — OFFICE VISIT (OUTPATIENT)
Dept: PSYCHIATRY | Facility: CLINIC | Age: 49
End: 2024-03-15

## 2024-03-15 DIAGNOSIS — Z91.199 NO-SHOW FOR APPOINTMENT: Primary | ICD-10-CM

## 2024-03-15 NOTE — PSYCH
This note was not shared with the patient due to reasonable likelihood of causing patient harm     No Call. No Show. No Charge    Emil Jacobs no showed 03/15/24 appointment , staff called and left message to reschedule appointment     Treatment Plan not due at this session.

## 2024-03-26 ENCOUNTER — SOCIAL WORK (OUTPATIENT)
Dept: BEHAVIORAL/MENTAL HEALTH CLINIC | Facility: CLINIC | Age: 49
End: 2024-03-26
Payer: COMMERCIAL

## 2024-03-26 DIAGNOSIS — F33.0 MAJOR DEPRESSIVE DISORDER, RECURRENT, MILD (HCC): Primary | ICD-10-CM

## 2024-03-26 DIAGNOSIS — F41.1 GENERALIZED ANXIETY DISORDER: ICD-10-CM

## 2024-03-26 PROCEDURE — 90834 PSYTX W PT 45 MINUTES: CPT | Performed by: COUNSELOR

## 2024-03-26 NOTE — PSYCH
"Behavioral Health Psychotherapy Progress Note    Psychotherapy Provided: Individual Psychotherapy     1. Major depressive disorder, recurrent, mild (HCC)        2. Generalized anxiety disorder            Goals addressed in session: Goal 1     DATA: Emil was in person for session.  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  Also discussed concern about son beginning to drive and some options he had to \"control\" his behavior to keep him safe.  Noted where he was being overly cautious but was able to hear suggestions and revise his plan.  Continues to have stress in his marriage but is beginning to set gentle limits about how he is spoken to.  Is beginning slowly and being tentative about how he implements these boundaries.  Able to express his feelings of hurt as a result of the negative comments he hears from his wife.  During this session, this clinician used the following therapeutic modalities: Client-centered Therapy, Cognitive Behavioral Therapy, and Supportive Psychotherapy    Substance Abuse was not addressed during this session. If the client is diagnosed with a co-occurring substance use disorder, please indicate any changes in the frequency or amount of use: Not applicable. Stage of change for addressing substance use diagnoses: Pre-contemplation    ASSESSMENT:  Emil Jacobs presents with a Euthymic/ normal mood.     his affect is Normal range and intensity, which is congruent, with his mood and the content of the session. The client has made progress on their goals.    Continues to bring information as therapeutic material and is willing to take feedback and hear suggestions as a result.  Beginning to examine his side of the street as well as others reactions to him.  Emil Jacobs presents with a none risk of suicide, none risk of self-harm, and none risk of harm to others.    For any risk assessment that surpasses a \"low\" rating, a safety plan must be " developed.    A safety plan was indicated: no  If yes, describe in detail not applicable    PLAN: Between sessions, Emil Jacobs will continue to observe his own behaviors, thoughts, feelings, and reactions and family situations. At the next session, the therapist will use Client-centered Therapy and Cognitive Behavioral Therapy to address mood regulation.    Behavioral Health Treatment Plan and Discharge Planning: Emil Jacobs is aware of and agrees to continue to work on their treatment plan. They have identified and are working toward their discharge goals. yes    Visit start and stop times:    03/26/24  Start Time: 1004  Stop Time: 1050  Total Visit Time: 46 minutes

## 2024-04-01 ENCOUNTER — HOSPITAL ENCOUNTER (OUTPATIENT)
Dept: RADIOLOGY | Facility: HOSPITAL | Age: 49
Discharge: HOME | End: 2024-04-01
Attending: ORTHOPAEDIC SURGERY
Payer: COMMERCIAL

## 2024-04-01 ENCOUNTER — HOSPITAL ENCOUNTER (OUTPATIENT)
Dept: RADIOLOGY | Facility: HOSPITAL | Age: 49
Discharge: HOME | End: 2024-04-01
Attending: ORTHOPAEDIC SURGERY | Admitting: RADIOLOGY
Payer: COMMERCIAL

## 2024-04-01 DIAGNOSIS — M25.511 CHRONIC RIGHT SHOULDER PAIN: ICD-10-CM

## 2024-04-01 DIAGNOSIS — S43.431D SUPERIOR GLENOID LABRUM LESION OF RIGHT SHOULDER, SUBSEQUENT ENCOUNTER: ICD-10-CM

## 2024-04-01 DIAGNOSIS — M75.21 BICEPS TENDINITIS OF RIGHT SHOULDER: ICD-10-CM

## 2024-04-01 DIAGNOSIS — G89.29 CHRONIC RIGHT SHOULDER PAIN: ICD-10-CM

## 2024-04-01 PROCEDURE — 73222 MRI JOINT UPR EXTREM W/DYE: CPT

## 2024-04-01 PROCEDURE — A9585 GADOBUTROL INJECTION: HCPCS | Performed by: ORTHOPAEDIC SURGERY

## 2024-04-01 PROCEDURE — 77002 NEEDLE LOCALIZATION BY XRAY: CPT

## 2024-04-01 PROCEDURE — 23350 INJECTION FOR SHOULDER X-RAY: CPT

## 2024-04-01 RX ORDER — LIDOCAINE HYDROCHLORIDE 10 MG/ML
2 INJECTION, SOLUTION EPIDURAL; INFILTRATION; INTRACAUDAL; PERINEURAL
Status: COMPLETED | OUTPATIENT
Start: 2024-04-01 | End: 2024-04-01

## 2024-04-01 RX ORDER — GADOBUTROL 604.72 MG/ML
0.2 INJECTION INTRAVENOUS
Status: COMPLETED | OUTPATIENT
Start: 2024-04-01 | End: 2024-04-01

## 2024-04-01 RX ADMIN — LIDOCAINE HYDROCHLORIDE 2 ML: 10 INJECTION, SOLUTION EPIDURAL; INFILTRATION; INTRACAUDAL; PERINEURAL at 15:05

## 2024-04-01 RX ADMIN — GADOBUTROL 0.2 ML: 604.72 INJECTION INTRAVENOUS at 16:03

## 2024-04-01 RX ADMIN — IOHEXOL 3 ML: 240 INJECTION, SOLUTION INTRATHECAL; INTRAVASCULAR; INTRAVENOUS; ORAL at 15:05

## 2024-04-02 ENCOUNTER — TELEPHONE (OUTPATIENT)
Dept: PSYCHIATRY | Facility: CLINIC | Age: 49
End: 2024-04-02

## 2024-04-02 NOTE — TELEPHONE ENCOUNTER
Patient is calling regarding cancelling an appointment.    Date/Time: 4/2/2024 10:00 am    Reason: had a work emergency     Patient was rescheduled: YES [] NO [x]  If yes, when was Patient reschedule for:     Patient requesting call back to reschedule: YES [] NO [x]    Emil is going out of town and will not be able to r/s this appt but will be here for the next one

## 2024-04-08 ENCOUNTER — TELEMEDICINE (OUTPATIENT)
Dept: PSYCHIATRY | Facility: CLINIC | Age: 49
End: 2024-04-08

## 2024-04-08 DIAGNOSIS — F90.0 ADHD, PREDOMINANTLY INATTENTIVE TYPE: Primary | ICD-10-CM

## 2024-04-08 RX ORDER — DEXTROAMPHETAMINE SACCHARATE, AMPHETAMINE ASPARTATE MONOHYDRATE, DEXTROAMPHETAMINE SULFATE AND AMPHETAMINE SULFATE 5; 5; 5; 5 MG/1; MG/1; MG/1; MG/1
20 CAPSULE, EXTENDED RELEASE ORAL EVERY MORNING
Qty: 30 CAPSULE | Refills: 0 | Status: SHIPPED | OUTPATIENT
Start: 2024-04-08

## 2024-04-08 NOTE — PSYCH
This note was not shared with the patient due to reasonable likelihood of causing patient harm    Virtual Regular Visit    Visit Date: 04/08/24     Verification of patient location:    Patient is located at Other in the following state in which I hold an active license [place]    Problem List Items Addressed This Visit       ADHD, predominantly inattentive type - Primary    Relevant Medications    amphetamine-dextroamphetamine (ADDERALL XR, 20MG,) 20 MG 24 hr capsule     Reason for visit is   Chief Complaint   Patient presents with    Follow-up    Medication Management     Encounter provider Tessa Rojas PA-C    Provider located at [address]  [facility] [address] [phone]    Recent Visits  Date Type Provider Dept   04/02/24 Telephone Mar Saab LPC [facility]   Showing recent visits within past 7 days and meeting all other requirements  Today's Visits  Date Type Provider Dept   04/08/24 Telemedicine Tessa Rojas PA-C [facility]   Showing today's visits and meeting all other requirements  Future Appointments  No visits were found meeting these conditions.  Showing future appointments within next 150 days and meeting all other requirements       The patient was identified by name and date of birth. Emil Jacobs was informed that this is a telemedicine visit and that the visit is being conducted throughthe [employer] platform. He agrees to proceed.  My office door was closed. No one else was in the room. He acknowledged consent and understanding of privacy and security of the video platform. The patient has agreed to participate and understands they can discontinue the visit at any time.    Patient is aware this is a billable service.       SUBJECTIVE:    Emil Jacobs is a giselle 48 y.o. male with a history of ADHD who presents today for follow-up and  medication management. Since his last visit he has been well with no changes. He feels the increased dose of Adderall XR is helpful. He reports good mood, appetite, sleep, and energy. He has been working with [employer] which he is enjoying.     He denies any suicidal ideation, intent or plan at present, denies any homicidal ideation, intent or plan at present.  He denies any auditory hallucinations, denies any visual hallucinations, denies any delusions.  He denies any side effects from current psychiatric medications.    HPI ROS Appetite Changes and Sleep: normal appetite, normal energy level, and normal number of sleep hours    Review Of Systems:     Mood Normal   Behavior Normal    Thought Content Normal   General Normal    Personality Normal   Other Psych Symptoms Normal   Constitutional As Noted in HPI   ENT As Noted in HPI   Cardiovascular As Noted in HPI   Respiratory As Noted in HPI   Gastrointestinal As Noted in HPI   Genitourinary As Noted in HPI   Musculoskeletal As Noted in HPI   Integumentary As Noted in HPI   Neurological As Noted in HPI   Endocrine Normal    Other Symptoms Normal        Substance Abuse History:    Social History     Substance and Sexual Activity   Drug Use No       Family Psychiatric History:     Family History   Problem Relation Age of Onset    Breast cancer Mother     No Known Problems Father     Cancer Sister         breast    Breast cancer Sister     Liver cancer Maternal Grandmother     Colon cancer Paternal Grandmother     Mental illness Neg Hx     Diabetes Neg Hx        Social History     Socioeconomic History    Marital status: /Civil Union     Spouse name: Not on file    Number of children: Not on file    Years of education: Not on file    Highest education level: Not on file   Occupational History    Not on file   Tobacco Use    Smoking status: Former     Current packs/day: 0.00     Average packs/day: 0.5 packs/day for 5.0 years (2.5 ttl pk-yrs)     Types: Cigarettes      Start date:      Quit date:      Years since quittin.2    Smokeless tobacco: Never   Vaping Use    Vaping status: Never Used   Substance and Sexual Activity    Alcohol use: Yes     Comment: daily    Drug use: No    Sexual activity: Not on file   Other Topics Concern    Not on file   Social History Narrative    Not on file     Social Determinants of Health     Financial Resource Strain: Not on file   Food Insecurity: Not on file   Transportation Needs: Not on file   Physical Activity: Not on file   Stress: Not on file   Social Connections: Not on file   Intimate Partner Violence: Not on file   Housing Stability: Not on file       Past Medical History:   Diagnosis Date    Chronic pain disorder     lumbar     CPAP (continuous positive airway pressure) dependence     GERD (gastroesophageal reflux disease)     diet controlled    Helicobacter pylori gastritis 2017    Sleep apnea     does not wear CPAP       Past Surgical History:   Procedure Laterality Date    COLONOSCOPY N/A 2017    Procedure: COLONOSCOPY;  Surgeon: Lacey Dennis MD;  Location: [facility];  Service:     ESOPHAGOGASTRODUODENOSCOPY N/A 2017    Procedure: ESOPHAGOGASTRODUODENOSCOPY (EGD);  Surgeon: Lacey Dennis MD;  Location: [facility];  Service:     FL INJECTION RIGHT SHOULDER (ARTHROGRAM)  2024    KNEE ARTHROSCOPY Left     x4 and ACL repair    KNEE ARTHROSCOPY Right     x2 and ACL repair    KNEE ARTHROSCOPY W/ AUTOGENOUS CARTILAGE IMPLANTATION (ACI) PROCEDURE Left     SINUS SURGERY      TONSILLECTOMY      UPPER GASTROINTESTINAL ENDOSCOPY         Current Outpatient Medications   Medication Sig Dispense Refill    amphetamine-dextroamphetamine (ADDERALL XR, 20MG,) 20 MG 24 hr capsule Take 1 capsule (20 mg total) by mouth every morning Max Daily Amount: 20 mg 30 capsule 0    tadalafil (CIALIS) 20 MG tablet Take 1 tablet (20 mg total) by mouth daily as needed for erectile dysfunction 30 tablet 5    valsartan (DIOVAN) 80  mg tablet Take 80 mg by mouth every morning      meloxicam (MOBIC) 15 mg tablet TAKE 1 TABLET (15 MG TOTAL) BY MOUTH DAILY. 30 tablet 0     No current facility-administered medications for this visit.        Allergies   Allergen Reactions    Doxycycline Itching    Tetracycline Rash     Severe itching       The following portions of the patient's history were reviewed and updated as appropriate: allergies, current medications, past family history, past medical history, past social history, past surgical history, and problem list.    OBJECTIVE:     Mental Status Evaluation:  Appearance:  casually dressed, dressed appropriately, adequate grooming, looks stated age   Behavior:  pleasant, cooperative, interacts appropriately with this writer   Speech:  normal rate, normal volume, normal pitch   Mood:  euthymic   Affect:  mood-congruent   Thought Process:  organized, logical, coherent   Associations: intact associations   Thought Content:  no overt delusions, no paranoia noted on exam   Perceptual Disturbances: no auditory hallucinations, no visual hallucinations   Risk Potential: Suicidal ideation - None  Homicidal ideation - None  Potential for aggression - No   Sensorium:  oriented to person, place, and time/date   Memory:  recent and remote memory grossly intact   Consciousness:  alert and awake   Attention/Concentration: attention span and concentration are age appropriate   Insight:  age appropriate   Judgment: age appropriate   Gait/Station: unable to assess today due to virtual visit   Motor Activity: unable to assess today due to virtual visit     Laboratory Results: No results found for this or any previous visit.    Assessment/Plan:     He reports no changes and feels that the increased dose of Adderall XR is helpful and well tolerated. He is still working on getting the psychological testing done. He will continue to work with Mar Saab LPC, as well. We have discussed their safety plan and pt  agrees that if they experience unsafe thoughts that they will reach out to their supports including this office, the suicide hotline, and emergency services if necessary. Patient is aware of non-emergent and emergent mental health resources. He is able to contract for their own safety at this time.    Will follow up in 4-5 months. Patient is aware to call the office if questions or concerns arise sooner.       Diagnoses and all orders for this visit:    ADHD, predominantly inattentive type  -     amphetamine-dextroamphetamine (ADDERALL XR, 20MG,) 20 MG 24 hr capsule; Take 1 capsule (20 mg total) by mouth every morning Max Daily Amount: 20 mg          Treatment Recommendations/Precautions:    Continue current medications:     - Adderall XR 20 mg qAM    Risks/Benefits      Risks, Benefits And Possible Side Effects Of Medications:  Risks, benefits, and possible side effects of medications explained to patient and patient verbalizes understanding    Controlled Medication Discussion: The patient has been filling controlled prescriptions on time as prescribed to [place] Prescription Drug Monitoring program.      The patient understands the risk of treatment with this class of medication. They are aware of the potential for abuse. Patient agrees not to drive or operate heavy machinery if feeling impaired, to take medication as prescribed, to not drink alcohol when taking this medication, and to not share this medication with others.     Controlled Substance Review    [place]MP or [place]  reviewed: No red flags were identified; safe to proceed with prescription..     Psychotherapy Provided:     Individual psychotherapy provided: No    Visit Start Time:  11:30 AM  Visit End Time:  11:40 AM  Total Visit Duration: 10 minutes     Tessa Rojas PA-C 04/08/24      VIRTUAL VISIT DISCLAIMER    Emil Jacobs verbally agrees to participate in Virtual Care Services. Pt is aware that Virtual Care Services could be limited  without vital signs or the ability to perform a full hands-on physical exam. Emil Jacobs understands he or the provider may request at any time to terminate the video visit and request the patient to seek care or treatment in person.

## 2024-04-16 ENCOUNTER — SOCIAL WORK (OUTPATIENT)
Dept: BEHAVIORAL/MENTAL HEALTH CLINIC | Facility: CLINIC | Age: 49
End: 2024-04-16
Payer: COMMERCIAL

## 2024-04-16 DIAGNOSIS — F41.1 GENERALIZED ANXIETY DISORDER: ICD-10-CM

## 2024-04-16 DIAGNOSIS — F33.0 MAJOR DEPRESSIVE DISORDER, RECURRENT, MILD (HCC): Primary | ICD-10-CM

## 2024-04-16 PROCEDURE — 90837 PSYTX W PT 60 MINUTES: CPT | Performed by: COUNSELOR

## 2024-04-16 NOTE — PSYCH
Behavioral Health Psychotherapy Progress Note    Psychotherapy Provided: Individual Psychotherapy     1. Major depressive disorder, recurrent, mild (HCC)        2. Generalized anxiety disorder            Goals addressed in session: Goal 1     DATA: Emil was in person for session.  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  After attempting to revisit other types thoughts he agreed to follow-up this morning for now and will try to make it work.  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  Discussed the dynamics of working with his partner and his wife who tends to insert herself in the business although she is not in full partnership.  Shared some of the story of how they came to the business and how they hired the manager but how his partner's wife tends to show up and start to boss people around throwing things out of balance.  This week she threatened to fire the manager and Emil needed to come in and settle things.  Observed his tone and speech when dealing with certain situations.  Noted that he has been handling situations with his wife better and is not responding to her negative jobs which has made things calmer on the home front.  During this session, this clinician used the following therapeutic modalities: Client-centered Therapy, Cognitive Behavioral Therapy, and Solution-Focused Therapy    Substance Abuse was not addressed during this session. If the client is diagnosed with a co-occurring substance use disorder, please indicate any changes in the frequency or amount of use: Not applicable. Stage of change for addressing substance use diagnoses: Pre-contemplation    ASSESSMENT:  Emil Jacobs presents with a Euthymic/ normal mood.     his affect is Normal  "range and intensity, which is congruent, with his mood and the content of the session. The client has made progress on their goals.    Able to process therapeutic content and applies to situations outside of sessions.  Stated that he feels he is gaining benefit from the things we discussed and he begins to observe himself and how he responds with others.  Emil Jacobs presents with a none risk of suicide, none risk of self-harm, and none risk of harm to others.    For any risk assessment that surpasses a \"low\" rating, a safety plan must be developed.    A safety plan was indicated: no  If yes, describe in detail not applicable    PLAN: Between sessions, Emil Jacobs will continue to focus on how he reacts and response to others. At the next session, the therapist will use Client-centered Therapy, Cognitive Behavioral Therapy, and Solution-Focused Therapy to address mood regulation.    Behavioral Health Treatment Plan and Discharge Planning: Emil Jacobs is aware of and agrees to continue to work on their treatment plan. They have identified and are working toward their discharge goals. yes    Visit start and stop times:    04/16/24  Start Time: 1003  Stop Time: 1050  Total Visit Time: 47 minutes  "

## 2024-04-17 ENCOUNTER — OFFICE VISIT (OUTPATIENT)
Dept: UROLOGY | Facility: CLINIC | Age: 49
End: 2024-04-17
Payer: COMMERCIAL

## 2024-04-17 ENCOUNTER — TELEPHONE (OUTPATIENT)
Age: 49
End: 2024-04-17

## 2024-04-17 VITALS
SYSTOLIC BLOOD PRESSURE: 142 MMHG | WEIGHT: 280.2 LBS | HEART RATE: 60 BPM | DIASTOLIC BLOOD PRESSURE: 84 MMHG | BODY MASS INDEX: 39.23 KG/M2 | OXYGEN SATURATION: 99 % | HEIGHT: 71 IN

## 2024-04-17 DIAGNOSIS — E29.1 HYPOGONADISM IN MALE: Primary | ICD-10-CM

## 2024-04-17 DIAGNOSIS — N52.9 ED (ERECTILE DYSFUNCTION) OF ORGANIC ORIGIN: ICD-10-CM

## 2024-04-17 PROCEDURE — 99204 OFFICE O/P NEW MOD 45 MIN: CPT | Performed by: UROLOGY

## 2024-04-17 RX ORDER — TESTOSTERONE CYPIONATE 200 MG/ML
50 INJECTION, SOLUTION INTRAMUSCULAR 2 TIMES WEEKLY
Qty: 10 ML | Refills: 3 | Status: SHIPPED | OUTPATIENT
Start: 2024-04-18

## 2024-04-17 NOTE — TELEPHONE ENCOUNTER
PA for TESTOSTERONE    Submitted via    []CMM-KEY   [x]Surescripts-Case ID #  Case ID: [id number]     []Faxed to plan   []Other website   []Phone call Case ID #     Office notes sent, clinical questions answered. Awaiting determination    Turnaround time for your insurance to make a decision on your Prior Authorization can take 7-21 business days.

## 2024-04-17 NOTE — PROGRESS NOTES
"Referring Physician: Frank Lombardi, DO  A copy of this note was sent to the referring physician.       Diagnoses and all orders for this visit:    Hypogonadism in male  -     Testosterone, free, total; Future  -     testosterone cypionate (DEPO-TESTOSTERONE) 200 mg/mL SOLN; Inject 0.25 mL (50 mg total) into a muscle 2 (two) times a week  -     SYRINGE/NEEDLE, DISP, 1 ML 25G X 5/8\" 1 ML MISC; Use once a week  -     Testosterone; Future  -     CBC and Platelet; Future    ED (erectile dysfunction) of organic origin  -     Testosterone, free, total; Future  -     testosterone cypionate (DEPO-TESTOSTERONE) 200 mg/mL SOLN; Inject 0.25 mL (50 mg total) into a muscle 2 (two) times a week  -     SYRINGE/NEEDLE, DISP, 1 ML 25G X 5/8\" 1 ML MISC; Use once a week  -     Testosterone; Future  -     CBC and Platelet; Future            Assessment and plan:       1.  Symptomatic hypogonadism  - fatigue  - increased apathy  - difficult with weight loss, despite exercise    #2 erectile dysfunction  -doing well with Cialis as needed    2.  Prostate cancer screening  -PSA 0.5     . He has been educated on the risks and benefits of testosterone. The risks include erythrocytosis, worsening of BPH or an existing prostate cancer, worsening of untreated sleep apnea, acne, oily skin, gynecomastia, decrease in testicular size and the risk of decreased spermatogenesis. He does not currently desire fertility.    Testosterone cypionate 50 mg twice per week prescribed.    Repeat testosterone to obtain as a second baseline prior to starting therapy.  He will return for nurse visit to be taught how to drawl up and administer the medication intramuscularly.  Syringes were prescribed as well    He will then follow-up with advanced practitioner team in 3 months time for a postinjection testosterone and CBC    Lew Cho MD      Chief Complaint     Testosterone evaluation      History of Present Illness     Emil Jacobs is a 48 y.o. " "consultation for symptomatic hypogonadism    Detailed Urologic History     - please refer to HPI    Review of Systems     Review of Systems   Constitutional: Negative for activity change and fatigue.   HENT: Negative for congestion.    Eyes: Negative for visual disturbance.   Respiratory: Negative for shortness of breath and wheezing.    Cardiovascular: Negative for chest pain and leg swelling.   Gastrointestinal: Negative for abdominal pain.   Endocrine: Negative for polyuria.   Genitourinary: Negative for dysuria, flank pain, hematuria and urgency.   Musculoskeletal: Negative for back pain.   Allergic/Immunologic: Negative for immunocompromised state.   Neurological: Negative for dizziness and numbness.   Psychiatric/Behavioral: Negative for dysphoric mood.   All other systems reviewed and are negative.          Allergies     Allergies   Allergen Reactions    Doxycycline Itching    Tetracycline Rash     Severe itching       Physical Exam       Physical Exam  Constitutional:       General: He is not in acute distress.     Appearance: He is well-developed.   HENT:      Head: Normocephalic and atraumatic.   Cardiovascular:      Comments: Negative lower extremity edema  Pulmonary:      Effort: Pulmonary effort is normal.      Breath sounds: Normal breath sounds.   Abdominal:      Palpations: Abdomen is soft.   Musculoskeletal:         General: Normal range of motion.      Cervical back: Normal range of motion.   Skin:     General: Skin is warm.   Neurological:      Mental Status: He is alert and oriented to person, place, and time.   Psychiatric:         Behavior: Behavior normal.           Vital Signs  Vitals:    04/17/24 1440   BP: 142/84   BP Location: Left arm   Patient Position: Sitting   Cuff Size: Adult   Pulse: 60   SpO2: 99%   Weight: 127 kg (280 lb 3.2 oz)   Height: 5' 11\" (1.803 m)         Current Medications       Current Outpatient Medications:     amphetamine-dextroamphetamine (ADDERALL XR, 20MG,) 20 MG " 24 hr capsule, Take 1 capsule (20 mg total) by mouth every morning Max Daily Amount: 20 mg, Disp: 30 capsule, Rfl: 0    tadalafil (CIALIS) 20 MG tablet, Take 1 tablet (20 mg total) by mouth daily as needed for erectile dysfunction, Disp: 30 tablet, Rfl: 5    valsartan (DIOVAN) 80 mg tablet, Take 80 mg by mouth every morning, Disp: , Rfl:     meloxicam (MOBIC) 15 mg tablet, TAKE 1 TABLET (15 MG TOTAL) BY MOUTH DAILY. (Patient not taking: Reported on 4/17/2024), Disp: 30 tablet, Rfl: 0      Active Problems     Patient Active Problem List   Diagnosis    Sleep apnea    GERD (gastroesophageal reflux disease)    CPAP (continuous positive airway pressure) dependence    Chronic pain disorder    Benign essential hypertension    ADHD, predominantly inattentive type    Primary osteoarthritis of both knees    Mixed hyperlipidemia    Abnormal glucose    Low testosterone in male    Decreased sex drive    Major depressive disorder, recurrent, mild (HCC)    Generalized anxiety disorder         Past Medical History     Past Medical History:   Diagnosis Date    Chronic pain disorder     lumbar     CPAP (continuous positive airway pressure) dependence     GERD (gastroesophageal reflux disease)     diet controlled    Helicobacter pylori gastritis 4/18/2017    Sleep apnea     does not wear CPAP         Surgical History     Past Surgical History:   Procedure Laterality Date    COLONOSCOPY N/A 4/7/2017    Procedure: COLONOSCOPY;  Surgeon: Lacey Dennis MD;  Location: [facility];  Service:     ESOPHAGOGASTRODUODENOSCOPY N/A 4/7/2017    Procedure: ESOPHAGOGASTRODUODENOSCOPY (EGD);  Surgeon: Lacey Dennis MD;  Location: [facility];  Service:     FL INJECTION RIGHT SHOULDER (ARTHROGRAM)  4/1/2024    KNEE ARTHROSCOPY Left     x4 and ACL repair    KNEE ARTHROSCOPY Right     x2 and ACL repair    KNEE ARTHROSCOPY W/ AUTOGENOUS CARTILAGE IMPLANTATION (ACI) PROCEDURE Left     SINUS SURGERY      TONSILLECTOMY      UPPER GASTROINTESTINAL  ENDOSCOPY           Family History     Family History   Problem Relation Age of Onset    Breast cancer Mother     No Known Problems Father     Cancer Sister         breast    Breast cancer Sister     Liver cancer Maternal Grandmother     Colon cancer Paternal Grandmother     Mental illness Neg Hx     Diabetes Neg Hx          Social History     Social History     Social History     Tobacco Use   Smoking Status Former    Current packs/day: 0.00    Average packs/day: 0.5 packs/day for 5.0 years (2.5 ttl pk-yrs)    Types: Cigarettes    Start date:     Quit date:     Years since quittin.3   Smokeless Tobacco Never         Pertinent Lab Values     Lab Results   Component Value Date    CREATININE 0.98 2023       Lab Results   Component Value Date    PSA 0.5 2023       @RESULTRCNT(1H])@      Pertinent Imaging       MRI arthrogram right shoulder    Result Date: 2024  Narrative: MRI ARTHROGRAM RIGHT SHOULDER INDICATION:   M75.21: Bicipital tendinitis, right shoulder M25.511: Pain in right shoulder G89.29: Other chronic pain S43.431D: Superior glenoid labrum lesion of right shoulder, subsequent encounter. COMPARISON: 1/10/2024 TECHNIQUE:  Multiplanar/multisequence MR of the right shoulder was performed. Scan was performed after intraarticular injection of dilute gadolinium under fluoroscopic guidance into the joint. FINDINGS: SUBCUTANEOUS TISSUES: Normal JOINT CAPSULE: Intact, without inferior extravasation of contrast. SUBACROMIAL/SUBDELTOID BURSA: Normal. ACROMION PROCESS: There is a small subacromial spur. ROTATOR CUFF: Mild infraspinatus insertional tendinosis with low-grade articular and interstitial tearing, extending from the insertion to the critical zone, with a small amount of interstitial fluid which extends to the infraspinatus muscle belly. LONG HEAD OF BICEPS TENDON: Normal. GLENOID LABRUM: Intact. GLENOHUMERAL JOINT: Intact. ACROMIOCLAVICULAR JOINT: There is moderate osteoarthritis.  "BONES: No acute fracture or marrow infiltrative process     Impression: Mild infraspinatus insertional tendinosis with low-grade articular and interstitial tearing. No high-grade rotator cuff tendon tears. Moderate acromioclavicular osteoarthritis Workstation performed: [id number]     FL injection right shoulder (arthrogram)    Result Date: 4/1/2024  Narrative: RIGHT SHOULDER ARTHROGRAM INDICATION:   M75.21: Bicipital tendinitis, right shoulder M25.511: Pain in right shoulder G89.29: Other chronic pain S43.431D: Superior glenoid labrum lesion of right shoulder, subsequent encounter. COMPARISON: 1/10/2024 IMAGES:  3 FLUOROSCOPY TIME:   18 SEC FINDINGS: After the risks and benefits of the procedure were thoroughly explained, informed consent was obtained. The patient verbalized expressed understanding of the above risks and wished to proceed with the procedure. Final standard \"time-out\" procedure performed. The patient was prepped and draped in the usual sterile fashion. The patient expressed understanding of the above.   3 mL of 1% lidocaine solution was utilized for local anesthesia.  Intermittent fluoroscopy was utilized for placement of a 20 gauge 3.5 inch spinal needle within the right glenohumeral joint. 15 mL of a contrast cocktail consisting of 1/2 Omnipaque 240 mixed with 1/2 of 0.2 ml Gadavist/50ml Normal Saline was injected into the joint. The patient tolerated the procedure well.  There were no complications. I asked the patient to call us with any questions, concerns, or acute problems. The patient will report for MR imaging of the right shoulder.     Impression: Successful shoulder arthrogram with gadolinium injection into the right glenohumeral joint.  MRI of the shoulder is currently pending. Workstation performed: [id number]         Portions of the record may have been created with voice recognition software.  Occasional wrong word or \"sound a like\" substitutions may have occurred due to the " inherent limitations of voice recognition software.  In addition some of the content generated from this outpatient encounter includes information designed for patient education and/or communication back to the referring provider.  Read the chart carefully and recognize, using context, where substitutions have occurred.

## 2024-04-23 ENCOUNTER — SOCIAL WORK (OUTPATIENT)
Dept: BEHAVIORAL/MENTAL HEALTH CLINIC | Facility: CLINIC | Age: 49
End: 2024-04-23

## 2024-04-23 DIAGNOSIS — F41.1 GENERALIZED ANXIETY DISORDER: ICD-10-CM

## 2024-04-23 DIAGNOSIS — F33.0 MAJOR DEPRESSIVE DISORDER, RECURRENT, MILD (HCC): Primary | ICD-10-CM

## 2024-04-23 NOTE — PSYCH
Behavioral Health Psychotherapy Progress Note    Psychotherapy Provided: Individual Psychotherapy     1. Major depressive disorder, recurrent, mild (HCC)        2. Generalized anxiety disorder            Goals addressed in session: Goal 1     DATA: Emil was in person for session.  Discussed weeks events and his plan to retire in July.  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  Continue discussion of family dynamics and how he communicates with his spouse.  Shared some of their interactions and noted that at times he takes on an authoritative tone and seems to cut off her ideas.  Also noted that he is responding less to her negative comments of him and tends to walk away.  Explored how he responds to her and how he can alter his tone to get better results.  Feeling that he is reducing the amount of negative interactions but is willing to look at his side of the equation more this week.  During this session, this clinician used the following therapeutic modalities: Client-centered Therapy, Cognitive Behavioral Therapy, and Solution-Focused Therapy    Substance Abuse was not addressed during this session. If the client is diagnosed with a co-occurring substance use disorder, please indicate any changes in the frequency or amount of use: No change. Stage of change for addressing substance use diagnoses: Contemplation    ASSESSMENT:  Emil Jacobs presents with a Euthymic/ normal, Anxious, and irritable  mood.     his affect is Normal range and intensity, which is congruent, with his mood and the content of the session. The client has made progress on their goals.    Able to react less to wife's negative comments and is working to reduce his negative responses.  Willing to explore how he reacts in response and interactions.  Emil Jacobs presents with a none risk of suicide, none risk of self-harm, and none risk of harm to  "others.    For any risk assessment that surpasses a \"low\" rating, a safety plan must be developed.    A safety plan was indicated: no  If yes, describe in detail not applicable    PLAN: Between sessions, Emil Jacobs will continue to observe and explore how he responds and family relationships. At the next session, the therapist will use Client-centered Therapy and Cognitive Behavioral Therapy to address mood regulation.    Behavioral Health Treatment Plan and Discharge Planning: Emil Jacobs is aware of and agrees to continue to work on their treatment plan. They have identified and are working toward their discharge goals. yes    Visit start and stop times:    04/23/24  Start Time: 1000  Stop Time: 1045  Total Visit Time: 45 minutes  "

## 2024-04-29 ENCOUNTER — TELEPHONE (OUTPATIENT)
Age: 49
End: 2024-04-29

## 2024-04-29 NOTE — TELEPHONE ENCOUNTER
Up to patient. If wife comfortable giving injection, then I would just briefly discuss location on phone. Thanks

## 2024-04-29 NOTE — TELEPHONE ENCOUNTER
Pt called, sts that his wife is an RN and she will be giving the pt his testosterone injections.  Pt is requesting a call back to discuss-He is questioning if there is a certain area or injection site that should be used when giving the injection.  Please review and advise.    Pt call back: [phone]

## 2024-04-30 ENCOUNTER — SOCIAL WORK (OUTPATIENT)
Dept: BEHAVIORAL/MENTAL HEALTH CLINIC | Facility: CLINIC | Age: 49
End: 2024-04-30

## 2024-04-30 DIAGNOSIS — F41.1 GENERALIZED ANXIETY DISORDER: ICD-10-CM

## 2024-04-30 DIAGNOSIS — F33.0 MAJOR DEPRESSIVE DISORDER, RECURRENT, MILD (HCC): Primary | ICD-10-CM

## 2024-04-30 PROCEDURE — 87205 SMEAR GRAM STAIN: CPT | Performed by: OTOLARYNGOLOGY

## 2024-04-30 PROCEDURE — 87070 CULTURE OTHR SPECIMN AEROBIC: CPT | Performed by: OTOLARYNGOLOGY

## 2024-04-30 NOTE — TELEPHONE ENCOUNTER
Patient returned call to office and would like to know what location he should have his wife inject. He heard of three locations and would like verify.        Please advise     CB#[phone]

## 2024-04-30 NOTE — PSYCH
Behavioral Health Psychotherapy Progress Note    Psychotherapy Provided: Individual Psychotherapy     1. Major depressive disorder, recurrent, mild (HCC)        2. Generalized anxiety disorder            Goals addressed in session: Goal 1     DATA: Emil was in person for session.  Discussed weeks events and processed situations regarding family communication.  Shared several examples of the way that he related to his wife and processed whether he thought these were effective or less so.  Observed that there are times that his response to her is very abrupt and flat no rather than a discussion or reasons why he thinks the way he does.  Continues to identify how she disparages him and sometimes others in order to elevate herself.  Also discussed his past relationships and how he related to others before his wife.  Offered options to reduce his own edginess or anger and some of his communication with her and how to use I statements rather than use statements when addressing issues.  During this session, this clinician used the following therapeutic modalities: Client-centered Therapy, Cognitive Behavioral Therapy, and Solution-Focused Therapy    Substance Abuse was not addressed during this session. If the client is diagnosed with a co-occurring substance use disorder, please indicate any changes in the frequency or amount of use: Not applicable. Stage of change for addressing substance use diagnoses: Contemplation    ASSESSMENT:  Emil Jacobs presents with a Euthymic/ normal mood.     his affect is Normal range and intensity, which is congruent, with his mood and the content of the session. The client has made progress on their goals.    Continues to identify triggers or situations at home and is willing to examine his part of the equation and try to make corrections.  Identifies that he is responding less often to negative commentary and is willing to work on improving his own style of communication if necessary.   "Beginning to discern the difference between something he thinks is \"absolutely right\" versus something that bears discussion.  Emil Jacobs presents with a none risk of suicide, none risk of self-harm, and none risk of harm to others.    For any risk assessment that surpasses a \"low\" rating, a safety plan must be developed.    A safety plan was indicated: no  If yes, describe in detail not applicable    PLAN: Between sessions, Emil Jacobs will continue to observe and identify communication patterns that lead to less family conflict.. At the next session, the therapist will use Client-centered Therapy, Cognitive Behavioral Therapy, Solution-Focused Therapy, and Supportive Psychotherapy to address anxiety management and mood regulation.    Behavioral Health Treatment Plan and Discharge Planning: Emil Jacobs is aware of and agrees to continue to work on their treatment plan. They have identified and are working toward their discharge goals. yes    Visit start and stop times:    04/30/24  Start Time: 1011  Stop Time: 1056  Total Visit Time: 45 minutes  "

## 2024-04-30 NOTE — TELEPHONE ENCOUNTER
Called patient and advised he may inject testosterone of either side glute muscle or thigh.    Patient verbalized understanding.

## 2024-05-07 ENCOUNTER — SOCIAL WORK (OUTPATIENT)
Dept: BEHAVIORAL/MENTAL HEALTH CLINIC | Facility: CLINIC | Age: 49
End: 2024-05-07
Payer: COMMERCIAL

## 2024-05-07 DIAGNOSIS — F41.1 GENERALIZED ANXIETY DISORDER: ICD-10-CM

## 2024-05-07 DIAGNOSIS — F33.0 MAJOR DEPRESSIVE DISORDER, RECURRENT, MILD (HCC): Primary | ICD-10-CM

## 2024-05-07 DIAGNOSIS — F90.0 ADHD, PREDOMINANTLY INATTENTIVE TYPE: ICD-10-CM

## 2024-05-07 PROCEDURE — 90834 PSYTX W PT 45 MINUTES: CPT | Performed by: COUNSELOR

## 2024-05-07 NOTE — PSYCH
Behavioral Health Psychotherapy Progress Note    Psychotherapy Provided: Individual Psychotherapy     1. Major depressive disorder, recurrent, mild (HCC)        2. Generalized anxiety disorder        3. ADHD, predominantly inattentive type            Goals addressed in session: Goal 1     DATA: Emil was in person for session.  Noted that it was a bad week and that he was very frustrated.  Attempted to deflect conversation and tried to push aside but we focused on identifying what was creating the mood and attitude that he had.  Continue to process conversations with his ex-wife this week and ways that she continues to dismiss and demeaning him.  Also shared information from the past about the man with whom she takes pictures and tries to flirt in order to make him jealous or to make herself feel good.  States that he knows cognitively that nothing is going on with them but yet feels upset and unhappy at her behavior.  When she tries to confront or address these concerns she treats him as if he is crazy.  Continue to process his emotions and encourage him to allow himself to feel his feelings and address his experience without censorship or filtering.  Also working to allow him to feel his frustration and anger rather than pushing it away or stuffing it.   During this session, this clinician used the following therapeutic modalities: Client-centered Therapy, Cognitive Behavioral Therapy, and Cognitive Processing Therapy    Substance Abuse was not addressed during this session. If the client is diagnosed with a co-occurring substance use disorder, please indicate any changes in the frequency or amount of use: Mentioned some alcohol use in the past but is not at the forefront of the story did admit that his son was caught with alcohol this week and was being punished for his behavior. Stage of change for addressing substance use diagnoses: Pre-contemplation    ASSESSMENT:  Emil Jacobs presents with a Angry, Anxious,  "and Depressed mood.     his affect is Normal range and intensity, which is congruent, with his mood and the content of the session. The client has made progress on their goals.    Was able to allow the process and discuss his hurt feelings and anger.  Continues to \"protect\" his wife and honor her status in his life.  Encouraging him to allow sessions be a place where he can feel safe enough to tell his own truth with the understanding that he does still love his wife.  Emil Jacobs presents with a none risk of suicide, none risk of self-harm, and none risk of harm to others.    For any risk assessment that surpasses a \"low\" rating, a safety plan must be developed.    A safety plan was indicated: no  If yes, describe in detail not applicable    PLAN: Between sessions, Emil Jacobs will continue to work on processing his own feelings and becoming more honest about how he experiences his relationship. At the next session, the therapist will use Client-centered Therapy and Cognitive Behavioral Therapy to address anxiety management and mood regulation.    Behavioral Health Treatment Plan and Discharge Planning: Emil Jacobs is aware of and agrees to continue to work on their treatment plan. They have identified and are working toward their discharge goals. yes    Visit start and stop times:    05/07/24  Start Time: 1031  Stop Time: 1116  Total Visit Time: 45 minutes  "

## 2024-05-14 ENCOUNTER — SOCIAL WORK (OUTPATIENT)
Dept: BEHAVIORAL/MENTAL HEALTH CLINIC | Facility: CLINIC | Age: 49
End: 2024-05-14

## 2024-05-14 DIAGNOSIS — F90.0 ADHD, PREDOMINANTLY INATTENTIVE TYPE: ICD-10-CM

## 2024-05-14 DIAGNOSIS — F33.0 MAJOR DEPRESSIVE DISORDER, RECURRENT, MILD (HCC): Primary | ICD-10-CM

## 2024-05-14 RX ORDER — DEXTROAMPHETAMINE SACCHARATE, AMPHETAMINE ASPARTATE MONOHYDRATE, DEXTROAMPHETAMINE SULFATE AND AMPHETAMINE SULFATE 5; 5; 5; 5 MG/1; MG/1; MG/1; MG/1
20 CAPSULE, EXTENDED RELEASE ORAL EVERY MORNING
Qty: 30 CAPSULE | Refills: 0 | Status: SHIPPED | OUTPATIENT
Start: 2024-05-14

## 2024-05-14 NOTE — PSYCH
"Behavioral Health Psychotherapy Progress Note    Psychotherapy Provided: Individual Psychotherapy     1. Major depressive disorder, recurrent, mild (HCC)        2. ADHD, predominantly inattentive type            Goals addressed in session: Goal 1     DATA: Emil was in person for session.  Stated that his week was calm.  Questioned him about how he is communicating and what has transpired over the week.  Noted that it was a wash.  Prompted him to discuss more specifics and he disclosed some of his communication style compared to that of his wife.  Discussed how sometimes his presentation with her tends to be abrupt and direct almost as if \"this is the way it is and I am not changing my mind\".  He addressed continued feelings of frustration but noted that he is trying to work toward reducing his own negative feelings as well as responding in a calmer manner.  Feels that this is a work in progress.  During this session, this clinician used the following therapeutic modalities: Client-centered Therapy, Cognitive Behavioral Therapy, and Solution-Focused Therapy    Substance Abuse was not addressed during this session. If the client is diagnosed with a co-occurring substance use disorder, please indicate any changes in the frequency or amount of use: No change. Stage of change for addressing substance use diagnoses: Pre-contemplation    ASSESSMENT:  Emil Jacobs presents with a Euthymic/ normal mood.     his affect is Normal range and intensity, which is congruent, with his mood and the content of the session. The client has made progress on their goals.    Is aware of how his anger and frustration is seeping into some of his communication style but is also very guarded about opening himself up to hurt and more insults.  Emil Jacobs presents with a none risk of suicide, none risk of self-harm, and none risk of harm to others.    For any risk assessment that surpasses a \"low\" rating, a safety plan must be " developed.    A safety plan was indicated: no  If yes, describe in detail not applicable    PLAN: Between sessions, Emil Jacobs will continue to explore communication patterns and conflicts with his spouse. At the next session, the therapist will use Client-centered Therapy, Cognitive Behavioral Therapy, and Solution-Focused Therapy to address anxiety management and mood regulation.    Behavioral Health Treatment Plan and Discharge Planning: Emil Jacobs is aware of and agrees to continue to work on their treatment plan. They have identified and are working toward their discharge goals. yes    Visit start and stop times:    05/14/24  Start Time: 1006  Stop Time: 1051  Total Visit Time: 45 minutes

## 2024-05-28 ENCOUNTER — SOCIAL WORK (OUTPATIENT)
Dept: BEHAVIORAL/MENTAL HEALTH CLINIC | Facility: CLINIC | Age: 49
End: 2024-05-28
Payer: COMMERCIAL

## 2024-05-28 DIAGNOSIS — F90.0 ADHD, PREDOMINANTLY INATTENTIVE TYPE: ICD-10-CM

## 2024-05-28 DIAGNOSIS — F33.0 MAJOR DEPRESSIVE DISORDER, RECURRENT, MILD (HCC): Primary | ICD-10-CM

## 2024-05-28 DIAGNOSIS — F41.1 GENERALIZED ANXIETY DISORDER: ICD-10-CM

## 2024-05-28 PROCEDURE — 90834 PSYTX W PT 45 MINUTES: CPT | Performed by: COUNSELOR

## 2024-05-28 NOTE — PSYCH
"Behavioral Health Psychotherapy Progress Note    Psychotherapy Provided: Individual Psychotherapy     1. Major depressive disorder, recurrent, mild (HCC)        2. Generalized anxiety disorder        3. ADHD, predominantly inattentive type            Goals addressed in session: Goal 1     DATA: Emil was in person for session.  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  Continued to discuss family matters and noted a situation in which he asked his wife to do something and she delayed until her father stepped up and still asked her to do so and get involved.  Continue to process how he communicates and his expectations from his wife.  Noted that she did not do anything special for his birthday which was a few weeks ago and also was very last minute about his daughter's birthday, but states she expects everything she wants and makes a birthday \"weekend\" for herself.  Continue to process how he needs to look at his own communication and his feelings and not just avoid communication but begin to express what he needs and what he feels when appropriate.  During this session, this clinician used the following therapeutic modalities: Client-centered Therapy, Cognitive Behavioral Therapy, and Solution-Focused Therapy    Substance Abuse was not addressed during this session. If the client is diagnosed with a co-occurring substance use disorder, please indicate any changes in the frequency or amount of use: Not applicable. Stage of change for addressing substance use diagnoses: No substance use/Not applicable    ASSESSMENT:  Emil Jacobs presents with a Euthymic/ normal mood.     his affect is Normal range and intensity, which is congruent, with his mood and the content of the session. The client has made progress on their goals.    Is able to now see when a situation is not appropriate to argue but is still not " "comfortable expressing deeper level feelings with his spouse.  Emil Jacobs presents with a none risk of suicide, none risk of self-harm, and none risk of harm to others.    For any risk assessment that surpasses a \"low\" rating, a safety plan must be developed.    A safety plan was indicated: no  If yes, describe in detail not applicable    PLAN: Between sessions, Emil Jacobs will continue to explore and identify opportunities to be more direct and also more vulnerable with his communication with his wife. At the next session, the therapist will use Client-centered Therapy, Cognitive Behavioral Therapy, Solution-Focused Therapy, and Supportive Psychotherapy to address anxiety management and mood regulation.    Behavioral Health Treatment Plan and Discharge Planning: Emil Jacobs is aware of and agrees to continue to work on their treatment plan. They have identified and are working toward their discharge goals. yes    Visit start and stop times:    05/28/24  Start Time: 1010  Stop Time: 1055  Total Visit Time: 45 minutes  "

## 2024-06-04 ENCOUNTER — SOCIAL WORK (OUTPATIENT)
Dept: BEHAVIORAL/MENTAL HEALTH CLINIC | Facility: CLINIC | Age: 49
End: 2024-06-04
Payer: COMMERCIAL

## 2024-06-04 DIAGNOSIS — F33.0 MAJOR DEPRESSIVE DISORDER, RECURRENT, MILD (HCC): Primary | ICD-10-CM

## 2024-06-04 DIAGNOSIS — F41.1 GENERALIZED ANXIETY DISORDER: ICD-10-CM

## 2024-06-04 DIAGNOSIS — F90.0 ADHD, PREDOMINANTLY INATTENTIVE TYPE: ICD-10-CM

## 2024-06-04 PROCEDURE — 90834 PSYTX W PT 45 MINUTES: CPT | Performed by: COUNSELOR

## 2024-06-05 NOTE — PSYCH
Behavioral Health Psychotherapy Progress Note    Psychotherapy Provided: Individual Psychotherapy     1. Major depressive disorder, recurrent, mild (HCC)        2. ADHD, predominantly inattentive type        3. Generalized anxiety disorder            Goals addressed in session: Goal 1     DATA: Emil was in person for session.  Noted that the week was better and that he feels his communication is improving at home shared some instances where he got involved in some situations between his wife and the children most notably his daughter.  Discussed how he jumped in and in the middle of an argument they were having and supported his wife.  Asked him to consider the actual contact and of what was being stated and he noted he does not tolerate disrespect from the children.  Again reiterated that his daughter made inappropriate comment to her mother and did not say it disrespectfully but was starting to establish a boundary with her mother regarding the way she was spoken to.  Upon her further reflection he did note that he tends to jump first and think later.  Reviewed how he can learn when to respond and when to act out or just observed in certain family arguments.  Also discussed how his need to live and protect his wife's nonverbal but at times may not allow her to have the full consequences of some of her negative actions.  Agreed to pay attention to context cues and will observe interactions more.  Also noted that the children do not speak to him that way and his style of managing them is completely different from his wife.  Did note that he continues to work on reacting less to her negative comments regarding him and as a result feels that they are diminishing to some extent.  During this session, this clinician used the following therapeutic modalities: Client-centered Therapy and Cognitive Behavioral Therapy    Substance Abuse was not addressed during this session. If the client is diagnosed with a co-occurring  "substance use disorder, please indicate any changes in the frequency or amount of use: Not addressed. Stage of change for addressing substance use diagnoses: Pre-contemplation    ASSESSMENT:  Emil Jacobs presents with a Euthymic/ normal and Anxious mood.     his affect is Normal range and intensity, which is congruent, with his mood and the content of the session. The client has made progress on their goals.    Appeared reactive and defensive at first when challenged about sticking up for his wife but was able to stop and listen and then reflects on how he can be impulsive.  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  Acknowledged how sessions have helped him and how he is learning and taking what we discussed to heart and trying to implement them between sessions.  Emil Jacobs presents with a none risk of suicide, none risk of self-harm, and none risk of harm to others.    For any risk assessment that surpasses a \"low\" rating, a safety plan must be developed.    A safety plan was indicated: no  If yes, describe in detail not applicable    PLAN: Between sessions, Emil Jacobs will continue to observe his responses and communication style with family members.. At the next session, the therapist will use Client-centered Therapy, Cognitive Behavioral Therapy, and Mindfulness-based Strategies to address mood regulation.    Behavioral Health Treatment Plan and Discharge Planning: Emil Jacobs is aware of and agrees to continue to work on their treatment plan. They have identified and are working toward their discharge goals. yes    Visit start and stop times:    06/04/24  Start Time: 1000  Stop Time: 1100  Total Visit Time: 50 minutes  "

## 2024-06-11 ENCOUNTER — SOCIAL WORK (OUTPATIENT)
Dept: BEHAVIORAL/MENTAL HEALTH CLINIC | Facility: CLINIC | Age: 49
End: 2024-06-11
Payer: COMMERCIAL

## 2024-06-11 DIAGNOSIS — F33.0 MAJOR DEPRESSIVE DISORDER, RECURRENT, MILD (HCC): Primary | ICD-10-CM

## 2024-06-11 DIAGNOSIS — F41.1 GENERALIZED ANXIETY DISORDER: ICD-10-CM

## 2024-06-11 DIAGNOSIS — F90.0 ADHD, PREDOMINANTLY INATTENTIVE TYPE: ICD-10-CM

## 2024-06-11 PROCEDURE — 90834 PSYTX W PT 45 MINUTES: CPT | Performed by: COUNSELOR

## 2024-06-11 NOTE — PSYCH
Behavioral Health Psychotherapy Progress Note    Psychotherapy Provided: Individual Psychotherapy     1. Major depressive disorder, recurrent, mild (HCC)        2. Generalized anxiety disorder        3. ADHD, predominantly inattentive type            Goals addressed in session: Goal 1     DATA: Emil was in person for session. 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 Noted that he is still working on communication at home and feels that he is doing somewhat better. Addressed his behavior of jumping in quickly and abruptly with answers and some rigidity about what he perceives as right or wrong. Noted that he especially does this with his wife and children. Noted how he likes a sense of control rather than collaboration. Offered options for alternatives to current pattern and how to ask questions or listen first. Also admitted that he drinks often - noted that we have mot addressed how his use of alcohol may contribute to some family conflicts. Denies that this may be a contributing factor.  During this session, this clinician used the following therapeutic modalities: Client-centered Therapy and Cognitive Behavioral Therapy    Substance Abuse was addressed during this session. If the client is diagnosed with a co-occurring substance use disorder, please indicate any changes in the frequency or amount of use: Admitted to using alcohol regularly. Stage of change for addressing substance use diagnoses: Pre-contemplation    ASSESSMENT:  Emil Jacobs presents with a Euthymic/ normal mood.     his affect is Normal range and intensity, which is congruent, with his mood and the content of the session. The client has made progress on their goals.    He is open to exploring his communication patterns and while at times he may feel avoidance is always willing to reconsider and look at possibilities for change Emil Jacobs presents with a none risk of suicide,  "none risk of self-harm, and none risk of harm to others.    For any risk assessment that surpasses a \"low\" rating, a safety plan must be developed.    A safety plan was indicated: no  If yes, describe in detail not applicable    PLAN: Between sessions, Emil Jacobs will continue to explore communication patterns with wife and children. At the next session, the therapist will use Client-centered Therapy and Cognitive Behavioral Therapy to address mood regulation.    Behavioral Health Treatment Plan and Discharge Planning: Emil Jacobs is aware of and agrees to continue to work on their treatment plan. They have identified and are working toward their discharge goals. yes    Visit start and stop times:    06/11/24  Start Time: 1507  Stop Time: 1552  Total Visit Time: 45 minutes  "

## 2024-06-27 DIAGNOSIS — F90.0 ADHD, PREDOMINANTLY INATTENTIVE TYPE: ICD-10-CM

## 2024-06-27 RX ORDER — DEXTROAMPHETAMINE SACCHARATE, AMPHETAMINE ASPARTATE MONOHYDRATE, DEXTROAMPHETAMINE SULFATE AND AMPHETAMINE SULFATE 5; 5; 5; 5 MG/1; MG/1; MG/1; MG/1
20 CAPSULE, EXTENDED RELEASE ORAL EVERY MORNING
Qty: 30 CAPSULE | Refills: 0 | Status: SHIPPED | OUTPATIENT
Start: 2024-06-27

## 2024-07-03 ENCOUNTER — OFFICE VISIT (OUTPATIENT)
Dept: OBGYN CLINIC | Facility: CLINIC | Age: 49
End: 2024-07-03
Payer: COMMERCIAL

## 2024-07-03 VITALS
WEIGHT: 280 LBS | HEIGHT: 71 IN | HEART RATE: 83 BPM | DIASTOLIC BLOOD PRESSURE: 84 MMHG | SYSTOLIC BLOOD PRESSURE: 142 MMHG | BODY MASS INDEX: 39.2 KG/M2

## 2024-07-03 DIAGNOSIS — M76.61 TENDONITIS, ACHILLES, RIGHT: Primary | ICD-10-CM

## 2024-07-03 DIAGNOSIS — E66.9 OBESITY (BMI 35.0-39.9 WITHOUT COMORBIDITY): ICD-10-CM

## 2024-07-03 PROCEDURE — 99214 OFFICE O/P EST MOD 30 MIN: CPT | Performed by: ORTHOPAEDIC SURGERY

## 2024-07-03 NOTE — PROGRESS NOTES
Assessment/Plan:  1. Tendonitis, Achilles, right  MRI ankle/heel right  wo contrast    Heel Cup      2. Obesity (BMI 35.0-39.9 without comorbidity)          The patient has significant worsening of his chronic achilles tendonitis, and at this point I do have concern for possible partial tear. I am ordering an MRI of the ankle to assess for this. In the meantime, he can take OTC anti-inflammatories and use topical voltaren. He will FU after the MRI to discuss the results and how to proceed.  At his next visit we will also plan to obtain knee and hip radiographs as these have been bothering him as well.    Subjective:   Emil Jacobs is a 49 y.o. male who presents today for follow-up of his right ankle. I had previously seen him for achilles tendonitis, which began this past August. He switched some shoe wear prior to the onset of his symptoms, however he denies any significant injury. He just got back from a couple week long trip to [place] and did a lot of walking on this trip. Since then his symptoms have gotten significantly worse. He notes severe pain and swelling about the posterior ankle. His pain is worse with really any activity. He notes good sensation of the right lower extremity.      Review of Systems   Constitutional: Negative.  Negative for chills and fever.   HENT: Negative.  Negative for ear pain and sore throat.    Eyes: Negative.  Negative for pain and redness.   Respiratory: Negative.  Negative for shortness of breath and wheezing.    Cardiovascular:  Negative for chest pain and palpitations.   Gastrointestinal: Negative.  Negative for abdominal pain and blood in stool.   Endocrine: Negative.  Negative for polydipsia and polyuria.   Genitourinary: Negative.  Negative for difficulty urinating and dysuria.   Musculoskeletal:         As noted in HPI   Skin: Negative.  Negative for pallor and rash.   Neurological: Negative.  Negative for dizziness and numbness.   Hematological: Negative.  Negative for  adenopathy. Does not bruise/bleed easily.   Psychiatric/Behavioral: Negative.  Negative for confusion and suicidal ideas.          Past Medical History:   Diagnosis Date   • Chronic pain disorder     lumbar    • CPAP (continuous positive airway pressure) dependence    • GERD (gastroesophageal reflux disease)     diet controlled   • Helicobacter pylori gastritis 2017   • Sleep apnea     does not wear CPAP       Past Surgical History:   Procedure Laterality Date   • COLONOSCOPY N/A 2017    Procedure: COLONOSCOPY;  Surgeon: Lacey Dennis MD;  Location: [facility];  Service:    • ESOPHAGOGASTRODUODENOSCOPY N/A 2017    Procedure: ESOPHAGOGASTRODUODENOSCOPY (EGD);  Surgeon: Lacey Dennis MD;  Location: [facility];  Service:    • FL INJECTION RIGHT SHOULDER (ARTHROGRAM)  2024   • KNEE ARTHROSCOPY Left     x4 and ACL repair   • KNEE ARTHROSCOPY Right     x2 and ACL repair   • KNEE ARTHROSCOPY W/ AUTOGENOUS CARTILAGE IMPLANTATION (ACI) PROCEDURE Left    • SINUS SURGERY     • TONSILLECTOMY     • UPPER GASTROINTESTINAL ENDOSCOPY         Family History   Problem Relation Age of Onset   • Breast cancer Mother    • No Known Problems Father    • Cancer Sister         breast   • Breast cancer Sister    • Liver cancer Maternal Grandmother    • Colon cancer Paternal Grandmother    • Mental illness Neg Hx    • Diabetes Neg Hx        Social History     Occupational History   • Not on file   Tobacco Use   • Smoking status: Former     Current packs/day: 0.00     Average packs/day: 0.5 packs/day for 5.0 years (2.5 ttl pk-yrs)     Types: Cigarettes     Start date:      Quit date: 2012     Years since quittin.5   • Smokeless tobacco: Never   Vaping Use   • Vaping status: Never Used   Substance and Sexual Activity   • Alcohol use: Yes     Comment: daily   • Drug use: No   • Sexual activity: Not on file         Current Outpatient Medications:   •  amphetamine-dextroamphetamine (ADDERALL XR, 20MG,) 20 MG 24 hr  "capsule, Take 1 capsule (20 mg total) by mouth every morning Max Daily Amount: 20 mg, Disp: 30 capsule, Rfl: 0  •  tadalafil (CIALIS) 20 MG tablet, Take 1 tablet (20 mg total) by mouth daily as needed for erectile dysfunction, Disp: 30 tablet, Rfl: 5  •  testosterone cypionate (DEPO-TESTOSTERONE) 200 mg/mL SOLN, Inject 0.25 mL (50 mg total) into a muscle 2 (two) times a week, Disp: 10 mL, Rfl: 3  •  valsartan (DIOVAN) 80 mg tablet, Take 80 mg by mouth every morning, Disp: , Rfl:   •  ciprofloxacin-dexamethasone (CIPRODEX) otic suspension, Administer 4 drops into the left ear 2 (two) times a day for 10 days, Disp: 7.5 mL, Rfl: 0  •  meloxicam (MOBIC) 15 mg tablet, TAKE 1 TABLET (15 MG TOTAL) BY MOUTH DAILY. (Patient not taking: Reported on 4/17/2024), Disp: 30 tablet, Rfl: 0  •  ofloxacin (FLOXIN) 0.3 % otic solution, Use 4 gtts to left ear bid x 7 days prn drainage (Patient not taking: Reported on 7/3/2024), Disp: 5 mL, Rfl: 0  •  SYRINGE/NEEDLE, DISP, 1 ML 25G X 5/8\" 1 ML MISC, Use once a week (Patient not taking: Reported on 7/3/2024), Disp: 15 each, Rfl: 3    Allergies   Allergen Reactions   • Doxycycline Itching   • Tetracycline Rash     Severe itching       Objective:  Vitals:    07/03/24 0726   BP: 142/84   Pulse: 83     Pain Score:   7      Right Ankle Exam     Tenderness   Right ankle tenderness location: midsubstance achilles tendon.  Swelling: moderate    Range of Motion   The patient has normal right ankle ROM.  Right ankle dorsiflexion: with pain.     Muscle Strength   Plantar flexion:  5/5    Other   Erythema: absent  Sensation: normal  Pulse: present     Comments:  Negative Samson test.           Strength/Myotome Testing     Right Ankle/Foot   Plantar flexion: 5      Physical Exam  Constitutional:       General: He is not in acute distress.     Appearance: He is well-developed.   HENT:      Head: Normocephalic and atraumatic.   Eyes:      General: No scleral icterus.     Conjunctiva/sclera: " Conjunctivae normal.   Neck:      Vascular: No JVD.   Cardiovascular:      Rate and Rhythm: Normal rate.   Pulmonary:      Effort: Pulmonary effort is normal. No respiratory distress.   Musculoskeletal:      Comments: As per HPI   Skin:     General: Skin is warm.   Neurological:      Mental Status: He is alert and oriented to person, place, and time.      Coordination: Coordination normal.         I have personally reviewed pertinent films in PACS and my interpretation is as follows:  No new imaging today.      This document was created using speech voice recognition software.   Grammatical errors, random word insertions, pronoun errors, and incomplete sentences are an occasional consequence of this system due to software limitations, ambient noise, and hardware issues.   Any formal questions or concerns about content, text, or information contained within the body of this dictation should be directly addressed to the provider for clarification.

## 2024-07-03 NOTE — PATIENT INSTRUCTIONS
EXERCISES FOR SHOULDER REHABILITATION: INCREASED FLEXIBILITY AND STRENGTH     All of the exercises listed are to be done with slow and steady movements and controlled breathing. Do only what you feel comfortable doing.   1. CODMAN’S/ PENDULUM       1. Lean forward and place one hand on a counter or table for support. Let your other arm hang freely at your side.  2. Gently swing your arm forward and back. Repeat the exercise moving your arm side-to-side, and repeat again in a circular motion.  3. Keep from rounding your back or locking your knees.  4. Repeat 10 times.  5. Complete 2-3 sets.  2. CROSSOVER ARM STRETCH       1. Standing upright, relax your shoulders and pull one arm across your body as far as possible. Hold at your upper arm.  2. Hold the stretch for 15-30 seconds.  3. Repeat the stretch for your other arm.  4. Repeat the stretch 3 times for each arm.  3. ACTIVE ASSISTIVE ROM WITH STICK     1. Keep your affected arm relaxed, do not lift your affected arm on its own.  2. Move through the motions slowly.    Flexion:  1. Hold a stick with your hands shoulder width apart.  2. Slowly raise your arms up out in front of you. Relax your affected arm allowing your unaffected arm to lift your affected arm.  3. After holding for 3-5 seconds at the end range, slowly return back down.  4. Repeat 10 times.    Extension:   1. Hold a stick at your side with your affected arm at your side.  2. Slowly push your affected arm backwards behind you. Keep your body upright and your affected arm relaxed.  3. After holding for 3-5 seconds at the end range, slowly return back down.  4. Repeat 10 times.    Abduction:  1. Hold a stick with your hands shoulder width apart.  2. Slowly push your affected arm to the side of you. Completely relax your affected arm.  3. After holding for 3-5 seconds at the end range, slowly return back down.  4. Repeat 10 times.    Internal Rotation/Extension:  1. Hold a stick with your hands as close  as possible behind your body.  2. Slowly raise your affected arm up, bringing your affected arm up with it. Relax your affected arm as much as possible.  3. After holding for 3-5 seconds at the end range, slowly return back down.  4. Repeat 10 times.    Passive Internal Rotation:  1. Hold a stick with your hands shoulder width apart behind your back.  2. Slowly pull your affected arm behind your body. Completely relax your affected arm.  3. After holding for 3-5 seconds at the end range, slowly return back down.  4. Repeat 10 times.     Passive External Rotation:  1. Hold a stick with one hand and cup the other end of the stick with your other hand.  2. Slowly push your affected arm outward horizontally.  3. After holding for 3-5 seconds at the end range, slowly return back down.  4. Repeat 10 times.  4. TOWEL STRETCH INTERNAL ROTATION / EXTENSION       1. Hold a towel behind your back. Affected arm at the bottom.  2. Slowly elevate your affected arm by pulling up with your unaffected arm.  3. Hold for 20-30 seconds at the maximum pain free range, then relax for 30 seconds.  4. Repeat 3-6 times.  5. SLEEPER STRETCH       1. Lay on your side on a firm surface with your affected arm under you as shown. Flex your elbow to 90 degrees.  2. Slowly press down on your forearm with the opposite arm, stopping when you feel a stretch.  3. Hold for 20-30 seconds at the maximum pain free range, then relax for 30 seconds.  4. Repeat 3-6 times.  6. STANDING ROW       1. Attach a band to a doorknob or other steady surface. You may tie the ends of the band together to create a loop.  2. Stand upright with your arm at a 90 degree angle at your side.  3. Keeping your arm tucked at your side, slowly pull your elbow straight backwards.  4. Slowly return to the start position, repeat 8-12 times.  5. Complete 3 sets.  7. EXTERNAL ROTATION WITH ARM ABDUCTED 90°       1. Attach a band to a doorknob or other steady surface. You may tie the  ends of the band together to create a loop.  2. Stand upright with your arm at a 90 degree angle and at shoulder height.  3. Keeping your shoulder and elbow at an even level, slowly raise your hand until it is facing upwards, or even with your head.  4. Slowly return to the start position, repeat 8-12 times.  5. Complete 3 sets.  8. INTERNAL ROTATION WITH BAND       1. Attach a band to a doorknob or other steady surface. You may tie the ends of the band together to create a loop.  2. Stand perpendicular to the band with your arm at a 90 degree angle and tucked at your side.  3. Keeping your elbow tucked, slowly rotate your hand inward.  4. Slowly return to the start position, repeat 8-12 times.  5. Complete 3 sets.  9. EXTERNAL ROTATION WITH BAND       1. Attach a band to a doorknob or other steady surface. You may tie the ends of the band together to create a loop.  2. Stand perpendicular to the band with your arm at a 90 degree angle and tucked at your side.  3. Keeping your elbow tucked, slowly rotate your hand outward.  4. Slowly return to the start position, repeat 8-12 times.  5. Complete 3 sets.  10. ELBOW FLEXION (BICEP CURL)       1. Standing upright hold a dumbbell in each hand.  2. Keeping your elbow close to your side slowly raise the weight upwards toward your shoulder.  3. Avoid swinging your arm or using momentum.  4. Repeat for 8-12 repetitions.  5. Complete 3 sets.  11. ELBOW EXTENSION (OVERHEAD TRICEP EXTENSION)       1. Standing upright hold a dumbbell over your head. Support your arm by holding your opposite hand on your upper arm.  2. Slowly straighten your elbow and raise the weight overhead.  3. Repeat for 8-12 repetions.  4. Complete 3 sets.  12. STRAIGHT ARM DUMBBELL ROW       1. Place your knee or a chair or bench and lean forward so your that your hand supports your weight. Use a light weight (1-7lbs).  2. Slowly raise the weight behind you parallel to the floor, rotating your hand to a  thumbs-up position. Keep your arm straight.  3. Repeat 15-20 times.  4. Complete 3-4 sets.  13. SCAPULA SETTING       1. Lay on your stomach with your arms at your side. Palms facing downwards.  2. Slowly draw your shoulder blades together and down your back.  3. Ease about FDC off this position and hold for 10 seconds, then relax for 10 seconds.  4. Repeat 10 times.  14. SCAPULAR RETRACTION/PROTRACTION       1. Lay on your stomach on an edge with your affected arm hanging off the side.  2. Slowly raise your arm keeping your elbow straight by drawing your shoulder blade to the other side. You are not raising your arm straight out to your side, but elevating your arm.  3. Repeat 10 times.  4. Complete 2 sets.  15. BENT-OVER HORIZONTAL ABDUCTION       1. Lay on your stomach on an edge with your affected arm hanging off the side.  2. Slowly raise your arm keeping your elbow straight by raising your arm out to your side. Control the movement.  3. Repeat 10 times.  4. Complete 2 sets.  16. INTERNAL AND EXTERNAL ROTATION       1. Lay on your back on a steady surface.  2. Raise your arm to 90 degrees and lift your fingers to face upwards.  3. Keeping your arm bend, slowly move your arm as shown.  4. Bring your arm to a smaller angle (45 degrees) if 90 degrees hurts.  5. Repeat 20 times.  6. Complete 3-4 sets.  17. EXTERNAL ROTATION       1. Lay on your side on a steady surface with your unaffected arm cradling your head.  2. Hold your arm at a 90 degree angle, keeping your affected arms elbow tucked at your side.  3. Slowly raise your arm to a vertical position and lower the weight slowly.  4. Repeat 10 times.  5. Complete 2-3 sets.  18. INTERNAL ROTATION       1. Lay on your side on a flat surface on the side of the affected arm  2. Hold your arm at a 90 degree angle, keeping your affected arms elbow tucked at your side.  3. Slowly raise your arm to a vertical position and lower the weight slowly.  4. Repeat 10  times.  5. Complete 2-3 sets.

## 2024-07-08 ENCOUNTER — TELEPHONE (OUTPATIENT)
Age: 49
End: 2024-07-08

## 2024-07-08 ENCOUNTER — SOCIAL WORK (OUTPATIENT)
Dept: BEHAVIORAL/MENTAL HEALTH CLINIC | Facility: CLINIC | Age: 49
End: 2024-07-08
Payer: COMMERCIAL

## 2024-07-08 DIAGNOSIS — G89.4 CHRONIC PAIN DISORDER: ICD-10-CM

## 2024-07-08 DIAGNOSIS — F41.1 GENERALIZED ANXIETY DISORDER: ICD-10-CM

## 2024-07-08 DIAGNOSIS — F90.0 ADHD, PREDOMINANTLY INATTENTIVE TYPE: ICD-10-CM

## 2024-07-08 DIAGNOSIS — F33.0 MAJOR DEPRESSIVE DISORDER, RECURRENT, MILD (HCC): Primary | ICD-10-CM

## 2024-07-08 PROCEDURE — 90834 PSYTX W PT 45 MINUTES: CPT | Performed by: COUNSELOR

## 2024-07-08 NOTE — PSYCH
Behavioral Health Psychotherapy Progress Note    Psychotherapy Provided: Individual Psychotherapy     1. Major depressive disorder, recurrent, mild (HCC)        2. Generalized anxiety disorder        3. ADHD, predominantly inattentive type        4. Chronic pain disorder            Goals addressed in session: Goal 1     DATA: Emil was in person for session.  Has returned from vacation and is having a few weeks prior to his care home from the police [place].  Shared that things broke loose during the vacation and on return and that he had had a not.  Shared that his wife was blaming him for certain things on the vacation that were not true and that when they returned home he had asked his sister to watch the house and the dogs.  Shared that his wife kept making comments about her presence there.  It is their children's presence there and then made a nasty comment upon return that the place smelled.  He became angry and told her to stop as well as stating that he was setting a limit regarding funding for certain items after his care home.  Noted that he would no longer pay for her extra car and his insurance as well as some other personal things that she wants.  Stated that she could work a few extra hours in order to manage these expenses but he was not going to do it with his reduced income.  Also noted that he may need to brice his  as he feels he has taken money inappropriately and is concerned about the business.  During this session, this clinician used the following therapeutic modalities: Client-centered Therapy and Cognitive Behavioral Therapy    Substance Abuse was not addressed during this session. If the client is diagnosed with a co-occurring substance use disorder, please indicate any changes in the frequency or amount of use: Not addressed. Stage of change for addressing substance use diagnoses: Pre-contemplation    ASSESSMENT:  Emil Jacobs presents with a Euthymic/ normal and Anxious  "mood.     his affect is Normal range and intensity, which is congruent, with his mood and the content of the session. The client has made progress on their goals.    Is finally ready to set limits with his wife but may come down too hard.  Will continue to explore communication and emotional regulation.  Emil Jacobs presents with a none risk of suicide, none risk of self-harm, and none risk of harm to others.    For any risk assessment that surpasses a \"low\" rating, a safety plan must be developed.    A safety plan was indicated: no  If yes, describe in detail not applicable    PLAN: Between sessions, Emil Jacobs will continue to work on developing more assertive communication and sticking up for himself with his wife. At the next session, the therapist will use Client-centered Therapy, Cognitive Behavioral Therapy, and Mindfulness-based Strategies to address mood regulation.    Behavioral Health Treatment Plan and Discharge Planning: Emil Jacobs is aware of and agrees to continue to work on their treatment plan. They have identified and are working toward their discharge goals. yes    Visit start and stop times:    07/08/24  Start Time: 1516  Stop Time: 1600  Total Visit Time: 44 minutes  "

## 2024-07-08 NOTE — TELEPHONE ENCOUNTER
Writer called and verified is no authorization needed. But writer will call back in September. Patient has a new plan starting. Writer will call and schedule accordingly

## 2024-07-15 ENCOUNTER — TELEPHONE (OUTPATIENT)
Age: 49
End: 2024-07-15

## 2024-07-15 NOTE — TELEPHONE ENCOUNTER
Writer called patient in regards to scheduling for testing with . writer asked patient if he was still interested in services. Patient stated he doesn't think he needs it. Writer then removed from the wait list due to patient not wanting services.

## 2024-07-19 ENCOUNTER — HOSPITAL ENCOUNTER (OUTPATIENT)
Dept: RADIOLOGY | Facility: HOSPITAL | Age: 49
Discharge: HOME | End: 2024-07-19
Payer: COMMERCIAL

## 2024-07-19 DIAGNOSIS — M76.61 TENDONITIS, ACHILLES, RIGHT: ICD-10-CM

## 2024-07-19 PROCEDURE — 73721 MRI JNT OF LWR EXTRE W/O DYE: CPT

## 2024-07-22 ENCOUNTER — SOCIAL WORK (OUTPATIENT)
Dept: BEHAVIORAL/MENTAL HEALTH CLINIC | Facility: CLINIC | Age: 49
End: 2024-07-22
Payer: COMMERCIAL

## 2024-07-22 DIAGNOSIS — F90.0 ADHD, PREDOMINANTLY INATTENTIVE TYPE: ICD-10-CM

## 2024-07-22 DIAGNOSIS — F41.1 GENERALIZED ANXIETY DISORDER: ICD-10-CM

## 2024-07-22 DIAGNOSIS — F33.0 MAJOR DEPRESSIVE DISORDER, RECURRENT, MILD (HCC): Primary | ICD-10-CM

## 2024-07-22 PROCEDURE — 90834 PSYTX W PT 45 MINUTES: CPT | Performed by: COUNSELOR

## 2024-07-22 NOTE — PSYCH
Behavioral Health Psychotherapy Progress Note    Psychotherapy Provided: Individual Psychotherapy     1. Major depressive disorder, recurrent, mild (HCC)        2. Generalized anxiety disorder        3. ADHD, predominantly inattentive type            Goals addressed in session: Goal 1     DATA: Emil was in person for session.  Discussed communication concerns with his wife. Noted that he is making plans for his [employer] party and is not including his wife.  Feels that she will not do what he wants and is trying to the event.  Shared that she is still upset by his financial limits but he is unwavering in his decision.  Also shared that his sons and he were in [facility] for a [facility].  Continue to discuss communication style with his wife and is willing to accept feedback about where he is being abrupt or unclear in his communication.  Noted that he was quick to point out her transgressions but needs to focus on his own style which has become somewhat controlling.  Also discussed possibility of having his wife come to sessions and what that would look like.  He stated at first that he would not want to be here but I suggested that I would want him available to listen to what she is saying and how she is saying it.  He stated he would think about whether he would like to do this or not.  During this session, this clinician used the following therapeutic modalities: Client-centered Therapy and Cognitive Behavioral Therapy    Substance Abuse was addressed during this session. If the client is diagnosed with a co-occurring substance use disorder, please indicate any changes in the frequency or amount of use: Noted that he does tend to drink and sometimes to excess but also tries to stay in control especially regarding work.  Notes that he can differentiate between leisure time and completing his necessary responsibilities. Stage of change for addressing substance use diagnoses:  "Pre-contemplation    ASSESSMENT:  Emil Jacobs presents with a Euthymic/ normal mood.     his affect is Normal range and intensity, which is congruent, with his mood and the content of the session. The client has made progress on their goals.    Continues to be willing to process issues with his wife and is open to looking at his own role in communication.  Emil Jacobs presents with a none risk of suicide, none risk of self-harm, and none risk of harm to others.    For any risk assessment that surpasses a \"low\" rating, a safety plan must be developed.    A safety plan was indicated: no  If yes, describe in detail: Not applicable  PLAN: Between sessions, Emil Jacobs will continue to explore communication patterns and his reactions to situations at home. At the next session, the therapist will use Client-centered Therapy, Cognitive Behavioral Therapy, and Solution-Focused Therapy to address mood regulation.    Behavioral Health Treatment Plan and Discharge Planning: Emil Jacobs is aware of and agrees to continue to work on their treatment plan. They have identified and are working toward their discharge goals. yes    Visit start and stop times:    07/22/24  Start Time: 1508  Stop Time: 1553  Total Visit Time: 45 minutes  "

## 2024-07-29 ENCOUNTER — SOCIAL WORK (OUTPATIENT)
Dept: BEHAVIORAL/MENTAL HEALTH CLINIC | Facility: CLINIC | Age: 49
End: 2024-07-29
Payer: COMMERCIAL

## 2024-07-29 DIAGNOSIS — F90.0 ADHD, PREDOMINANTLY INATTENTIVE TYPE: ICD-10-CM

## 2024-07-29 DIAGNOSIS — F33.0 MAJOR DEPRESSIVE DISORDER, RECURRENT, MILD (HCC): Primary | ICD-10-CM

## 2024-07-29 DIAGNOSIS — F41.1 GENERALIZED ANXIETY DISORDER: ICD-10-CM

## 2024-07-29 PROCEDURE — 90837 PSYTX W PT 60 MINUTES: CPT | Performed by: COUNSELOR
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
## 2024-08-16 ENCOUNTER — APPOINTMENT (OUTPATIENT)
Dept: RADIOLOGY | Facility: CLINIC | Age: 49
End: 2024-08-16
Payer: COMMERCIAL

## 2024-08-16 ENCOUNTER — OFFICE VISIT (OUTPATIENT)
Age: 49
End: 2024-08-16
Payer: COMMERCIAL

## 2024-08-16 VITALS
WEIGHT: 280 LBS | SYSTOLIC BLOOD PRESSURE: 156 MMHG | BODY MASS INDEX: 39.2 KG/M2 | HEIGHT: 71 IN | DIASTOLIC BLOOD PRESSURE: 88 MMHG | HEART RATE: 78 BPM

## 2024-08-16 DIAGNOSIS — M25.561 PAIN IN BOTH KNEES, UNSPECIFIED CHRONICITY: ICD-10-CM

## 2024-08-16 DIAGNOSIS — M76.61 TENDONITIS, ACHILLES, RIGHT: ICD-10-CM

## 2024-08-16 DIAGNOSIS — M25.552 LEFT HIP PAIN: Primary | ICD-10-CM

## 2024-08-16 DIAGNOSIS — M25.562 PAIN IN BOTH KNEES, UNSPECIFIED CHRONICITY: ICD-10-CM

## 2024-08-16 DIAGNOSIS — M17.10 ARTHRITIS OF KNEE: ICD-10-CM

## 2024-08-16 DIAGNOSIS — M25.551 RIGHT HIP PAIN: ICD-10-CM

## 2024-08-16 PROCEDURE — 73522 X-RAY EXAM HIPS BI 3-4 VIEWS: CPT

## 2024-08-16 PROCEDURE — 73562 X-RAY EXAM OF KNEE 3: CPT

## 2024-08-16 PROCEDURE — 99214 OFFICE O/P EST MOD 30 MIN: CPT | Performed by: ORTHOPAEDIC SURGERY

## 2024-08-16 RX ORDER — MELOXICAM 15 MG/1
15 TABLET ORAL DAILY
Qty: 30 TABLET | Refills: 0 | Status: SHIPPED | OUTPATIENT
Start: 2024-08-16 | End: 2024-09-15

## 2024-08-16 NOTE — PROGRESS NOTES
Assessment/Plan:  1. Left hip pain  XR hips bilateral 3-4 vw w pelvis if performed      2. Right hip pain  XR hips bilateral 3-4 vw w pelvis if performed      3. Arthritis of knee  CANCELED: XR knee 4+ vw right injury    CANCELED: XR knee 4+ vw left injury      4. Tendonitis, Achilles, right  Ambulatory Referral to Physical Therapy    meloxicam (MOBIC) 15 mg tablet    Cam Boot    Wedge Achillies      5. Pain in both knees, unspecified chronicity            The patient does have achilles tendinosis on MRI, without obvious tear. We will start with conservative treamtent for this. He was given a boot with 2 wedges. He will also do physical therapy for the ankle. I renewed his Mobic today and he can also try topical voltaren. If he fails to make progress, we discussed possible gastrocnemius recession in the future.  We also discussed possible PRP injections.  He also has bilateral knee arthritis. We discussed corticosteroid vs visco supplementation injections in the future if needed. For now he would like to focus on his ankle. He will FU in 6 weeks if he fails to make progress with his ankle.     Subjective:   Emil Jacobs is a 49 y.o. adult who presents today for follow-up of his right ankle. MRI showed achilles tendinosis with marked thickening, but no discrete tear. He notes ongoing posterior right ankle pain, which is worse with really any activity and better with rest. He also notes his left posterior ankle is starting to bother him some, though not as bad as the right. He also notes bilateral knee pain. He has had prior ACL reconstructions (right 4 years ago, and left in 2003). He has never had any injections for his knee. He also notes some lateral bilateral hip pain at times, which he thinks is from his altered gait from his ankle/knee issues.        Review of Systems   Constitutional: Negative.  Negative for chills and fever.   HENT: Negative.  Negative for ear pain and sore throat.    Eyes: Negative.   Negative for pain and redness.   Respiratory: Negative.  Negative for shortness of breath and wheezing.    Cardiovascular:  Negative for chest pain and palpitations.   Gastrointestinal: Negative.  Negative for abdominal pain and blood in stool.   Endocrine: Negative.  Negative for polydipsia and polyuria.   Genitourinary: Negative.  Negative for difficulty urinating and dysuria.   Musculoskeletal:         As noted in HPI   Skin: Negative.  Negative for pallor and rash.   Neurological: Negative.  Negative for dizziness and numbness.   Hematological: Negative.  Negative for adenopathy. Does not bruise/bleed easily.   Psychiatric/Behavioral: Negative.  Negative for confusion and suicidal ideas.          Past Medical History:   Diagnosis Date   • Chronic pain disorder     lumbar    • CPAP (continuous positive airway pressure) dependence    • GERD (gastroesophageal reflux disease)     diet controlled   • Helicobacter pylori gastritis 4/18/2017   • Sleep apnea     does not wear CPAP       Past Surgical History:   Procedure Laterality Date   • COLONOSCOPY N/A 4/7/2017    Procedure: COLONOSCOPY;  Surgeon: Lacey Dennis MD;  Location: [facility];  Service:    • ESOPHAGOGASTRODUODENOSCOPY N/A 4/7/2017    Procedure: ESOPHAGOGASTRODUODENOSCOPY (EGD);  Surgeon: Lacey Dennis MD;  Location: [facility];  Service:    • FL INJECTION RIGHT SHOULDER (ARTHROGRAM)  4/1/2024   • KNEE ARTHROSCOPY Left     x4 and ACL repair   • KNEE ARTHROSCOPY Right     x2 and ACL repair   • KNEE ARTHROSCOPY W/ AUTOGENOUS CARTILAGE IMPLANTATION (ACI) PROCEDURE Left    • SINUS SURGERY     • TONSILLECTOMY     • UPPER GASTROINTESTINAL ENDOSCOPY         Family History   Problem Relation Age of Onset   • Breast cancer Mother    • No Known Problems Father    • Cancer Sister         breast   • Breast cancer Sister    • Liver cancer Maternal Grandmother    • Colon cancer Paternal Grandmother    • Mental illness Neg Hx    • Diabetes Neg Hx        Social  "History     Occupational History   • Not on file   Tobacco Use   • Smoking status: Former     Current packs/day: 0.00     Average packs/day: 0.5 packs/day for 5.0 years (2.5 ttl pk-yrs)     Types: Cigarettes     Start date:      Quit date:      Years since quittin.6   • Smokeless tobacco: Never   Vaping Use   • Vaping status: Never Used   Substance and Sexual Activity   • Alcohol use: Yes     Comment: daily   • Drug use: No   • Sexual activity: Not on file         Current Outpatient Medications:   •  amphetamine-dextroamphetamine (ADDERALL XR, 20MG,) 20 MG 24 hr capsule, Take 1 capsule (20 mg total) by mouth every morning Max Daily Amount: 20 mg, Disp: 30 capsule, Rfl: 0  •  meloxicam (MOBIC) 15 mg tablet, Take 1 tablet (15 mg total) by mouth daily, Disp: 30 tablet, Rfl: 0  •  tadalafil (CIALIS) 20 MG tablet, Take 1 tablet (20 mg total) by mouth daily as needed for erectile dysfunction, Disp: 30 tablet, Rfl: 5  •  testosterone cypionate (DEPO-TESTOSTERONE) 200 mg/mL SOLN, Inject 0.25 mL (50 mg total) into a muscle 2 (two) times a week, Disp: 10 mL, Rfl: 3  •  valsartan (DIOVAN) 80 mg tablet, Take 80 mg by mouth every morning, Disp: , Rfl:   •  ciprofloxacin-dexamethasone (CIPRODEX) otic suspension, Administer 4 drops into the left ear 2 (two) times a day for 10 days, Disp: 7.5 mL, Rfl: 0  •  ofloxacin (FLOXIN) 0.3 % otic solution, Use 4 gtts to left ear bid x 7 days prn drainage (Patient not taking: Reported on 7/3/2024), Disp: 5 mL, Rfl: 0  •  SYRINGE/NEEDLE, DISP, 1 ML 25G X 5/8\" 1 ML MISC, Use once a week (Patient not taking: Reported on 7/3/2024), Disp: 15 each, Rfl: 3    Allergies   Allergen Reactions   • Doxycycline Itching   • Tetracycline Rash     Severe itching       Objective:  Vitals:    24 0940   BP: 156/88   Pulse: 78     Pain Score:   3      Right Knee Exam     Tenderness   The patient is experiencing tenderness in the medial joint line.    Range of Motion   Extension:  0   Flexion:  " 120     Other   Erythema: absent  Scars: present  Sensation: normal  Pulse: present  Swelling: none  Effusion: no effusion present      Left Knee Exam     Tenderness   The patient is experiencing tenderness in the medial joint line.    Range of Motion   Extension:  0   Flexion:  120     Other   Erythema: absent  Sensation: normal  Pulse: present  Swelling: none  Effusion: no effusion present          Observations   Left Knee   Negative for effusion.     Right Knee   Negative for effusion.     Right Ankle Exam      Tenderness   Right ankle tenderness location: midsubstance achilles tendon.  Swelling: moderate     Range of Motion   The patient has normal right ankle ROM.  Right ankle dorsiflexion: with pain.      Muscle Strength   Plantar flexion:  5/5     Other   Erythema: absent  Sensation: normal  Pulse: present      Comments:  Negative Samson test.        Physical Exam  Constitutional:       General: He is not in acute distress.     Appearance: He is well-developed.   HENT:      Head: Normocephalic and atraumatic.   Eyes:      General: No scleral icterus.     Conjunctiva/sclera: Conjunctivae normal.   Neck:      Vascular: No JVD.   Cardiovascular:      Rate and Rhythm: Normal rate.   Pulmonary:      Effort: Pulmonary effort is normal. No respiratory distress.   Musculoskeletal:      Right knee: No effusion.      Left knee: No effusion.      Comments: As per HPI   Skin:     General: Skin is warm.   Neurological:      Mental Status: He is alert and oriented to person, place, and time.      Coordination: Coordination normal.         I have personally reviewed pertinent films in PACS and my interpretation is as follows:  MRI right ankle: Achilles tendinosis with thickening. No discrete tear.   Xrays bilateral knees: Left knee hardware intact. Bilateral tricompartmental arthritis  Xrays bilateral hips: HERBERTH. No acute osseous abnormality      This document was created using speech voice recognition software.    Grammatical errors, random word insertions, pronoun errors, and incomplete sentences are an occasional consequence of this system due to software limitations, ambient noise, and hardware issues.   Any formal questions or concerns about content, text, or information contained within the body of this dictation should be directly addressed to the provider for clarification.

## 2024-08-26 DIAGNOSIS — F90.0 ADHD, PREDOMINANTLY INATTENTIVE TYPE: ICD-10-CM

## 2024-08-26 NOTE — TELEPHONE ENCOUNTER
Reason for call:   [x] Refill   [] Prior Auth  [] Other:     Office:   [] PCP/Provider -   [x] Specialty/Provider - [facility]     Medication: amphetamine-dextroamphetamine (ADDERALL XR, 20MG,) 20 MG 24 hr capsule     Dose/Frequency: 20 mg, Every morning     Quantity: 30    Pharmacy: [id number]    Does the patient have enough for 3 days?   [x] Yes   [] No - Send as HP to POD

## 2024-08-27 RX ORDER — DEXTROAMPHETAMINE SACCHARATE, AMPHETAMINE ASPARTATE MONOHYDRATE, DEXTROAMPHETAMINE SULFATE AND AMPHETAMINE SULFATE 5; 5; 5; 5 MG/1; MG/1; MG/1; MG/1
20 CAPSULE, EXTENDED RELEASE ORAL EVERY MORNING
Qty: 30 CAPSULE | Refills: 0 | Status: SHIPPED | OUTPATIENT
Start: 2024-08-27

## 2024-08-28 DIAGNOSIS — E29.1 HYPOGONADISM IN MALE: ICD-10-CM

## 2024-08-28 DIAGNOSIS — N52.9 ED (ERECTILE DYSFUNCTION) OF ORGANIC ORIGIN: ICD-10-CM

## 2024-08-29 ENCOUNTER — EVALUATION (OUTPATIENT)
Dept: PHYSICAL THERAPY | Facility: CLINIC | Age: 49
End: 2024-08-29
Payer: COMMERCIAL

## 2024-08-29 DIAGNOSIS — M76.61 TENDONITIS, ACHILLES, RIGHT: Primary | ICD-10-CM

## 2024-08-29 PROCEDURE — 97161 PT EVAL LOW COMPLEX 20 MIN: CPT | Performed by: PHYSICAL THERAPIST

## 2024-08-29 RX ORDER — TESTOSTERONE CYPIONATE 200 MG/ML
50 INJECTION, SOLUTION INTRAMUSCULAR 2 TIMES WEEKLY
Qty: 10 ML | Refills: 0 | Status: SHIPPED | OUTPATIENT
Start: 2024-08-29

## 2024-08-29 NOTE — PROGRESS NOTES
PT Evaluation     Today's date: 2024  Patient name: Emil Jacobs  : 1975  MRN: [id number]  Referring provider: Berhane Patterson PA-C  Dx:   Encounter Diagnosis     ICD-10-CM    1. Tendonitis, Achilles, right  M76.61 Ambulatory Referral to Physical Therapy                     Assessment  Impairments: abnormal gait, abnormal muscle firing, abnormal muscle tone, abnormal or restricted ROM, abnormal movement, activity intolerance, impaired balance, impaired physical strength, lacks appropriate home exercise program, pain with function, weight-bearing intolerance, poor posture  and poor body mechanics    Assessment details: Emil Jacobs is a 49 y.o. adult who presents to physical therapy with pain, decreased LE range of motion, decreased LE strength, impaired function, decreased activity tolerance, poor balance, swelling , and poor body mechanics. Patient's clinical presentation is consistent with their referring diagnosis of Tendonitis, Achilles, right  (primary encounter diagnosis). The pt presents with functional limitations of ADLs, recreational activities, ambulation, performing household chores, and stair negotiation. Pt would benefit from physical therapy services to address these limitations and maximize function. Pt was instructed and educated on home exercise program today and demonstrates understanding.     Understanding of Dx/Px/POC: good     Prognosis: good    Goals  Short term goals  (3 weeks)  1.  Patient will have no pain right ankle  2 . Patient will have full range of motion right ankle  3.  Patient will report a 50% improvement with activities of daily living   4.  Patient will decrease girth of right ankle by 1 cm.     Long term goals - (6 weeks)  1.  Patient will be independent with home exercise program  2.  Patient will have no gait deviations ambulating on level surfaces.   3.  Patient will report 80 % improvement with activity of daily living function.   4.  Patient will negotiate  stairs up and down pain free with use of one railing.   5. Patient SLS to be equal bilaterally      Plan  Patient would benefit from: PT eval and skilled physical therapy  Planned modality interventions: thermotherapy: hydrocollator packs and cryotherapy    Planned therapy interventions: ADL training, balance/weight bearing training, joint mobilization, manual therapy, massage, neuromuscular re-education, patient education, postural training, strengthening, stretching, functional ROM exercises, therapeutic activities, therapeutic exercise, gait training and home exercise program    Frequency: 2x week  Duration in weeks: 6  Treatment plan discussed with: patient        Subjective Evaluation    History of Present Illness  Mechanism of injury: Right achilles pain for the last 8 or 9 months.  He followed up with Dr. Saleh. He had an MRI.  He then was given a boot.  He was referred to PT  Patient Goals  Patient goals for therapy: decreased pain    Pain  Current pain ratin  At best pain ratin  At worst pain ratin  Location: Right achilles  Quality: burning, cramping, tight, sharp, dull ache and pressure  Relieving factors: support  Aggravating factors: walking, stair climbing and standing    Social Support    Working: retired.  Exercise history: Softball, Yard work        Objective     Palpation     Right   Hypertonic in the lateral gastrocnemius, medial gastrocnemius and soleus.   Tenderness of the lateral gastrocnemius, medial gastrocnemius and soleus.     Tenderness     Right Ankle/Foot   Tenderness in the Achilles insertion and proximal Achilles.     Active Range of Motion   Left Ankle/Foot   Dorsiflexion (ke): 4 degrees   Plantar flexion: 50 degrees   Inversion: 32 degrees   Eversion: 11 degrees     Right Ankle/Foot   Dorsiflexion (ke): 6 degrees   Plantar flexion: 52 degrees   Inversion: 35 degrees   Eversion: 15 degrees     Joint Play     Right Ankle/Foot  Hypomobile in the talocrural joint.      Strength/Myotome Testing     Left Ankle/Foot   Dorsiflexion: 5  Plantar flexion: 5  Inversion: 5  Eversion: 5    Right Ankle/Foot   Dorsiflexion: 5  Plantar flexion: 4+  Inversion: 5  Eversion: 5    Ambulation   Weight-Bearing Status   Assistive device used: none    Observational Gait   Gait: antalgic   Decreased right stance time.     General Comments:      Ankle/Foot Comments   He has edema over right achilles which extends out to lateral malleolus                 Eval/ Re-eval Auth #/ Referral # Total visits Start date  Expiration date Total active units  Total manual units  PT only or  PT+OT?   8/29/24 8/28              Total units authorized                Total units remaining                    Precautions: Standard      Specialty Daily Treatment Diary       Manuals 8/29/24       Visit # 1       STM Gastroc/ soleus/ Tib post Rolling       PROM ankle        Stretch soleus gastroc                Warm-up        NuStep        Neuro Re-Ed        SLS        Sheet stretch        Fascia stretch in half kneel                                        Ther Ex        Knee ext w df 20       Rockerboard        Towel scrunch        HR/ TR seated        4 way TB                        Ther Activity                        Gait Training                        Modalities        CP                           Access Code: [email or web address]  URL: [email or web address]  Date: 08/29/2024  Prepared by: Bulmaro Giron    Exercises  - Seated Long Arc Quad  - 1 x daily - 7 x weekly - 2 sets - 10 reps

## 2024-09-04 ENCOUNTER — OFFICE VISIT (OUTPATIENT)
Dept: PHYSICAL THERAPY | Facility: CLINIC | Age: 49
End: 2024-09-04
Payer: COMMERCIAL

## 2024-09-04 DIAGNOSIS — M76.61 TENDONITIS, ACHILLES, RIGHT: Primary | ICD-10-CM

## 2024-09-04 PROCEDURE — 97112 NEUROMUSCULAR REEDUCATION: CPT | Performed by: PHYSICAL THERAPIST

## 2024-09-04 PROCEDURE — 97110 THERAPEUTIC EXERCISES: CPT | Performed by: PHYSICAL THERAPIST

## 2024-09-04 NOTE — PROGRESS NOTES
Daily Note     Today's date: 2024  Patient name: Emil Jacobs  : 1975  MRN: [id number]  Referring provider: Berhane Patterson PA-C  Dx:   Encounter Diagnosis     ICD-10-CM    1. Tendonitis, Achilles, right  M76.61                      Subjective: He reports no pain currently and being conscious of not allowing pain to occur with his daily activities.      Objective: See treatment diary below      Assessment: Tolerated treatment well. Patient would benefit from continued PT.  Tight and tenderness during STM to gastroc especially medially.  He had no increase in pain during the exercise this session.        Plan: Continue per plan of care.  Add self stretches and self TC glides.         Eval/ Re-eval Auth #/ Referral # Total visits Start date  Expiration date Total active units  Total manual units  PT only or  PT+OT?   24             Total units authorized                Total units remaining                    Precautions: Standard      Specialty Daily Treatment Diary       Manuals 24      Visit #       STM Gastroc/ soleus/ Tib post Rolling 3 min      PROM ankle        Stretch soleus gastroc  3 min      TC mobs  2 min              Warm-up        Bike  5 min      Neuro Re-Ed        SLS        Sheet stretch  W strap  10x  10s      Soleus stretch on stair        TC post glide with loop                        Ther Ex        Knee ext w df 20 20      Rockerboard  30 ea dir      Sidre step crouch  5 laps  10 ft      HR/ TR seated  30      4 way TB        Leg press  30  95#      Leg press HR  30  45#                      Ther Activity                        Gait Training                        Modalities                                   Access Code: [email or web address]  URL: [email or web address]  Date: 2024  Prepared by: Bulmaro Giron    Exercises  - Seated Long Arc Quad  - 1 x daily - 7 x weekly - 2 sets - 10 reps

## 2024-09-06 ENCOUNTER — OFFICE VISIT (OUTPATIENT)
Dept: PHYSICAL THERAPY | Facility: CLINIC | Age: 49
End: 2024-09-06
Payer: COMMERCIAL

## 2024-09-06 DIAGNOSIS — M76.61 TENDONITIS, ACHILLES, RIGHT: Primary | ICD-10-CM

## 2024-09-06 PROCEDURE — 97110 THERAPEUTIC EXERCISES: CPT | Performed by: PHYSICAL THERAPIST

## 2024-09-06 PROCEDURE — 97140 MANUAL THERAPY 1/> REGIONS: CPT | Performed by: PHYSICAL THERAPIST

## 2024-09-06 NOTE — PROGRESS NOTES
Daily Note     Today's date: 2024  Patient name: Emil Jacobs  : 1975  MRN: [id number]  Referring provider: Berhane Patterson PA-C  Dx:   Encounter Diagnosis     ICD-10-CM    1. Tendonitis, Achilles, right  M76.61                  Subjective: Patient reports that he felt good after stretching last visit and that he needs to work on his flexibility. Reports that he has been trying to not overdue things. Has been keeping up with stretching at home.     Objective: See treatment diary below    Assessment: Tolerated treatment well. Patient would benefit from continued PT.  Patient reported feeling okay post treatment. Added stretch out strap to HEP.    Plan: Continue per plan of care.  Add self stretches and self TC glides.       Eval/ Re-eval Auth #/ Referral # Total visits Start date  Expiration date Total active units  Total manual units  PT only or  PT+OT?   24 needed 12 v             Total units authorized  11 10 9            Total units remaining  1 2 3              Precautions: Standard    Manuals 24     Visit #  3 of 12     STM Gastroc/ soleus/ Tib post Rolling 3 min 3'     Stretch soleus gastroc  3 min 3'     TC mobs  2 min 2'             Warm-up        Bike  5 min 7' upright     Neuro Re-Ed        SLS        Sheet stretch  W strap  10x  10s      Soleus stretch on stair        TC post glide with loop                        Ther Ex        Knee ext w df 20 20      Rockerboard  30 ea dir      Sidre step crouch  5 laps  10 ft      HR/ TR seated  30      Leg press  30  95# 30, 95#     Leg press HR  30  45# 30, 55#                     Ther Activity                          Access Code: [email or web address]  URL: [email or web address]  Date: 2024  Prepared by: Bulmaro Giron    Exercises  - Seated Long Arc Quad  - 1 x daily - 7 x weekly - 2 sets - 10 reps

## 2024-09-09 ENCOUNTER — TELEPHONE (OUTPATIENT)
Dept: PSYCHIATRY | Facility: CLINIC | Age: 49
End: 2024-09-09

## 2024-09-09 ENCOUNTER — OFFICE VISIT (OUTPATIENT)
Dept: PHYSICAL THERAPY | Facility: CLINIC | Age: 49
End: 2024-09-09
Payer: COMMERCIAL

## 2024-09-09 DIAGNOSIS — M76.61 TENDONITIS, ACHILLES, RIGHT: Primary | ICD-10-CM

## 2024-09-09 PROCEDURE — 97110 THERAPEUTIC EXERCISES: CPT | Performed by: PHYSICAL THERAPIST

## 2024-09-09 PROCEDURE — 97112 NEUROMUSCULAR REEDUCATION: CPT | Performed by: PHYSICAL THERAPIST

## 2024-09-09 NOTE — PROGRESS NOTES
Daily Note     Today's date: 2024  Patient name: Emil Jacobs  : 1975  MRN: [id number]  Referring provider: Berhane Patterson PA-C  Dx:   Encounter Diagnosis     ICD-10-CM    1. Tendonitis, Achilles, right  M76.61                  Subjective: Patient reports that he felt good after stretching last visit and that he needs to work on his flexibility. Reports that he has been trying to not overdue things. Has been keeping up with stretching at home.     Objective: See treatment diary below    Assessment: Tolerated treatment well. Patient would benefit from continued PT. Added Prostretch with no adverse effects.  He agreed to purchase one for home.  He attempted HR based on Alfredson protocol.  He had 6/10 pain with FWB when attempting SL eccentric control.  He could reduce pain to 3/10 if using upper extremity support to un-weight his right leg during HR.    Plan: Continue per plan of care.  Add self TC glides with loop       Eval/ Re-eval Auth #/ Referral # Total visits Start date  Expiration date Total active units  Total manual units  PT only or  PT+OT?   24 needed 12 v            Total units authorized  11 10 9            Total units remaining  1 2 3              Precautions: Standard    Manuals 24    Visit # 12  3     STM Gastroc/ soleus/ Tib post Rolling 3 min 3'     Stretch soleus gastroc  3 min 3'     TC mobs  2 min 2'             Warm-up        Bike  5 min 7' upright 7' weight    Neuro Re-Ed        SLS        Sheet stretch  W strap  10x  10s  Prostretch  10x ea side    Soleus stretch on stair        TC post glide with loop        Alfredson Protocol 2 up one down HR below stair    2x10 ea knee ext and knee flex            Ther Ex        Knee ext w df 20 20      Rockerboard  30 ea dir      Side step crouch  5 laps  10 ft      HR/ TR seated  30      Leg press  30  95# 30, 95# 30   95#  SL    Leg press  HR  30  45# 30, 55# 30   55#  SL                    Ther Activity                          Access Code: [email or web address]  URL: [email or web address]  Date: 08/29/2024  Prepared by: Bulmaro Giron    Exercises  - Seated Long Arc Quad  - 1 x daily - 7 x weekly - 2 sets - 10 reps

## 2024-09-11 ENCOUNTER — OFFICE VISIT (OUTPATIENT)
Dept: PHYSICAL THERAPY | Facility: CLINIC | Age: 49
End: 2024-09-11
Payer: COMMERCIAL

## 2024-09-11 DIAGNOSIS — M76.61 TENDONITIS, ACHILLES, RIGHT: Primary | ICD-10-CM

## 2024-09-11 PROCEDURE — 97110 THERAPEUTIC EXERCISES: CPT | Performed by: PHYSICAL THERAPIST

## 2024-09-11 NOTE — PROGRESS NOTES
Daily Note     Today's date: 2024  Patient name: Emil Jacobs  : 1975  MRN: [id number]  Referring provider: Berhane Patterson PA-C  Dx:   Encounter Diagnosis     ICD-10-CM    1. Tendonitis, Achilles, right  M76.61                  Subjective: Emil reports Trying the heel raise protocol at home with no significant pain.       Objective: See treatment diary below      Assessment: Tolerated treatment well. Patient would benefit from continued PT.  Noted gastroc tightness which responded well to STM and stretching.  He had soreness with this.  He had no pain with heel raises below step height while standing in bilateral stance.      Plan: Continue per plan of care.  Add self TC glides with loop         Eval/ Re-eval Auth #/ Referral # Total visits Start date  Expiration date Total active units  Total manual units  PT only or  PT+OT?   24 needed 12 v           Total units authorized  11 10 9 8           Total units remaining  1 2 3 4             Precautions: Standard    Manuals 24   Visit #  3     STM Gastroc/ soleus/ Tib post Rolling 3 min 3'  3'   Stretch soleus gastroc  3 min 3'  3'   TC mobs  2 min 2'  2'           Warm-up        Bike  5 min 7' upright 7' weight 7' bike   Neuro Re-Ed        SLS        Sheet stretch  W strap  10x  10s  Prostretch  10x ea side 10 ea  Prostretch   Soleus stretch on stair        TC post glide with loop        Alfredson Protocol 2 up one down HR below stair    2x10 ea knee ext and knee flex 2x10 ea knee extended and flexed.  2 up 2 down           Ther Ex        Knee ext w df 20 20   20   Rockerboard  30 ea dir      Side step crouch  5 laps  10 ft      HR/ TR seated  30      Leg press  30  95# 30, 95# 30   95#  SL 30   95#  SL   Leg press HR  30  45# 30, 55# 30   55#  SL 30   55#  SL                   Ther Activity                          Access Code:  [email or web address]  URL: [email or web address]  Date: 08/29/2024  Prepared by: Bulmaro Giron    Exercises  - Seated Long Arc Quad  - 1 x daily - 7 x weekly - 2 sets - 10 reps

## 2024-09-13 NOTE — PATIENT INSTRUCTIONS
Take NSAID as prescribed for suspected right sided sciatica  Use muscle relaxer at night as needed for discomfort/spasms  Ice/heat to area as tolerated  Avoid aggravating factors such as squatting, bending or lifting  Follow up with PCP  Return or be seen in ER with progressing or worsening symptoms 
Zoe

## 2024-09-16 ENCOUNTER — OFFICE VISIT (OUTPATIENT)
Dept: PHYSICAL THERAPY | Facility: CLINIC | Age: 49
End: 2024-09-16
Payer: COMMERCIAL

## 2024-09-16 ENCOUNTER — TELEPHONE (OUTPATIENT)
Age: 49
End: 2024-09-16

## 2024-09-16 ENCOUNTER — TELEPHONE (OUTPATIENT)
Dept: PSYCHIATRY | Facility: CLINIC | Age: 49
End: 2024-09-16

## 2024-09-16 DIAGNOSIS — M76.61 TENDONITIS, ACHILLES, RIGHT: Primary | ICD-10-CM

## 2024-09-16 PROCEDURE — 97110 THERAPEUTIC EXERCISES: CPT | Performed by: PHYSICAL THERAPIST

## 2024-09-16 NOTE — PROGRESS NOTES
"Daily Note     Today's date: 2024  Patient name: Emil Jacobs  : 1975  MRN: [id number]  Referring provider: Berhane Patterson PA-C  Dx:   Encounter Diagnosis     ICD-10-CM    1. Tendonitis, Achilles, right  M76.61                  Subjective: Pain is not bad today      Objective: See treatment diary below      Assessment: Tolerated treatment well. Patient would benefit from continued PT.  He had noted smaller size swelling mid tendon compared to at eval.  Right ankle also less swollen.        Plan: Continue per plan of care.           Eval/ Re-eval Auth #/ Referral # Total visits Start date  Expiration date Total active units  Total manual units  PT only or  PT+OT?   24 needed 12 v           Total units authorized  11 10 9 8           Total units remaining  1 2 3 4             Precautions: Standard    Manuals 24   Visit # 12 3 of 12 4/12 5/12  6/12   STM Gastroc/ soleus/ Tib post 3 min 3'  3' 3'   Stretch soleus gastroc 3 min 3'  3' 3'   TC mobs 2 min 2'  2' 2'           Warm-up        Bike 5 min 7' upright 7' weight 7' bike 7' bike   Neuro Re-Ed        SLS        Sheet stretch W strap  10x  10s  Prostretch  10x ea side 10 ea  Prostretch 10  Prostretch   Soleus stretch on stair     10  8\"   TC post glide with loop     10 blk loop   Alfredson Protocol 2 up one down HR below stair   2x10 ea knee ext and knee flex 2x10 ea knee extended and flexed.  2 up 2 down 2x10 ea bilat HR knee ext and 2x10 knee flexed           Ther Ex        Knee ext w df 20   20    Rockerboard 30 ea dir    BkTB 3x10 fast pace PF   Side step crouch 5 laps  10 ft       HR/ TR seated 30       Leg press 30  95# 30, 95# 30   95#  SL 30   95#  SL 30  95#  SL   Leg press HR 30  45# 30, 55# 30   55#  SL 30   55#  SL 30  55#  SL                   Ther Activity                          Access Code: [email or web address]  URL: " [email or web address]  Date: 08/29/2024  Prepared by: Bulmaro Giron    Exercises  - Seated Long Arc Quad  - 1 x daily - 7 x weekly - 2 sets - 10 reps

## 2024-09-16 NOTE — TELEPHONE ENCOUNTER
Received a message form Call center that pt was running 5 min late. At 3:20 pm writer called pt.  Emil said he never said that and he was on hold for 20 min. He wanted Zeinab to call him back but Zeinab is in session with another pt. Emil is scheduled for Wednesday at 2pm

## 2024-09-18 ENCOUNTER — OFFICE VISIT (OUTPATIENT)
Dept: PHYSICAL THERAPY | Facility: CLINIC | Age: 49
End: 2024-09-18
Payer: COMMERCIAL

## 2024-09-18 ENCOUNTER — SOCIAL WORK (OUTPATIENT)
Dept: BEHAVIORAL/MENTAL HEALTH CLINIC | Facility: CLINIC | Age: 49
End: 2024-09-18
Payer: COMMERCIAL

## 2024-09-18 DIAGNOSIS — F41.1 GENERALIZED ANXIETY DISORDER: ICD-10-CM

## 2024-09-18 DIAGNOSIS — F33.0 MAJOR DEPRESSIVE DISORDER, RECURRENT, MILD (HCC): Primary | ICD-10-CM

## 2024-09-18 DIAGNOSIS — F90.0 ADHD, PREDOMINANTLY INATTENTIVE TYPE: ICD-10-CM

## 2024-09-18 DIAGNOSIS — M76.61 TENDONITIS, ACHILLES, RIGHT: Primary | ICD-10-CM

## 2024-09-18 PROCEDURE — 97110 THERAPEUTIC EXERCISES: CPT | Performed by: PHYSICAL THERAPIST

## 2024-09-18 PROCEDURE — 90837 PSYTX W PT 60 MINUTES: CPT | Performed by: COUNSELOR

## 2024-09-18 NOTE — PROGRESS NOTES
"Daily Note     Today's date: 2024  Patient name: Emil Jacobs  : 1975  MRN: [id number]  Referring provider: Berhane Patterson PA-C  Dx:   Encounter Diagnosis     ICD-10-CM    1. Tendonitis, Achilles, right  M76.61                  Subjective:  He notes compliance with stretches and HR protocol at home.      Objective: See treatment diary below      Assessment: Tolerated treatment well. Patient would benefit from continued PT.   Soft tissue restrictions persist although improving with mobilization techniques.        Plan: Continue per plan of care.   Dry needling next week to gastroc and soleus         Eval/ Re-eval Auth #/ Referral # Total visits Start date  Expiration date Total active units  Total manual units  PT only or  PT+OT?   24 needed 12 v         Total visits remaining 11 10 9 8 7 6 5        Total visits used 1 2 3 4 5 6 7          Precautions: Standard    Manuals 24   Visit # 3 of 12    STM Gastroc/ soleus/ Tib post 3'  3' 3' 5'   Stretch soleus gastroc 3'  3' 3' 3'   TC mobs 2'  2' 2' 2'           Warm-up        Bike 7' upright 7' weight 7' bike 7' bike 7 min   Neuro Re-Ed        SLS        Sheet stretch  Prostretch  10x ea side 10 ea  Prostretch 10  Prostretch 10x   Soleus stretch on stair    10  8\"    TC post glide with loop    10 blk loop 10 blk loop   Alfredson Protocol 2 up one down HR below stair  2x10 ea knee ext and knee flex 2x10 ea knee extended and flexed.  2 up 2 down 2x10 ea bilat HR knee ext and 2x10 knee flexed 2x10 ea           Ther Ex        Knee ext w df   20  20   Rockerboard    BkTB 3x10 fast pace PF    Side step crouch        HR/ TR seated        Leg press 30, 95# 30   95#  SL 30   95#  SL 30  95#  SL 30  95#  SL   Leg press HR 30, 55# 30   55#  SL 30   55#  SL 30  55#  SL 30  55#  SL                   Ther Activity                      "     Access Code: [email or web address]  URL: [email or web address]  Date: 08/29/2024  Prepared by: Bulmaro Giron    Exercises  - Seated Long Arc Quad  - 1 x daily - 7 x weekly - 2 sets - 10 reps

## 2024-09-18 NOTE — PSYCH
"Behavioral Health Psychotherapy Progress Note    Psychotherapy Provided: Individual Psychotherapy     1. Major depressive disorder, recurrent, mild (HCC)        2. ADHD, predominantly inattentive type        3. Generalized anxiety disorder            Goals addressed in session: Goal 1     DATA: Emil was in person for session. Discussed his recent absences. Determined a time and day that will work with his schedule. Anticipating getting a new job and has been trying to get things done prior to starting. Noted that things at home have improved and children had a good start back to school and activities. Is still exploring issues with his  and is working with an  for guidance. Agreed to resume weekly sessions at new time slot.   During this session, this clinician used the following therapeutic modalities: Client-centered Therapy and Cognitive Behavioral Therapy    Substance Abuse was not addressed during this session. If the client is diagnosed with a co-occurring substance use disorder, please indicate any changes in the frequency or amount of use: not discussed. Stage of change for addressing substance use diagnoses: Pre-contemplation    ASSESSMENT:  Emil Jacobs presents with a Euthymic/ normal mood.     his affect is Normal range and intensity, which is congruent, with his mood and the content of the session. The client has made progress on their goals.    Is more focused on personal areas and engaging in less conflict with his spouse. Emil Jacobs presents with a none risk of suicide, none risk of self-harm, and none risk of harm to others.    For any risk assessment that surpasses a \"low\" rating, a safety plan must be developed.    A safety plan was indicated: no  If yes, describe in detail not applicable    PLAN: Between sessions, Emil Jacobs will continue to explore triggers to family conflicts as well as what is working to reduce them. At the next session, the therapist will use " Client-centered Therapy, Cognitive Behavioral Therapy, and Solution-Focused Therapy to address mood regulation.    Behavioral Health Treatment Plan and Discharge Planning: Emil Jacobs is aware of and agrees to continue to work on their treatment plan. They have identified and are working toward their discharge goals. yes    Visit start and stop times:    09/18/24  Start Time: 1400  Stop Time: 1505  Total Visit Time: 65 minutes

## 2024-09-23 ENCOUNTER — OFFICE VISIT (OUTPATIENT)
Dept: PHYSICAL THERAPY | Facility: CLINIC | Age: 49
End: 2024-09-23
Payer: COMMERCIAL

## 2024-09-23 DIAGNOSIS — M76.61 TENDONITIS, ACHILLES, RIGHT: Primary | ICD-10-CM

## 2024-09-23 PROCEDURE — 97110 THERAPEUTIC EXERCISES: CPT | Performed by: PHYSICAL THERAPIST

## 2024-09-24 NOTE — PROGRESS NOTES
"Daily Note     Today's date: 2024  Patient name: Emil Jacobs  : 1975  MRN: [id number]  Referring provider: Berhane Patterson PA-C  Dx:   Encounter Diagnosis     ICD-10-CM    1. Tendonitis, Achilles, right  M76.61                  Subjective:  He notes compliance with stretches and HR protocol at home.      Objective: See treatment diary below      Assessment: Tolerated treatment well. Patient would benefit from continued PT.   He initially performed HR protocol with bilateral LE due to pain above 4/10.  His tendon swelling has improved and his gait is less antalgic with better push off.        Plan: Continue per plan of care.   Dry needling next visit.  Attempt progression  to single leg heel raise if pain level during activity is less than 4/10         Eval/ Re-eval Auth #/ Referral # Total visits Start date  Expiration date Total active units  Total manual units  PT only or  PT+OT?   24 needed 12 v        Total visits remaining 11 10 9 8 7 6 5 4       Total visits used 1 2 3 4 5 6 7 8         Precautions: Standard    Manuals 24   Visit #    STM Gastroc/ soleus/ Tib post  3' 3' 5' 6 min   Stretch soleus gastroc  3' 3' 3' 2 min   TC mobs  2' 2' 2' 2 min           Warm-up        Bike 7' weight 7' bike 7' bike 7 min 8 min   Neuro Re-Ed        SLS        Prostretch Prostretch  10x ea side 10 ea  Prostretch 10  Prostretch 10x Prostretch 10   Soleus stretch on stair   10  8\"     TC post glide with loop   10 blk loop 10 blk loop 10   Alfredson Protocol 2 up one down HR below stair 2x10 ea knee ext and knee flex 2x10 ea knee extended and flexed.  2 up 2 down 2x10 ea bilat HR knee ext and 2x10 knee flexed 2x10 ea 2x10           Ther Ex        Knee ext w df  20  20 --   Rockerboard   BkTB 3x10 fast pace PF     Side step crouch        HR/ TR seated        Leg press 30   95#  " SL 30   95#  SL 30  95#  SL 30  95#  SL 30  95#  SL   Leg press HR 30   55#  SL 30   55#  SL 30  55#  SL 30  55#  SL 30  55#  SL                   Ther Activity                          Access Code: [email or web address]  URL: [email or web address]  Date: 08/29/2024  Prepared by: Bulmaro Giron    Exercises  - Seated Long Arc Quad  - 1 x daily - 7 x weekly - 2 sets - 10 reps

## 2024-09-25 ENCOUNTER — OFFICE VISIT (OUTPATIENT)
Dept: PHYSICAL THERAPY | Facility: CLINIC | Age: 49
End: 2024-09-25
Payer: COMMERCIAL

## 2024-09-25 ENCOUNTER — SOCIAL WORK (OUTPATIENT)
Dept: BEHAVIORAL/MENTAL HEALTH CLINIC | Facility: CLINIC | Age: 49
End: 2024-09-25
Payer: COMMERCIAL

## 2024-09-25 DIAGNOSIS — F90.0 ADHD, PREDOMINANTLY INATTENTIVE TYPE: ICD-10-CM

## 2024-09-25 DIAGNOSIS — M76.61 TENDONITIS, ACHILLES, RIGHT: Primary | ICD-10-CM

## 2024-09-25 DIAGNOSIS — F41.1 GENERALIZED ANXIETY DISORDER: ICD-10-CM

## 2024-09-25 DIAGNOSIS — F33.0 MAJOR DEPRESSIVE DISORDER, RECURRENT, MILD (HCC): Primary | ICD-10-CM

## 2024-09-25 PROCEDURE — 90834 PSYTX W PT 45 MINUTES: CPT | Performed by: COUNSELOR

## 2024-09-25 PROCEDURE — 97530 THERAPEUTIC ACTIVITIES: CPT | Performed by: PHYSICAL THERAPIST

## 2024-09-25 PROCEDURE — 97140 MANUAL THERAPY 1/> REGIONS: CPT | Performed by: PHYSICAL THERAPIST

## 2024-09-25 NOTE — PROGRESS NOTES
Daily Note     Today's date: 2024  Patient name: Emil Jacobs  : 1975  MRN: [id number]  Referring provider: Berhane Patterson PA-C  Dx:   Encounter Diagnosis     ICD-10-CM    1. Tendonitis, Achilles, right  M76.61                  Subjective:  Patient reports some achiness entering treatment.     Objective: See treatment diary below    Assessment: Tolerated treatment well. Pt verbally consented to dry needling treatment session. Slight ache reported post session. Risks/benefits/aftercare instructions reviewed. All questions/concerns addressed.  Pt in prone position. Hand hygiene performed pre and post DN treatment session. Placement of needles in pathological tissue.   B/L Achilles, bilateral gastroc (needle location). E-stim bilateral gastroc.  Total treatment duration to include set up/break down/in situ: 35 minutes. Patient was supervised by clinician throughout entirety of treatment session to include when DN in situ; clinician next to patient. All needles counted upon insertion and removal-- 18 needles. All accounted for and disposed in appropriate sharp container.     No adverse reaction to treatment. Pt advised may experience soreness post session. Pt verbalized understanding.         Plan: Continue per plan of care.   Dry needling next visit.  Attempt progression  to single leg heel raise if pain level during activity is less than 4/10       Eval/ Re-eval Auth #/ Referral # Total visits Start date  Expiration date Total active units  Total manual units  PT only or  PT+OT?   24 needed 12 v  9/ 9/      Total visits remaining 11 10 9 8 7 6 5 4 3      Total visits used 1 2 3 4 5 6 7 8 9        Precautions: Standard    Manuals 24   Visit #    STM Gastroc/ soleus/ Tib post  3' 3' 5' 6 min    Stretch soleus gastroc  3' 3' 3' 2 min    TC mobs  2' 2' 2' 2  "min    Dry needle      15' w/stim            Warm-up         Bike 7' weight 7' bike 7' bike 7 min 8 min    Neuro Re-Ed         SLS         Prostretch Prostretch  10x ea side 10 ea  Prostretch 10  Prostretch 10x Prostretch 10    Soleus stretch on stair   10  8\"      TC post glide with loop   10 blk loop 10 blk loop 10    Alfredson Protocol 2 up one down HR below stair 2x10 ea knee ext and knee flex 2x10 ea knee extended and flexed.  2 up 2 down 2x10 ea bilat HR knee ext and 2x10 knee flexed 2x10 ea 2x10             Ther Ex         Knee ext w df  20  20 --    Rockerboard   BkTB 3x10 fast pace PF      Side step crouch         HR/ TR seated         Leg press 30   95#  SL 30   95#  SL 30  95#  SL 30  95#  SL 30  95#  SL    Leg press HR 30   55#  SL 30   55#  SL 30  55#  SL 30  55#  SL 30  55#  SL                      Ther Activity         Pt ed      FB              Access Code: [email or web address]  URL: [email or web address]  Date: 08/29/2024  Prepared by: Bulmaro Giron    Exercises  - Seated Long Arc Quad  - 1 x daily - 7 x weekly - 2 sets - 10 reps     "

## 2024-09-25 NOTE — TELEPHONE ENCOUNTER
Medication Refill Request     Name of Medication  amphetamine-dextroamphetamine (ADDERALL XR, 20MG,) 20 MG 24 hr capsule   Dose/Frequency      Take 1 capsule (20 mg total) by mouth every morning Max Daily Amount: 20 mg        Quantity 30  Verified pharmacy   [x]  Verified ordering Provider   [x]  Does patient have enough for the next 3 days? Yes [] No [x]  Does patient have a follow-up appointment scheduled? Yes [] No [x]   If so when is appointment:

## 2024-09-25 NOTE — PSYCH
Behavioral Health Psychotherapy Progress Note    Psychotherapy Provided: Individual Psychotherapy     1. Major depressive disorder, recurrent, mild (HCC)        2. Generalized anxiety disorder        3. ADHD, predominantly inattentive type            Goals addressed in session: Goal 1     DATA: Emil was in person for session. Discussed his shelter party and shared some of the tributes he receives. Noted that his wife stepped up and added several touches that made the night special   Also noted they are arguing less and since he has retired. Not sure what is making the difference and will begin to observe what is making things better. Also had a conversation with his work partner who was upset that he is pursuing legal action.  Still planning to work through his  to handle the situation. Requested documentation for a new job that he is pursuing. Signed BRUCE and encouraged him to get one from his med provider as well.  Continues to process thoughts and feelings regarding what is working better with family now that he has less stress from the job.  During this session, this clinician used the following therapeutic modalities: Client-centered Therapy and Cognitive Behavioral Therapy    Substance Abuse was not addressed during this session. If the client is diagnosed with a co-occurring substance use disorder, please indicate any changes in the frequency or amount of use: not addressed. Stage of change for addressing substance use diagnoses: Pre-contemplation    ASSESSMENT:  Emil Jacobs presents with a Euthymic/ normal mood.     his affect is Normal range and intensity, which is congruent, with his mood and the content of the session. The client has made progress on their goals.    Continues to be open about his behavior as well as his reactions to others.  Takes ideas and follows through between sessions as well as except redirection during session to hear reflection or ideas.  Emil Jacobs presents with a  "none risk of suicide, none risk of self-harm, and none risk of harm to others.    For any risk assessment that surpasses a \"low\" rating, a safety plan must be developed.    A safety plan was indicated: no  If yes, describe in detail not applicable    PLAN: Between sessions, Emil Jacobs will continue to identify ways to improve communication and reduce negative reactions at home. At the next session, the therapist will use Client-centered Therapy and Cognitive Behavioral Therapy to address mood regulation.    Behavioral Health Treatment Plan and Discharge Planning: Emil Jacobs is aware of and agrees to continue to work on their treatment plan. They have identified and are working toward their discharge goals. yes    Visit start and stop times:    09/25/24  Start Time: 1401  Stop Time: 1446  Total Visit Time: 45 minutes  "

## 2024-09-26 RX ORDER — DEXTROAMPHETAMINE SACCHARATE, AMPHETAMINE ASPARTATE MONOHYDRATE, DEXTROAMPHETAMINE SULFATE AND AMPHETAMINE SULFATE 5; 5; 5; 5 MG/1; MG/1; MG/1; MG/1
20 CAPSULE, EXTENDED RELEASE ORAL EVERY MORNING
Qty: 30 CAPSULE | Refills: 0 | Status: SHIPPED | OUTPATIENT
Start: 2024-09-26

## 2024-09-27 ENCOUNTER — TELEPHONE (OUTPATIENT)
Dept: PSYCHIATRY | Facility: CLINIC | Age: 49
End: 2024-09-27

## 2024-09-27 NOTE — TELEPHONE ENCOUNTER
Faxed confirmation and scanned in media to [facility] [place] [facility]

## 2024-09-27 NOTE — TELEPHONE ENCOUNTER
NATHALY to let pt know the form was faxed over to the [place] [facility] from [facility]

## 2024-10-02 ENCOUNTER — SOCIAL WORK (OUTPATIENT)
Dept: BEHAVIORAL/MENTAL HEALTH CLINIC | Facility: CLINIC | Age: 49
End: 2024-10-02
Payer: COMMERCIAL

## 2024-10-02 DIAGNOSIS — F41.1 GENERALIZED ANXIETY DISORDER: ICD-10-CM

## 2024-10-02 DIAGNOSIS — F90.0 ADHD, PREDOMINANTLY INATTENTIVE TYPE: ICD-10-CM

## 2024-10-02 DIAGNOSIS — F33.0 MAJOR DEPRESSIVE DISORDER, RECURRENT, MILD (HCC): Primary | ICD-10-CM

## 2024-10-02 PROCEDURE — 90834 PSYTX W PT 45 MINUTES: CPT | Performed by: COUNSELOR

## 2024-10-02 NOTE — BH TREATMENT PLAN
"Outpatient Behavioral Health Psychotherapy Treatment Plan    Emil Jacobs  1975     Date of Initial Psychotherapy Assessment: 2/21/2024   Date of Current Treatment Plan: 10/02/24  Treatment Plan Target Date: 2/21/2025  Treatment Plan Expiration Date: 3/21/2025    Diagnosis:   1. Major depressive disorder, recurrent, mild (HCC)        2. Generalized anxiety disorder        3. ADHD, predominantly inattentive type            Area(s) of Need: Mood regulation &  anxiety management    Long Term Goal 1 (in the client's own words): \" I have developed some ways to manage conflict. I have improved but have a bit to go. I need to let nonsense and condescending comments roll off me better. \" (Partially completed)    Stage of Change: Preparation    Target Date for completion: February 21, 2024     Anticipated therapeutic modalities: client centered, cognitive behavioral, solution focused     People identified to complete this goal: Emil Marciaamanda (client) & Zeinab Saab (therapist)                       Objective 1: (identify the means of measuring success in meeting the objective): Emil will continue to identify and observe patterns in his relationships that create stress and conflict. He will continue to develop  cognitive behavioral as well as communication strategies to reduce his reaction to triggering comments and situations.  He will verbalize his observations & actions in sessions.                     Objective 2: (identify the means of measuring success in meeting the objective): Emil will continue to develop and implement at least two additional strategies to manage his emotions, thoughts and behaviors in these conflicts. He will report success or setbacks in sessions.          Long Term Goal 2 (in the client's own words):  \"I have begun to reduce my worry about things 50 % but I still over think situations. I don't feel as consumed but I still feel the need to manage situations. It is easier to focus with the " "medication. My mood has improved but I still need work in the area .\"  (Partially achieved)     Stage of Change: Preparation     Target Date for completion:  February 21, 2025             Anticipated therapeutic modalities: Client centered, cognitive behavioral, mindfulness strategies             People identified to complete this goal: Emil Guerrero (client) & Zeinab Saab (therapist)                    Objective 1: (identify the means of measuring success in meeting the objective): Emil will continue to observe and identify patterns of worry - how,when, content, and any physical reactions or mood changes. He will verbalize observations in sessions.                    Objective 2: (identify the means of measuring success in meeting the objective): Emil will create and utilize at least two coping strategies to reduce worry and replace negative thoughts. He will report success or roadblocks in sessions.     I am currently under the care of a [facility] psychiatric provider: yes    My [facility] psychiatric provider is: MARKUS Aguilar    I am currently taking psychiatric medications: Yes, as prescribed    I feel that I will be ready for discharge from mental health care when I reach the following (measurable goal/objective): \" I will continue to use therapy as needed to manage my reactions and insecurities.\"    For children and adults who have a legal guardian:   Has there been any change to custody orders and/or guardianship status? NA. If yes, attach updated documentation.    I have updated my Crisis Plan and have been offered a copy of this plan    Behavioral Health Treatment Plan [facility]: Diagnosis and Treatment Plan explained to Emil Jacobs acknowledges an understanding of their diagnosis. Emil Jacobs agrees to this treatment plan.    I have been offered a copy of this Treatment Plan. yes        "

## 2024-10-02 NOTE — PSYCH
"Behavioral Health Psychotherapy Progress Note    Psychotherapy Provided: Individual Psychotherapy     1. Major depressive disorder, recurrent, mild (HCC)        2. Generalized anxiety disorder        3. ADHD, predominantly inattentive type            Goals addressed in session: Goal 1     DATA: Emil was in person for session. Noted he will be starting work next week. Discussed treatment goals and revised treatment plan.  Client has been able to make progress in terms of communication at home and has some reduced stress as a result of retiring from his full-time position.  Has concerns about his  for which he is seeking legal advice and is planning to address in the next few weeks.  During this session, this clinician used the following therapeutic modalities: Client-centered Therapy and Cognitive Behavioral Therapy    Substance Abuse was not addressed during this session. If the client is diagnosed with a co-occurring substance use disorder, please indicate any changes in the frequency or amount of use: not discussed. Stage of change for addressing substance use diagnoses: Pre-contemplation    ASSESSMENT:  Emil Jacobs presents with a Euthymic/ normal mood.     his affect is Normal range and intensity, which is congruent, with his mood and the content of the session. The client has made progress on their goals.    Has reduced amount and frequency of his reactions at home. Will continue to identify triggers and when he feels more inclined to  react or control situations. Emil Jacobs presents with a none risk of suicide, none risk of self-harm, and none risk of harm to others.    For any risk assessment that surpasses a \"low\" rating, a safety plan must be developed.    A safety plan was indicated: no  If yes, describe in detail not applicable    PLAN: Between sessions, Emil Jacobs will continue to focus on improving communication and reducing angry responses. At the next session, the therapist " will use Client-centered Therapy and Cognitive Behavioral Therapy to address mood regulation.    Behavioral Health Treatment Plan and Discharge Planning: Emil Jacobs is aware of and agrees to continue to work on their treatment plan. They have identified and are working toward their discharge goals. yes    Visit start and stop times:    10/02/24  Start Time: 1300  Stop Time: 1345  Total Visit Time: 45 minutes

## 2024-10-09 ENCOUNTER — SOCIAL WORK (OUTPATIENT)
Dept: BEHAVIORAL/MENTAL HEALTH CLINIC | Facility: CLINIC | Age: 49
End: 2024-10-09
Payer: COMMERCIAL

## 2024-10-09 DIAGNOSIS — F33.0 MAJOR DEPRESSIVE DISORDER, RECURRENT, MILD (HCC): Primary | ICD-10-CM

## 2024-10-09 DIAGNOSIS — F90.0 ADHD, PREDOMINANTLY INATTENTIVE TYPE: ICD-10-CM

## 2024-10-09 DIAGNOSIS — M76.61 TENDONITIS, ACHILLES, RIGHT: ICD-10-CM

## 2024-10-09 DIAGNOSIS — F41.1 GENERALIZED ANXIETY DISORDER: ICD-10-CM

## 2024-10-09 PROCEDURE — 90834 PSYTX W PT 45 MINUTES: CPT | Performed by: COUNSELOR

## 2024-10-09 RX ORDER — MELOXICAM 15 MG/1
15 TABLET ORAL DAILY
Qty: 30 TABLET | Refills: 0 | Status: SHIPPED | OUTPATIENT
Start: 2024-10-09 | End: 2024-11-08

## 2024-10-09 NOTE — PSYCH
"Behavioral Health Psychotherapy Progress Note    Psychotherapy Provided: Individual Psychotherapy     1. Major depressive disorder, recurrent, mild (HCC)        2. Generalized anxiety disorder        3. ADHD, predominantly inattentive type            Goals addressed in session: Goal 1     DATA: Emil was in person for session. Was upset as his  created more trouble by going to their  and intimated that there money was not completely accounted for .  has contacted them and is requesting a review immediately. Feels that the partner did this out of spite, as he received the letter from  requesting records from the business that were missing.   Shared hat he was going to meet the . Somewhat anxious about the situation but feels he can account for his earnings and is still pursuing the case with the business. Frustrated with the situation but is following his  lead.   During this session, this clinician used the following therapeutic modalities: Client-centered Therapy and Cognitive Behavioral Therapy    Substance Abuse was not addressed during this session. If the client is diagnosed with a co-occurring substance use disorder, please indicate any changes in the frequency or amount of use: not addressed. Stage of change for addressing substance use diagnoses: Pre-contemplation    ASSESSMENT:  Emil Jcaobs presents with a Euthymic/ normal and Anxious mood.     his affect is Normal range and intensity, which is congruent, with his mood and the content of the session. The client has made progress on their goals.    Is trying to manage his anxiety about the situation with dominance  but is willing to listen to feedback encouraging him to pause and not react.  Emil Jacobs presents with a none risk of suicide, none risk of self-harm, and none risk of harm to others.    For any risk assessment that surpasses a \"low\" rating, a safety plan must be " developed.    A safety plan was indicated: no  If yes, describe in detail not applicable    PLAN: Between sessions, Emil Jacobs will  continue to process his anger and frustration re: his  bujsiness partner without reacting to him directly. . At the next session, the therapist will use Client-centered Therapy and Cognitive Behavioral Therapy to address mood regulation.    Behavioral Health Treatment Plan and Discharge Planning: Emil Jacobs is aware of and agrees to continue to work on their treatment plan. They have identified and are working toward their discharge goals. yes    Visit start and stop times:    10/09/24  Start Time: 1406  Stop Time: 1455  Total Visit Time: 49 minutes

## 2024-10-16 ENCOUNTER — SOCIAL WORK (OUTPATIENT)
Dept: BEHAVIORAL/MENTAL HEALTH CLINIC | Facility: CLINIC | Age: 49
End: 2024-10-16
Payer: COMMERCIAL

## 2024-10-16 DIAGNOSIS — F90.0 ADHD, PREDOMINANTLY INATTENTIVE TYPE: ICD-10-CM

## 2024-10-16 DIAGNOSIS — F33.0 MAJOR DEPRESSIVE DISORDER, RECURRENT, MILD (HCC): Primary | ICD-10-CM

## 2024-10-16 DIAGNOSIS — F41.1 GENERALIZED ANXIETY DISORDER: ICD-10-CM

## 2024-10-16 PROCEDURE — 90834 PSYTX W PT 45 MINUTES: CPT | Performed by: COUNSELOR

## 2024-10-16 NOTE — PSYCH
"Behavioral Health Psychotherapy Progress Note    Psychotherapy Provided: Individual Psychotherapy     1. Major depressive disorder, recurrent, mild (HCC)        2. Generalized anxiety disorder        3. ADHD, predominantly inattentive type            Goals addressed in session: Goal 1     DATA: Emil was in person for session. Discussed meeting with his  and was able to settle the issue and needs to p[ay more in taxes but has rectified the situation on his end. Had another conversation with his  who now has to be accountable for his own end of the money that he owes. Still has not received the requested documents which have now hemanth promised. Hoping to see an end to the situation soon. Has started his new job and continues to be peaceful at home with no arguments with spouse. Feels that leaving his former job and reducing work hours has contributed to a reduction in stress and therefore an improvement in his mood overall.   During this session, this clinician used the following therapeutic modalities: Client-centered Therapy and Cognitive Behavioral Therapy    Substance Abuse was not addressed during this session. If the client is diagnosed with a co-occurring substance use disorder, please indicate any changes in the frequency or amount of use: not discussed. Stage of change for addressing substance use diagnoses: Pre-contemplation    ASSESSMENT:  Emil Jacobs presents with a Euthymic/ normal mood.     his affect is Normal range and intensity, which is congruent, with his mood and the content of the session. The client has made progress on their goals.    Continues to process his feelings and is beginning to examine his need for control in certain situations. Emil Jacobs presents with a none risk of suicide, none risk of self-harm, and none risk of harm to others.    For any risk assessment that surpasses a \"low\" rating, a safety plan must be developed.    A safety plan was indicated: " no  If yes, describe in detail not applicable    PLAN: Between sessions, Emil Jacobs will continue to focus on assertive communication and reduction of stress. At the next session, the therapist will use Client-centered Therapy and Cognitive Behavioral Therapy to address mood regulation.    Behavioral Health Treatment Plan and Discharge Planning: Emil Jacobs is aware of and agrees to continue to work on their treatment plan. They have identified and are working toward their discharge goals. yes    Visit start and stop times:    10/16/24  Start Time: 1407  Stop Time: 1455  Total Visit Time: 48 minutes

## 2024-10-23 ENCOUNTER — SOCIAL WORK (OUTPATIENT)
Dept: BEHAVIORAL/MENTAL HEALTH CLINIC | Facility: CLINIC | Age: 49
End: 2024-10-23
Payer: COMMERCIAL

## 2024-10-23 DIAGNOSIS — F33.0 MAJOR DEPRESSIVE DISORDER, RECURRENT, MILD (HCC): ICD-10-CM

## 2024-10-23 DIAGNOSIS — F90.0 ADHD, PREDOMINANTLY INATTENTIVE TYPE: ICD-10-CM

## 2024-10-23 DIAGNOSIS — F41.1 GENERALIZED ANXIETY DISORDER: Primary | ICD-10-CM

## 2024-10-23 PROCEDURE — 90834 PSYTX W PT 45 MINUTES: CPT | Performed by: COUNSELOR

## 2024-10-23 NOTE — PSYCH
"Behavioral Health Psychotherapy Progress Note    Psychotherapy Provided: Individual Psychotherapy     1. Generalized anxiety disorder        2. Major depressive disorder, recurrent, mild (HCC)        3. ADHD, predominantly inattentive type            Goals addressed in session: Goal 1     DATA: Emil was in person for session. Starting new job and continues to focus on settling his tax and partnership concerns. Noted that  were filing motions on his behalf. Shared that his partner was not forthcoming with information - gave some receipts but still not adding up to the full amount. Hopeful that he can disolve the partnership. Reports that children are doing well in school and relationship with his wife is better.   During this session, this clinician used the following therapeutic modalities: Client-centered Therapy and Cognitive Behavioral Therapy    Substance Abuse was not addressed during this session. If the client is diagnosed with a co-occurring substance use disorder, please indicate any changes in the frequency or amount of use: not addressed. Stage of change for addressing substance use diagnoses: Pre-contemplation    ASSESSMENT:  Emil Jacobs presents with a Euthymic/ normal mood.     his affect is Normal range and intensity, which is congruent, with his mood and the content of the session. The client has made progress on their goals.    Family relationships have improved - some evidence of impulsivity in his handling of the partner relationship.Observed tendency to be controlling and react abruptly.   Emil Jacobs presents with a none risk of suicide, none risk of self-harm, and none risk of harm to others.    For any risk assessment that surpasses a \"low\" rating, a safety plan must be developed.    A safety plan was indicated: no  If yes, describe in detail not applicable    PLAN: Between sessions, Emil Jacobs will continue to focus on balancing work and family and reduce mood lability. At the " next session, the therapist will use Client-centered Therapy and Cognitive Behavioral Therapy to address mood regulation.    Behavioral Health Treatment Plan and Discharge Planning: Emil Jacobs is aware of and agrees to continue to work on their treatment plan. They have identified and are working toward their discharge goals. yes    Visit start and stop times:    10/23/24  Start Time: 1404  Stop Time: 1450  Total Visit Time: 46 minutes

## 2024-11-06 ENCOUNTER — TELEPHONE (OUTPATIENT)
Age: 49
End: 2024-11-06

## 2024-11-06 NOTE — TELEPHONE ENCOUNTER
Patient is calling regarding cancelling an appointment.    Date/Time: 11/6/24    Reason: personal emergency    Patient was rescheduled: YES [] NO [x]  If yes, when was Patient reschedule for: 11/13 next appt    Patient requesting call back to reschedule: YES [] NO [x]

## 2024-11-12 DIAGNOSIS — F90.0 ADHD, PREDOMINANTLY INATTENTIVE TYPE: ICD-10-CM

## 2024-11-12 NOTE — TELEPHONE ENCOUNTER
Patient is calling regarding cancelling an appointment.    Date/Time: 11/13/2024 at 2 pm    Reason: pts work schedule has changed and Wednesdays do not work any more    Patient was rescheduled: YES [] NO [x]  If yes, when was Patient reschedule for: n/a    Patient requesting call back to reschedule: YES [x] NO []

## 2024-11-12 NOTE — TELEPHONE ENCOUNTER
Medication Refill Request     Name of Medication ADDERALL XR  Dose/Frequency 20 mg/ Take 1 capsule by mouth every morning.  Quantity 30  Verified pharmacy   [x]  Verified ordering Provider   [x]  Does patient have enough for the next 3 days? Yes [] No [x]  Does patient have a follow-up appointment scheduled? Yes [] No [x]   If so when is appointment: Patients work schedule has changed when he gets it he will call back to schedule.

## 2024-11-13 RX ORDER — DEXTROAMPHETAMINE SACCHARATE, AMPHETAMINE ASPARTATE MONOHYDRATE, DEXTROAMPHETAMINE SULFATE AND AMPHETAMINE SULFATE 5; 5; 5; 5 MG/1; MG/1; MG/1; MG/1
20 CAPSULE, EXTENDED RELEASE ORAL EVERY MORNING
Qty: 30 CAPSULE | Refills: 0 | Status: SHIPPED | OUTPATIENT
Start: 2024-11-13

## 2024-11-18 DIAGNOSIS — M76.61 TENDONITIS, ACHILLES, RIGHT: ICD-10-CM

## 2024-11-19 RX ORDER — MELOXICAM 15 MG/1
15 TABLET ORAL DAILY
Qty: 30 TABLET | Refills: 5 | Status: SHIPPED | OUTPATIENT
Start: 2024-11-19 | End: 2024-12-19

## 2024-11-20 ENCOUNTER — SOCIAL WORK (OUTPATIENT)
Dept: BEHAVIORAL/MENTAL HEALTH CLINIC | Facility: CLINIC | Age: 49
End: 2024-11-20
Payer: COMMERCIAL

## 2024-11-20 DIAGNOSIS — F33.0 MAJOR DEPRESSIVE DISORDER, RECURRENT, MILD (HCC): Primary | ICD-10-CM

## 2024-11-20 DIAGNOSIS — F41.1 GENERALIZED ANXIETY DISORDER: ICD-10-CM

## 2024-11-20 DIAGNOSIS — F90.0 ADHD, PREDOMINANTLY INATTENTIVE TYPE: ICD-10-CM

## 2024-11-20 PROCEDURE — 90834 PSYTX W PT 45 MINUTES: CPT | Performed by: COUNSELOR

## 2024-11-20 NOTE — PSYCH
Behavioral Health Psychotherapy Progress Note    Psychotherapy Provided: Individual Psychotherapy     1. Major depressive disorder, recurrent, mild (HCC)        2. Generalized anxiety disorder        3. ADHD, predominantly inattentive type            Goals addressed in session: Goal 1     DATA: Emil was in person.  Noted that he tried to call to cancel session but I shared that it was not received by me.  He he stated the date and upon  further discovery the cancellation was infused in a prescription refill message and was not received by me.  Shared how he was working on a new job and needed to change his schedule again but that he was enjoying the position.  Also noted that he was getting ready to go hunting with his father over Thanksgiving and they were rebuilding their dear stands in order to prepare for the weekend.  Going over Thanksgiving holiday to [facility].  Reviewed the situation with his former partner and noted that it is not settled but court papers are filed and they will still be progressing in whatever way it needs to happen to gain a settlement.  Feels that he has lost respect for him and is frustrated.  Also noted that his children will be starting the wrestling season and they will be in the throes of that postholiday.  Overall things are going well but he is still frustrated and angry at the betrayal from his partner.  During this session, this clinician used the following therapeutic modalities: Client-centered Therapy and Cognitive Behavioral Therapy    Substance Abuse was not addressed during this session. If the client is diagnosed with a co-occurring substance use disorder, please indicate any changes in the frequency or amount of use: Not addressed. Stage of change for addressing substance use diagnoses: Pre-contemplation    ASSESSMENT:  Emil Jacobs presents with a Euthymic/ normal and Anxious mood.     his affect is Normal range and intensity, which is congruent, with his mood  "and the content of the session. The client has made progress on their goals.    Appears to need to be very busy and moving at all times.  We have not addressed ADHD as much as the regular situations which he faces.  Appears that conflicts at home have decreased so we will begin to focus on his need to be busy and energized at all times in subsequent sessions.  Emil Jacobs presents with a none risk of suicide, none risk of self-harm, and none risk of harm to others.    For any risk assessment that surpasses a \"low\" rating, a safety plan must be developed.    A safety plan was indicated: no  If yes, describe in detail not applicable    PLAN: Between sessions, Emil Jacobs will begin to identify patterns of work and increased activity. At the next session, the therapist will use Client-centered Therapy, Cognitive Behavioral Therapy, Cognitive Processing Therapy, and Supportive Psychotherapy to address mood regulation and anxiety management.    Behavioral Health Treatment Plan and Discharge Planning: Emil Jacobs is aware of and agrees to continue to work on their treatment plan. They have identified and are working toward their discharge goals. yes    Visit start and stop times:    11/20/24  Start Time: 1405  Stop Time: 1450  Total Visit Time: 45 minutes  "

## 2024-12-06 ENCOUNTER — TELEPHONE (OUTPATIENT)
Dept: PSYCHIATRY | Facility: CLINIC | Age: 49
End: 2024-12-06

## 2024-12-06 ENCOUNTER — SOCIAL WORK (OUTPATIENT)
Dept: BEHAVIORAL/MENTAL HEALTH CLINIC | Facility: CLINIC | Age: 49
End: 2024-12-06
Payer: COMMERCIAL

## 2024-12-06 DIAGNOSIS — F33.0 MAJOR DEPRESSIVE DISORDER, RECURRENT, MILD (HCC): Primary | ICD-10-CM

## 2024-12-06 DIAGNOSIS — F90.0 ADHD, PREDOMINANTLY INATTENTIVE TYPE: ICD-10-CM

## 2024-12-06 DIAGNOSIS — F41.1 GENERALIZED ANXIETY DISORDER: ICD-10-CM

## 2024-12-06 PROCEDURE — 90832 PSYTX W PT 30 MINUTES: CPT | Performed by: COUNSELOR

## 2024-12-06 NOTE — TELEPHONE ENCOUNTER
Called to see if pt wants to come in at 10:00 am today 12/6/2024 due to no showing his 9:00 am appt with Zeinab Saab

## 2024-12-06 NOTE — PSYCH
"Behavioral Health Psychotherapy Progress Note    Psychotherapy Provided: Individual Psychotherapy     1. Major depressive disorder, recurrent, mild (HCC)        2. ADHD, predominantly inattentive type        3. Generalized anxiety disorder            Goals addressed in session: Goal 1     DATA: Emil was in person for session - noted he was late due to oversleeping. Discussed his recent hunting trip , Thanksgiving Day, and began to discuss his son's drinking. States he doesn't condone it but also doesn't punish him severely. Noted that he may be giving mixed messages.  During this session, this clinician used the following therapeutic modalities: Client-centered Therapy and Cognitive Behavioral Therapy    Substance Abuse was not addressed during this session. If the client is diagnosed with a co-occurring substance use disorder, please indicate any changes in the frequency or amount of use: noted that we stated we were going to address his own use of alcohol and will begin next session. Stage of change for addressing substance use diagnoses: Pre-contemplation    ASSESSMENT:  Emil Jacobs presents with a Euthymic/ normal mood.     his affect is Normal range and intensity, which is congruent, with his mood and the content of the session. The client has made progress on their goals.    Has not settled into new schedule with work. Noted family relationships are improved. Emil Jacobs presents with a none risk of suicide, none risk of self-harm, and none risk of harm to others.    For any risk assessment that surpasses a \"low\" rating, a safety plan must be developed.    A safety plan was indicated: no  If yes, describe in detail none    PLAN: Between sessions, Emil Jacobs will begin to explore use of alcohol and excessive \"busyness\". At the next session, the therapist will use Client-centered Therapy and Cognitive Behavioral Therapy to address mood regulation.    Behavioral Health Treatment Plan and Discharge " Planning: Emil Jacobs is aware of and agrees to continue to work on their treatment plan. They have identified and are working toward their discharge goals. yes    Visit start and stop times:    12/06/24  Start Time: 1031  Stop Time: 1105  Total Visit Time: 34 minutes

## 2024-12-09 ENCOUNTER — SOCIAL WORK (OUTPATIENT)
Dept: BEHAVIORAL/MENTAL HEALTH CLINIC | Facility: CLINIC | Age: 49
End: 2024-12-09
Payer: COMMERCIAL

## 2024-12-09 ENCOUNTER — OFFICE VISIT (OUTPATIENT)
Dept: UROLOGY | Facility: CLINIC | Age: 49
End: 2024-12-09
Payer: COMMERCIAL

## 2024-12-09 VITALS
SYSTOLIC BLOOD PRESSURE: 160 MMHG | HEART RATE: 110 BPM | BODY MASS INDEX: 39.05 KG/M2 | OXYGEN SATURATION: 95 % | DIASTOLIC BLOOD PRESSURE: 80 MMHG | HEIGHT: 71 IN

## 2024-12-09 DIAGNOSIS — F41.1 GENERALIZED ANXIETY DISORDER: ICD-10-CM

## 2024-12-09 DIAGNOSIS — Z12.5 SCREENING FOR PROSTATE CANCER: Primary | ICD-10-CM

## 2024-12-09 DIAGNOSIS — F90.0 ADHD, PREDOMINANTLY INATTENTIVE TYPE: ICD-10-CM

## 2024-12-09 DIAGNOSIS — F33.0 MAJOR DEPRESSIVE DISORDER, RECURRENT, MILD (HCC): Primary | ICD-10-CM

## 2024-12-09 DIAGNOSIS — R68.82 LIBIDO, DECREASED: ICD-10-CM

## 2024-12-09 PROCEDURE — 99213 OFFICE O/P EST LOW 20 MIN: CPT

## 2024-12-09 PROCEDURE — 90834 PSYTX W PT 45 MINUTES: CPT | Performed by: COUNSELOR

## 2024-12-09 RX ORDER — TADALAFIL 20 MG/1
20 TABLET ORAL DAILY PRN
Qty: 30 TABLET | Refills: 5 | Status: SHIPPED | OUTPATIENT
Start: 2024-12-09

## 2024-12-09 NOTE — PROGRESS NOTES
Office Visit- Urology  Emil Jacobs 1975 MRN: [id number]      Assessment/Discussion/Plan    49 y.o. adult managed by     Hypogonadism  -Patient utilizing testosterone cypionate 50 mg twice per week  -[facility]  At time of visit did not yet have results. Will call with results     2. ED  -utilizes cilias 20 mg PRN    3.Prostate Cancer Screening  -Last PSA was 0.5 in September 2023\  -due for updated PSA  -Patient defers prostate examination in office today      Chief Complaint:   Emil is a 49 y.o. adult presenting to the office for a follow up visit regarding hypogonadism        Subjective    Patient is a 49-year-old male who presents to the office today for follow-up in regards to hypergonadism he was last seen for initial consultation with Dr. Cho in April 2024 and was initiated on testosterone cypionate 50 mg twice per week.  He states that he has been feeling significantly better on testosterone as he previously did not have energy and had increased apathy and difficulty with weight loss.  He wishes to continue with therapy.  He  he went for lab work today but these results did not come back yet Cialis working okay as well      ROS:   Review of Systems   Constitutional: Negative.  Negative for chills, fatigue and fever.   HENT: Negative.     Respiratory:  Negative for shortness of breath.    Cardiovascular:  Negative for chest pain.   Gastrointestinal: Negative.  Negative for abdominal pain.   Endocrine: Negative.    Musculoskeletal: Negative.    Skin: Negative.    Neurological: Negative.  Negative for dizziness and light-headedness.   Hematological: Negative.    Psychiatric/Behavioral: Negative.           Past Medical History  Past Medical History:   Diagnosis Date    Chronic pain disorder     lumbar     CPAP (continuous positive airway pressure) dependence     GERD (gastroesophageal reflux disease)     diet controlled    Helicobacter pylori gastritis  2017    Sleep apnea     does not wear CPAP       Past Surgical History  Past Surgical History:   Procedure Laterality Date    COLONOSCOPY N/A 2017    Procedure: COLONOSCOPY;  Surgeon: Lacey Dennis MD;  Location: [facility] GI LAB;  Service:     ESOPHAGOGASTRODUODENOSCOPY N/A 2017    Procedure: ESOPHAGOGASTRODUODENOSCOPY (EGD);  Surgeon: Lacey Dennis MD;  Location: [facility] GI LAB;  Service:     FL INJECTION RIGHT SHOULDER (ARTHROGRAM)  2024    KNEE ARTHROSCOPY Left     x4 and ACL repair    KNEE ARTHROSCOPY Right     x2 and ACL repair    KNEE ARTHROSCOPY W/ AUTOGENOUS CARTILAGE IMPLANTATION (ACI) PROCEDURE Left     SINUS SURGERY      TONSILLECTOMY      UPPER GASTROINTESTINAL ENDOSCOPY         Past Family History  Family History   Problem Relation Age of Onset    Breast cancer Mother     No Known Problems Father     Cancer Sister         breast    Breast cancer Sister     Liver cancer Maternal Grandmother     Colon cancer Paternal Grandmother     Mental illness Neg Hx     Diabetes Neg Hx        Past Social history  Social History     Socioeconomic History    Marital status: /Civil Union     Spouse name: Not on file    Number of children: Not on file    Years of education: Not on file    Highest education level: Not on file   Occupational History    Not on file   Tobacco Use    Smoking status: Former     Current packs/day: 0.00     Average packs/day: 0.5 packs/day for 5.0 years (2.5 ttl pk-yrs)     Types: Cigarettes     Start date:      Quit date:      Years since quittin.9    Smokeless tobacco: Never   Vaping Use    Vaping status: Never Used   Substance and Sexual Activity    Alcohol use: Yes     Comment: daily    Drug use: No    Sexual activity: Not on file   Other Topics Concern    Not on file   Social History Narrative    Not on file     Social Drivers of Health     Financial Resource Strain: Not on file   Food Insecurity: Not on file   Transportation Needs: Not on file   Physical  "Activity: Not on file   Stress: Not on file   Social Connections: Not on file   Intimate Partner Violence: Not on file   Housing Stability: Not on file       Current Medications  Current Outpatient Medications   Medication Sig Dispense Refill    amphetamine-dextroamphetamine (ADDERALL XR, 20MG,) 20 MG 24 hr capsule Take 1 capsule (20 mg total) by mouth every morning Max Daily Amount: 20 mg 30 capsule 0    meloxicam (MOBIC) 15 mg tablet TAKE 1 TABLET (15 MG TOTAL) BY MOUTH DAILY. 30 tablet 5    tadalafil (CIALIS) 20 MG tablet Take 1 tablet (20 mg total) by mouth daily as needed for erectile dysfunction 30 tablet 5    testosterone cypionate (DEPO-TESTOSTERONE) 200 mg/mL SOLN INJECT 0.25 ML (50 MG TOTAL) INTO A MUSCLE 2 (TWO) TIMES A WEEK 10 mL 0    valsartan (DIOVAN) 80 mg tablet Take 80 mg by mouth every morning      ciprofloxacin-dexamethasone (CIPRODEX) otic suspension Administer 4 drops into the left ear 2 (two) times a day for 10 days 7.5 mL 0    ofloxacin (FLOXIN) 0.3 % otic solution Use 4 gtts to left ear bid x 7 days prn drainage (Patient not taking: Reported on 7/3/2024) 5 mL 0    SYRINGE/NEEDLE, DISP, 1 ML 25G X 5/8\" 1 ML MISC Use once a week (Patient not taking: Reported on 7/3/2024) 15 each 3     No current facility-administered medications for this visit.       Allergies  Allergies   Allergen Reactions    Doxycycline Itching    Tetracycline Rash     Severe itching       OBJECTIVE    Vitals   Vitals:    12/09/24 1545   BP: 160/80   BP Location: Left arm   Patient Position: Sitting   Cuff Size: Standard   Pulse: (!) 110   SpO2: 95%   Height: 5' 11\" (1.803 m)       PVR:    Physical Exam  Constitutional:       General: He is not in acute distress.     Appearance: Normal appearance. He is normal weight. He is not ill-appearing or toxic-appearing.   HENT:      Head: Normocephalic and atraumatic.   Eyes:      Conjunctiva/sclera: Conjunctivae normal.   Cardiovascular:      Rate and Rhythm: Normal rate. " "  Pulmonary:      Effort: Pulmonary effort is normal. No respiratory distress.   Skin:     General: Skin is warm and dry.   Neurological:      General: No focal deficit present.      Mental Status: He is alert and oriented to person, place, and time.      Cranial Nerves: No cranial nerve deficit.   Psychiatric:         Mood and Affect: Mood normal.         Behavior: Behavior normal.         Thought Content: Thought content normal.       Labs:     Lab Results   Component Value Date    PSA 0.5 09/27/2023     Lab Results   Component Value Date    CREATININE 0.98 11/30/2023      No results found for: \"HGBA1C\"  Lab Results   Component Value Date    CALCIUM 9.5 11/30/2023    K 3.8 11/30/2023    CO2 25 11/30/2023     11/30/2023    BUN 17 11/30/2023    CREATININE 0.98 11/30/2023       I have personally reviewed all pertinent lab results and reviewed with patient    Imaging       Freddy Hartley PA-C  Date: 12/9/2024 Time: 3:49 PM  [facility]    This note was written using fluency dictation software. Please excuse any resulting minor grammatical errors.      "

## 2024-12-09 NOTE — PSYCH
Behavioral Health Psychotherapy Progress Note    Psychotherapy Provided: Individual Psychotherapy     1. Major depressive disorder, recurrent, mild (HCC)        2. Generalized anxiety disorder        3. ADHD, predominantly inattentive type            Goals addressed in session: Goal 1     DATA: Emil was in person for session. Discussed week's events. Completed depression, anxiety, and alcohol use screenings. Noted that he does drink often and sees it as a way to relax and unwind. Discussed pattern of use and how it may relate to impulsive behavior.  Also noted that his wife has challenged him about his drinking several times but does note that he drinks with her.  Also blamed his father-in-law as he states his father-in-law will come over so that he can drink and engages Emil in doing so so that he can say he is doing it with him.  Became defensive about being labeled as an alcoholic.  Noted that we were using screening tools and that the score of his screening tool demonstrated further reflection and review about his behaviors.  We will continue to discuss his need for control and constant movement in subsequent sessions  During this session, this clinician used the following therapeutic modalities: Client-centered Therapy and Cognitive Behavioral Therapy    Substance Abuse was addressed during this session. If the client is diagnosed with a co-occurring substance use disorder, please indicate any changes in the frequency or amount of use: See above. Stage of change for addressing substance use diagnoses: Pre-contemplation    ASSESSMENT:  Emil Jacobs presents with a Euthymic/ normal and Anxious mood.     his affect is Normal range and intensity, which is congruent, with his mood and the content of the session. The client has made progress on their goals.    While he presented with some resistance and deflection during this session he was amenable to having a conversation about his behaviors and was honest about  "the amount and frequency of use.  Was challenged about how he counts his drinks noting that he says he has 2 or 3 yet is doing a double pour on each 1.  Emil Jacobs presents with a none risk of suicide, none risk of self-harm, and none risk of harm to others.    For any risk assessment that surpasses a \"low\" rating, a safety plan must be developed.    A safety plan was indicated: no  If yes, describe in detail not applicable    PLAN: Between sessions, Emil Jacobs will continue to explore contributing factors to potential conflicts at home and need for constant motion in his daily life. At the next session, the therapist will use Client-centered Therapy, Cognitive Behavioral Therapy, and alcohol counseling and education  to address mood regulation.    Behavioral Health Treatment Plan and Discharge Planning: Emil Jacobs is aware of and agrees to continue to work on their treatment plan. They have identified and are working toward their discharge goals. yes    Visit start and stop times:    12/09/24  Start Time: 1110  Stop Time: 1155  Total Visit Time: 45 minutes  "

## 2024-12-10 LAB
BASOPHILS # BLD AUTO: 0 X10E3/UL (ref 0–0.2)
BASOPHILS NFR BLD AUTO: 1 %
EOSINOPHIL # BLD AUTO: 0.3 X10E3/UL (ref 0–0.4)
EOSINOPHIL NFR BLD AUTO: 4 %
ERYTHROCYTE [DISTWIDTH] IN BLOOD BY AUTOMATED COUNT: 12.5 % (ref 11.6–15.4)
HCT VFR BLD AUTO: 54.1 % (ref 37.5–51)
HGB BLD-MCNC: 18.1 G/DL (ref 13–17.7)
IMM GRANULOCYTES # BLD: 0 X10E3/UL (ref 0–0.1)
IMM GRANULOCYTES NFR BLD: 1 %
LYMPHOCYTES # BLD AUTO: 2.2 X10E3/UL (ref 0.7–3.1)
LYMPHOCYTES NFR BLD AUTO: 35 %
MCH RBC QN AUTO: 30.9 PG (ref 26.6–33)
MCHC RBC AUTO-ENTMCNC: 33.5 G/DL (ref 31.5–35.7)
MCV RBC AUTO: 93 FL (ref 79–97)
MONOCYTES # BLD AUTO: 0.7 X10E3/UL (ref 0.1–0.9)
MONOCYTES NFR BLD AUTO: 10 %
NEUTROPHILS # BLD AUTO: 3.2 X10E3/UL (ref 1.4–7)
NEUTROPHILS NFR BLD AUTO: 49 %
PLATELET # BLD AUTO: 227 X10E3/UL (ref 150–450)
RBC # BLD AUTO: 5.85 X10E6/UL (ref 4.14–5.8)
TESTOST FREE SERPL-MCNC: 18.5 PG/ML (ref 7.2–24)
TESTOST SERPL-MCNC: 537 NG/DL (ref 264–916)
WBC # BLD AUTO: 6.4 X10E3/UL (ref 3.4–10.8)

## 2024-12-11 ENCOUNTER — TELEPHONE (OUTPATIENT)
Dept: UROLOGY | Facility: AMBULATORY SURGERY CENTER | Age: 49
End: 2024-12-11

## 2024-12-11 NOTE — TELEPHONE ENCOUNTER
Fax received from [facility] and scanned into  results for CBC/TESTOSTERONE/FREE AND TOTAL/VENIPUNCTURE.

## 2024-12-16 ENCOUNTER — TELEPHONE (OUTPATIENT)
Dept: UROLOGY | Facility: CLINIC | Age: 49
End: 2024-12-16

## 2024-12-16 DIAGNOSIS — E29.1 HYPOGONADISM IN MALE: Primary | ICD-10-CM

## 2024-12-16 NOTE — TELEPHONE ENCOUNTER
hemoglobin came back at 18.1 hematocrit at 54.1.  This is too high.  Patient should take a testosterone holiday for 3 weeks and obtain an updated CBC in 3 please review ER parameters such as chest pain, difficulty breathing, dizziness or lightheadedness, headache, vision change.  We can certainly restart testosterone but we will have to start at a lower dose.  Please confirm dosage with patient that he is currently utilizing.  Please also obtain updated testosterone level as it was only pending 1 labs were collected

## 2024-12-18 NOTE — TELEPHONE ENCOUNTER
"Spoke to pt. He says he has been taking \"a little more\" than his prescribed .25mL dosage.     Pt has stopped taking the testosterone for a few days ever since he saw that his H and H were elevated. He will stop taking the medication for 3 weeks and get repeated labs after the 3 weeks.     Pt did ask what would happen if his hemoglobin and hematocrit do not go down, it was explained that we usually tell patients to donate blood. Pt said he may do this anyway just in case.     Pt will get updated testosterone level and then repeat cbc in 3 weeks. He verbalized understanding of all instructions. ER precautions reviewed.  "

## 2024-12-27 ENCOUNTER — TELEPHONE (OUTPATIENT)
Age: 49
End: 2024-12-27

## 2024-12-27 NOTE — TELEPHONE ENCOUNTER
Patient is calling regarding cancelling an appointment.    Date/Time: 12/27/20244pm    Reason: patient is sick    Patient was rescheduled: YES [] NO [x]  If yes, when was Patient reschedule for: patient will be at next scheduled visit    Patient requesting call back to reschedule: YES [] NO [x]

## 2025-01-10 ENCOUNTER — OFFICE VISIT (OUTPATIENT)
Dept: FAMILY MEDICINE CLINIC | Facility: CLINIC | Age: 50
End: 2025-01-10
Payer: COMMERCIAL

## 2025-01-10 VITALS
DIASTOLIC BLOOD PRESSURE: 90 MMHG | SYSTOLIC BLOOD PRESSURE: 152 MMHG | RESPIRATION RATE: 18 BRPM | BODY MASS INDEX: 38.89 KG/M2 | WEIGHT: 277.8 LBS | HEIGHT: 71 IN | HEART RATE: 72 BPM | TEMPERATURE: 97.4 F

## 2025-01-10 DIAGNOSIS — F90.0 ADHD, PREDOMINANTLY INATTENTIVE TYPE: ICD-10-CM

## 2025-01-10 DIAGNOSIS — E78.2 MIXED HYPERLIPIDEMIA: ICD-10-CM

## 2025-01-10 DIAGNOSIS — I10 BENIGN ESSENTIAL HYPERTENSION: Primary | ICD-10-CM

## 2025-01-10 DIAGNOSIS — H91.92 DECREASED HEARING OF LEFT EAR: ICD-10-CM

## 2025-01-10 PROCEDURE — 99214 OFFICE O/P EST MOD 30 MIN: CPT | Performed by: FAMILY MEDICINE

## 2025-01-10 RX ORDER — VALSARTAN 40 MG/1
40 TABLET ORAL DAILY
Qty: 90 TABLET | Refills: 1 | Status: SHIPPED | OUTPATIENT
Start: 2025-01-10 | End: 2025-07-09

## 2025-01-10 NOTE — PROGRESS NOTES
Name: Emil Jacobs      : 1975      MRN: [id number]  Encounter Provider: Frank Lombardi, DO  Encounter Date: 1/10/2025   Encounter department: [facility]  :  Assessment & Plan  Benign essential hypertension  Pt wants to stay on diovan, would suggest norvasc - will follow bp in 8 weeks at physical    Orders:  •  valsartan (DIOVAN) 40 mg tablet; Take 1 tablet (40 mg total) by mouth daily  •  Ambulatory Referral to Cardiology; Future    Mixed hyperlipidemia  Suggest crestor - will get labs for pt to get prior to his appt  Orders:  •  Comprehensive metabolic panel; Future  •  Lipid Panel with Direct LDL reflex; Future    Decreased hearing of left ear  Pt had Typ tubes and states they were recently taken out. The defect is still present with debris in the defect -= pt advised to make a follow up with ent              History of Present Illness     Pt is sched for a same day appt to discuss high BP  Pt states he donated blood and his BP was high  Pt states he has not been taking his BP meds for months    Pt states he wants to see a cardiologist    Pt states sever month ago pt had a tube placed and states he subsequently had the tbe removed as it was clogged,.  Would like me to look at it  Ear feels clogged        Review of Systems   Constitutional:  Negative for activity change, appetite change, chills, diaphoresis, fatigue, fever and unexpected weight change.   HENT:  Negative for congestion, dental problem, ear pain, mouth sores, sinus pressure, sinus pain, sore throat and trouble swallowing.         Decreased hearing   Eyes:  Negative for photophobia, discharge and itching.   Respiratory:  Negative for apnea, chest tightness and shortness of breath.    Cardiovascular:  Negative for chest pain, palpitations and leg swelling.   Gastrointestinal:  Negative for abdominal distention, abdominal pain, blood in stool, nausea and vomiting.   Endocrine: Negative for cold intolerance, heat intolerance,  "polydipsia, polyphagia and polyuria.   Genitourinary:  Negative for difficulty urinating.   Musculoskeletal:  Negative for arthralgias.   Skin:  Negative for color change and wound.   Neurological:  Negative for dizziness, syncope, speech difficulty and headaches.   Hematological:  Negative for adenopathy.   Psychiatric/Behavioral:  Negative for agitation and behavioral problems.        Objective   /90   Pulse 72   Temp (!) 97.4 °F (36.3 °C)   Resp 18   Ht 5' 11\" (1.803 m)   Wt 126 kg (277 lb 12.8 oz)   BMI 38.75 kg/m²      Physical Exam  HENT:      Head:      Comments: Defect in left TM  Debris present in defect        "

## 2025-01-10 NOTE — ASSESSMENT & PLAN NOTE
Suggest crestor - will get labs for pt to get prior to his appt  Orders:  •  Comprehensive metabolic panel; Future  •  Lipid Panel with Direct LDL reflex; Future

## 2025-01-10 NOTE — TELEPHONE ENCOUNTER
Reason for call:   [x] Refill   [] Prior Auth  [] Other:     Office:   [] PCP/Provider -   [x] Specialty/Provider -     Medication: amphetamine-dextroamphetamine (ADDERALL XR, 20MG,) 20 MG 24 hr capsule Take 1 capsule (20 mg total) by mouth every morning       Pharmacy: [facility] [id number] [address]      Does the patient have enough for 3 days?   [x] Yes   [] No - Send as HP to POD

## 2025-01-10 NOTE — ASSESSMENT & PLAN NOTE
Pt wants to stay on diovan, would suggest norvasc - will follow bp in 8 weeks at physical    Orders:  •  valsartan (DIOVAN) 40 mg tablet; Take 1 tablet (40 mg total) by mouth daily  •  Ambulatory Referral to Cardiology; Future

## 2025-01-13 RX ORDER — DEXTROAMPHETAMINE SACCHARATE, AMPHETAMINE ASPARTATE MONOHYDRATE, DEXTROAMPHETAMINE SULFATE AND AMPHETAMINE SULFATE 5; 5; 5; 5 MG/1; MG/1; MG/1; MG/1
20 CAPSULE, EXTENDED RELEASE ORAL EVERY MORNING
Qty: 30 CAPSULE | Refills: 0 | Status: SHIPPED | OUTPATIENT
Start: 2025-01-13

## 2025-01-13 NOTE — TELEPHONE ENCOUNTER
Called patient per request of provider and patient now scheduled for 1/15/2025 with Tessa Rojas PA-C virtually.

## 2025-01-15 ENCOUNTER — TELEMEDICINE (OUTPATIENT)
Dept: PSYCHIATRY | Facility: CLINIC | Age: 50
End: 2025-01-15
Payer: COMMERCIAL

## 2025-01-15 DIAGNOSIS — F33.0 MAJOR DEPRESSIVE DISORDER, RECURRENT EPISODE, MILD (HCC): ICD-10-CM

## 2025-01-15 DIAGNOSIS — F41.1 GENERALIZED ANXIETY DISORDER: ICD-10-CM

## 2025-01-15 DIAGNOSIS — F90.0 ADHD, PREDOMINANTLY INATTENTIVE TYPE: Primary | ICD-10-CM

## 2025-01-15 PROCEDURE — 99214 OFFICE O/P EST MOD 30 MIN: CPT | Performed by: PHYSICIAN ASSISTANT

## 2025-01-15 NOTE — PSYCH
This note was not shared with the patient due to reasonable likelihood of causing patient harm     Virtual Regular Visit    Visit Date: 01/15/25     Verification of patient location:    Patient is located at Other in the following state in which I hold an active license [place]    Reason for visit is   Chief Complaint   Patient presents with    Follow-up    Medication Management    Virtual Regular Visit     Encounter provider Tessa Rojas    Provider located at [address]  [address]  [facility] [address] [phone]    Recent Visits  Date Type Provider Dept   01/10/25 Office Visit Frank Lombardi, DO [facility]   Showing recent visits within past 7 days and meeting all other requirements  Today's Visits  Date Type Provider Dept   01/15/25 Telemedicine Tessa Rojas [facility]   Showing today's visits and meeting all other requirements  Future Appointments  No visits were found meeting these conditions.  Showing future appointments within next 150 days and meeting all other requirements       The patient was identified by name and date of birth. Emil Jacobs was informed that this is a telemedicine visit and that the visit is being conducted through the Epic Embedded platform. He agrees to proceed.. My office door was closed. No one else was in the room. He acknowledged consent and understanding of privacy and security of the video platform. The patient has agreed to participate and understands they can discontinue the visit at any time.    Patient is aware this is a billable service.     Insurance: Payor: BLUE CROSS / Plan: [place] PLAN 280 / Product Type: Blue Fee for Service /      Assessment & Plan  ADHD, predominantly inattentive type  Stable - continue Adderall XR 20 mg qd; f/u in 6 months  Major depressive disorder, recurrent episode, mild (HCC)  Stable - continue talk therapy; f/u  "in 6 months  Generalized anxiety disorder  Stable - continue talk therapy; f/u in 6 months     Pt reports he is doing \"great\" on his current regimen and does not want any changes which is reasonable. We have discussed his safety plan and he agrees that if he experience unsafe thoughts that he will reach out to his supports including this office, the suicide hotline, and emergency services if necessary. Emil is aware of non-emergent and emergent mental health resources. They are able to contract for their own safety at this time.    Will follow up in 6 months. Patient is aware to call the office if questions or concerns arise sooner.     Treatment Recommendations/Precautions:    Continue current medications:    - Adderall XR 20 mg qAM    Does not want any medication changes  Aware of 24 hour and weekend coverage for urgent situations accessed by calling [facility] main practice number  Medication management every 6 months  Continue psychotherapy with [facility] therapist Mar Saab  I am scheduling this patient out for greater than 3 months: Yes - Patient's stability of symptoms warrant this length of time or no significant changes to treatment plan  If sooner appointment needed, patient will reach out    Medications Risks/Benefits      Risks, Benefits And Possible Side Effects Of Medications:    Risks, benefits, and possible side effects of medications explained to Emil and he verbalizes understanding and agreement for treatment.    Controlled Medication Discussion:     Emil has been filling controlled prescriptions on time as prescribed according to [place] Prescription Drug Monitoring Program  The patient understands the risk of treatment with this class of medication. They are aware of the potential for abuse. Patient agrees not to drive or operate heavy machinery if feeling impaired, to take medication as prescribed, to not drink alcohol when taking this medication, and to not " "share this medication with others.     SUBJECTIVE:    Emil is a giselle 49 y.o. male with a history of depression, anxiety, and ADHD who presents today for follow-up and medication management. Since his last visit he states everything is \"great\". He did retire and is now only working part time. He is suing his  for fraud and theft which is somewhat stressful. He continues to work with Mar Saab [facility], and states \"she is the best\".     He denies any suicidal ideation, intent or plan at present; denies any homicidal ideation, intent or plan at present.    He denies any auditory hallucinations, denies any visual hallucinations, denies any delusions.    He denies any side effects from current psychiatric medications.    HPI ROS Appetite Changes and Sleep:     He reports adequate number of sleep hours, adequate appetite, adequate energy level    Current Rating Scores:     None completed today.    Review Of Systems:    Mood euthymic   Behavior appropriate, cooperative, and calm   Thought Content normal   General normal    Personality no change in personality   Other Psych Symptoms normal   Constitutional as noted in HPI   ENT as noted in HPI   Cardiovascular as noted in HPI   Respiratory as noted in HPI   Gastrointestinal as noted in HPI   Genitourinary as noted in HPI   Musculoskeletal as noted in HPI   Integumentary as noted in HPI   Neurological as noted in HPI   Endocrine negative   Other Symptoms none, all other systems are negative     Family Psychiatric History:     Family History   Problem Relation Age of Onset    Breast cancer Mother     No Known Problems Father     Cancer Sister         breast    Breast cancer Sister     Liver cancer Maternal Grandmother     Colon cancer Paternal Grandmother     Mental illness Neg Hx     Diabetes Neg Hx      Social/Substance Abuse History:    Social History     Socioeconomic History    Marital status: /Civil Union     Spouse name: Not on file    " Number of children: Not on file    Years of education: Not on file    Highest education level: Not on file   Occupational History    Not on file   Tobacco Use    Smoking status: Former     Current packs/day: 0.00     Average packs/day: 0.5 packs/day for 5.0 years (2.5 ttl pk-yrs)     Types: Cigarettes     Start date:      Quit date:      Years since quittin.0    Smokeless tobacco: Never   Vaping Use    Vaping status: Never Used   Substance and Sexual Activity    Alcohol use: Yes     Comment: daily    Drug use: No    Sexual activity: Not on file   Other Topics Concern    Not on file   Social History Narrative    Not on file     Social Drivers of Health     Financial Resource Strain: Not on file   Food Insecurity: Not on file   Transportation Needs: Not on file   Physical Activity: Not on file   Stress: Not on file   Social Connections: Not on file   Intimate Partner Violence: Not on file   Housing Stability: Not on file     The following portions of the patient's history were reviewed and updated as appropriate: past family history, past medical history, past social history, past surgical history and problem list.    OBJECTIVE:     Mental Status Evaluation:  Appearance:  dressed appropriately, adequate grooming, looks stated age   Behavior:  pleasant, cooperative, calm, interacts appropriately with this writer   Speech:  normal rate, normal volume, normal pitch   Mood:  euthymic   Affect:  mood-congruent   Thought Process:  organized, logical, coherent   Associations: intact associations   Thought Content:  no overt delusions, no paranoia noted on exam   Perceptual Disturbances: no auditory hallucinations, no visual hallucinations   Risk Potential: Suicidal ideation - None  Homicidal ideation - None  Potential for aggression - No   Sensorium:  oriented to person, place, and time/date   Memory:  recent and remote memory grossly intact   Consciousness:  alert and awake   Attention/Concentration: attention  span and concentration are age appropriate   Insight:  age appropriate   Judgment: age appropriate   Gait/Station: Unable to assess today due to virtual visit   Motor Activity: unable to assess today due to virtual visit      Laboratory Results: I have personally reviewed all pertinent laboratory/tests results    Suicide/Homicide Risk Assessment:    Risk of Harm to Self:  The following ratings are based on assessment at the time of the interview  Based on today's assessment, Emil presents the following risk of harm to self: none    Risk of Harm to Others:  The following ratings are based on assessment at the time of the interview  Based on today's assessment, Emil presents the following risk of harm to others: none    The following interventions are recommended: continue medication management     Psychotherapy Provided:     Individual psychotherapy provided: No     Treatment Plan:    Completed and signed during the session: Not applicable - Treatment Plan to be completed by [facility] therapist     Visit Time    Visit Start Time:  9:00 AM  Visit End Time:  9:10 AM  Total Visit Duration:  10 minutes    Yukodannie Bob 01/15/25      VIRTUAL VISIT DISCLAIMER    Emil Jacobs verbally agrees to participate in Virtual Care Services. Pt is aware that Virtual Care Services could be limited without vital signs or the ability to perform a full hands-on physical exam. Emil Jacobs understands he or the provider may request at any time to terminate the video visit and request the patient to seek care or treatment in person.

## 2025-01-21 LAB
BASOPHILS # BLD AUTO: 0 X10E3/UL (ref 0–0.2)
BASOPHILS NFR BLD AUTO: 1 %
EOSINOPHIL # BLD AUTO: 0.2 X10E3/UL (ref 0–0.4)
EOSINOPHIL NFR BLD AUTO: 3 %
ERYTHROCYTE [DISTWIDTH] IN BLOOD BY AUTOMATED COUNT: 12 % (ref 11.6–15.4)
HCT VFR BLD AUTO: 48.6 % (ref 37.5–51)
HGB BLD-MCNC: 16.4 G/DL (ref 13–17.7)
IMM GRANULOCYTES # BLD: 0 X10E3/UL (ref 0–0.1)
IMM GRANULOCYTES NFR BLD: 0 %
LYMPHOCYTES # BLD AUTO: 2.6 X10E3/UL (ref 0.7–3.1)
LYMPHOCYTES NFR BLD AUTO: 41 %
MCH RBC QN AUTO: 31.2 PG (ref 26.6–33)
MCHC RBC AUTO-ENTMCNC: 33.7 G/DL (ref 31.5–35.7)
MCV RBC AUTO: 92 FL (ref 79–97)
MONOCYTES # BLD AUTO: 0.6 X10E3/UL (ref 0.1–0.9)
MONOCYTES NFR BLD AUTO: 9 %
NEUTROPHILS # BLD AUTO: 3.1 X10E3/UL (ref 1.4–7)
NEUTROPHILS NFR BLD AUTO: 46 %
PLATELET # BLD AUTO: 189 X10E3/UL (ref 150–450)
RBC # BLD AUTO: 5.26 X10E6/UL (ref 4.14–5.8)
TESTOST FREE SERPL-MCNC: 11.9 PG/ML (ref 7.2–24)
TESTOST SERPL-MCNC: 268 NG/DL (ref 264–916)
WBC # BLD AUTO: 6.5 X10E3/UL (ref 3.4–10.8)

## 2025-01-27 ENCOUNTER — CONSULT (OUTPATIENT)
Dept: CARDIOLOGY CLINIC | Facility: CLINIC | Age: 50
End: 2025-01-27
Payer: COMMERCIAL

## 2025-01-27 VITALS
WEIGHT: 277.5 LBS | SYSTOLIC BLOOD PRESSURE: 150 MMHG | HEART RATE: 93 BPM | HEIGHT: 71 IN | BODY MASS INDEX: 38.85 KG/M2 | OXYGEN SATURATION: 98 % | DIASTOLIC BLOOD PRESSURE: 98 MMHG

## 2025-01-27 DIAGNOSIS — E78.2 MIXED HYPERLIPIDEMIA: Primary | ICD-10-CM

## 2025-01-27 DIAGNOSIS — I10 BENIGN ESSENTIAL HYPERTENSION: ICD-10-CM

## 2025-01-27 PROCEDURE — 93000 ELECTROCARDIOGRAM COMPLETE: CPT | Performed by: INTERNAL MEDICINE

## 2025-01-27 PROCEDURE — 99203 OFFICE O/P NEW LOW 30 MIN: CPT | Performed by: INTERNAL MEDICINE

## 2025-01-27 NOTE — PROGRESS NOTES
[facility]  [address] [facility]  [address], [address]  [facility] [id number]      Emil Jacobs  1975  [id number]    Assessment & Plan  Mixed hyperlipidemia  Lipids pend  Benign essential hypertension  F/u after medication compliance  Labs / Echocardiogram pending        Emil Jacobs is a 49 y.o. male who presents seeks evaluation for hypertension patient was placed on valsartan 40 mg daily.  Unfortunately he did not take his valsartan today as he forgot.  Blood pressure was elevated today patient had a coffee prior to the office visit.  Patient also was to get a CMP and lipid profile but that is pending and evaluation of his lipids are unavailable at this time.    He denies chest pain or shortness of breath.  Stress test was performed at 4/4/2018  Whereupon exercised for 11 minutes on the Elfego protocol 12.8 METS with a normal ST-T response to exercise    I discussed with him importance of weight loss his BMI is 38 , moderation in alcohol intake , and a low-salt diet.  He also will make attempts to increase his exercise.    EKG NSR Normal EKG    1.Hypertension: noncompliance resume Valsartan 40mg assess response adjust if necessary on f/u  2.Echocardiogram for LV function/LVH  3.CMP. Lipids pends  4.Risk factor reduction discussed      Continue all medications. Previous studies reviewed with patient, medications reviewed and possible side effects discussed. Continue risk factor modification. Optimize weight, regular exercise and follow up with appropriate specialists and primary care physician as discussed.  All questions answered. Patient advised to report any problems prompting to medical attention. Return for follow up visit in 1mo or earlier if needed    Chief Complaint   Patient presents with    Consult         Objective           PMH:     Patient Active Problem List   Diagnosis    Sleep apnea    GERD (gastroesophageal reflux disease)    CPAP (continuous positive airway pressure) dependence     Chronic pain disorder    Benign essential hypertension    ADHD, predominantly inattentive type    Primary osteoarthritis of both knees    Mixed hyperlipidemia    Abnormal glucose    Low testosterone in male    Decreased sex drive    Major depressive disorder, recurrent episode, mild (HCC)    Generalized anxiety disorder    Tendonitis, Achilles, right     Past Medical History:   Diagnosis Date    Chronic pain disorder     lumbar     CPAP (continuous positive airway pressure) dependence     GERD (gastroesophageal reflux disease)     diet controlled    Helicobacter pylori gastritis 2017    Sleep apnea     does not wear CPAP     Social History     Socioeconomic History    Marital status: /Civil Union     Spouse name: Not on file    Number of children: Not on file    Years of education: Not on file    Highest education level: Not on file   Occupational History    Not on file   Tobacco Use    Smoking status: Former     Current packs/day: 0.00     Average packs/day: 0.5 packs/day for 5.0 years (2.5 ttl pk-yrs)     Types: Cigarettes     Start date:      Quit date:      Years since quittin.0    Smokeless tobacco: Never   Vaping Use    Vaping status: Never Used   Substance and Sexual Activity    Alcohol use: Yes     Comment: daily    Drug use: No    Sexual activity: Not on file   Other Topics Concern    Not on file   Social History Narrative    Not on file     Social Drivers of Health     Financial Resource Strain: Not on file   Food Insecurity: Not on file   Transportation Needs: Not on file   Physical Activity: Not on file   Stress: Not on file   Social Connections: Not on file   Intimate Partner Violence: Not on file   Housing Stability: Not on file      Family History   Problem Relation Age of Onset    Breast cancer Mother     No Known Problems Father     Cancer Sister         breast    Breast cancer Sister     Liver cancer Maternal Grandmother     Colon cancer Paternal Grandmother     Mental  illness Neg Hx     Diabetes Neg Hx      Past Surgical History:   Procedure Laterality Date    COLONOSCOPY N/A 4/7/2017    Procedure: COLONOSCOPY;  Surgeon: Lacey Dennis MD;  Location: [facility] GI LAB;  Service:     ESOPHAGOGASTRODUODENOSCOPY N/A 4/7/2017    Procedure: ESOPHAGOGASTRODUODENOSCOPY (EGD);  Surgeon: Lacey Dennis MD;  Location: [facility] GI LAB;  Service:     FL INJECTION RIGHT SHOULDER (ARTHROGRAM)  4/1/2024    KNEE ARTHROSCOPY Left     x4 and ACL repair    KNEE ARTHROSCOPY Right     x2 and ACL repair    KNEE ARTHROSCOPY W/ AUTOGENOUS CARTILAGE IMPLANTATION (ACI) PROCEDURE Left     SINUS SURGERY      TONSILLECTOMY      UPPER GASTROINTESTINAL ENDOSCOPY         Current Outpatient Medications:     amphetamine-dextroamphetamine (ADDERALL XR, 20MG,) 20 MG 24 hr capsule, Take 1 capsule (20 mg total) by mouth every morning Max Daily Amount: 20 mg, Disp: 30 capsule, Rfl: 0    ciprofloxacin-dexamethasone (CIPRODEX) otic suspension, Administer 4 drops into the left ear 2 (two) times a day, Disp: 7.5 mL, Rfl: 1    tadalafil (CIALIS) 20 MG tablet, Take 1 tablet (20 mg total) by mouth daily as needed for erectile dysfunction, Disp: 30 tablet, Rfl: 5    testosterone cypionate (DEPO-TESTOSTERONE) 200 mg/mL SOLN, INJECT 0.25 ML (50 MG TOTAL) INTO A MUSCLE 2 (TWO) TIMES A WEEK, Disp: 10 mL, Rfl: 0    valsartan (DIOVAN) 40 mg tablet, Take 1 tablet (40 mg total) by mouth daily, Disp: 90 tablet, Rfl: 1  Allergies   Allergen Reactions    Doxycycline Itching    Tetracycline Rash     Severe itching         Review of Systems   Constitutional:  Negative for chills and fever.   HENT:  Negative for ear pain and sore throat.    Eyes:  Negative for pain and visual disturbance.   Respiratory:  Negative for cough and shortness of breath.    Cardiovascular:  Negative for chest pain and palpitations.   Gastrointestinal:  Negative for abdominal pain and vomiting.   Genitourinary:  Negative for dysuria and hematuria.   Musculoskeletal:   "Negative for arthralgias and back pain.   Skin:  Negative for color change and rash.   Neurological:  Negative for seizures and syncope.   Psychiatric/Behavioral:  The patient is nervous/anxious.    All other systems reviewed and are negative.      /98 (BP Location: Right arm, Patient Position: Sitting, Cuff Size: Large)   Pulse 93   Ht 5' 11\" (1.803 m)   Wt 126 kg (277 lb 8 oz)   SpO2 98%   BMI 38.70 kg/m²     Physical Exam  Constitutional:       Appearance: Normal appearance.   HENT:      Head: Normocephalic and atraumatic.   Cardiovascular:      Rate and Rhythm: Normal rate and regular rhythm.      Pulses: Normal pulses.      Heart sounds: Normal heart sounds.   Pulmonary:      Effort: Pulmonary effort is normal.      Breath sounds: Normal breath sounds.   Musculoskeletal:         General: Normal range of motion.      Cervical back: Normal range of motion and neck supple.   Skin:     General: Skin is warm and dry.   Neurological:      General: No focal deficit present.      Mental Status: He is alert and oriented to person, place, and time.   Psychiatric:         Mood and Affect: Mood normal.         Behavior: Behavior normal.       "

## 2025-02-26 DIAGNOSIS — F90.0 ADHD, PREDOMINANTLY INATTENTIVE TYPE: ICD-10-CM

## 2025-02-26 RX ORDER — DEXTROAMPHETAMINE SACCHARATE, AMPHETAMINE ASPARTATE MONOHYDRATE, DEXTROAMPHETAMINE SULFATE AND AMPHETAMINE SULFATE 5; 5; 5; 5 MG/1; MG/1; MG/1; MG/1
20 CAPSULE, EXTENDED RELEASE ORAL EVERY MORNING
Qty: 30 CAPSULE | Refills: 0 | Status: SHIPPED | OUTPATIENT
Start: 2025-02-26

## 2025-02-26 NOTE — TELEPHONE ENCOUNTER
Reason for call:   [x] Refill   [] Prior Auth  [] Other:     Office:   [] PCP/Provider -   [x] Specialty/Provider -  PS[facility]CHIATRIC [facility]  Authorized By: Tessa Rojas      Medication: amphetamine-dextroamphetamine (ADDERALL XR, 20MG,) 20 MG 24 hr capsule    Dose/Frequency: Take 1 capsule (20 mg total) by mouth    Quantity: 30 capsule    Pharmacy: [address]     Does the patient have enough for 3 days?   [x] Yes   [] No - Send as HP to POD

## 2025-04-04 DIAGNOSIS — N52.9 ED (ERECTILE DYSFUNCTION) OF ORGANIC ORIGIN: ICD-10-CM

## 2025-04-04 DIAGNOSIS — E29.1 HYPOGONADISM IN MALE: ICD-10-CM

## 2025-04-04 DIAGNOSIS — I10 BENIGN ESSENTIAL HYPERTENSION: ICD-10-CM

## 2025-04-04 RX ORDER — VALSARTAN 40 MG/1
40 TABLET ORAL DAILY
Qty: 90 TABLET | Refills: 0 | Status: SHIPPED | OUTPATIENT
Start: 2025-04-04 | End: 2025-10-01

## 2025-04-04 NOTE — TELEPHONE ENCOUNTER
Patient is request Syringes as well and would like them to big a little bigger in gauge, maybe a 23 or 22 gauge.  Please send to Preferred pharmacy: [facility] [id number] [place] [address]

## 2025-04-07 DIAGNOSIS — E29.1 HYPOGONADISM IN MALE: Primary | ICD-10-CM

## 2025-04-07 RX ORDER — TESTOSTERONE CYPIONATE 200 MG/ML
50 INJECTION, SOLUTION INTRAMUSCULAR 2 TIMES WEEKLY
Qty: 5 ML | Refills: 0 | Status: SHIPPED | OUTPATIENT
Start: 2025-04-07

## 2025-04-08 ENCOUNTER — TELEPHONE (OUTPATIENT)
Dept: UROLOGY | Facility: CLINIC | Age: 50
End: 2025-04-08

## 2025-04-08 DIAGNOSIS — E29.1 HYPOGONADISM IN MALE: Primary | ICD-10-CM

## 2025-04-08 RX ORDER — SYRINGE AND NEEDLE,INSULIN,1ML 25GX1"
SYRINGE, EMPTY DISPOSABLE MISCELLANEOUS
Qty: 10 EACH | Refills: 3 | Status: SHIPPED | OUTPATIENT
Start: 2025-04-08

## 2025-04-08 NOTE — TELEPHONE ENCOUNTER
"[facility] called the RX Refill Line. Message is being forwarded to the office.     Pharmacy is requesting a refill for patients BD syringes 25g x 5/8\"  They are not on patients current medication list. Please review. Pharmacy stated that the patient called them  for the refill. Please review and send refill to   [place] [id number] [place] [address]  if appropriate        "

## 2025-04-10 DIAGNOSIS — F90.0 ADHD, PREDOMINANTLY INATTENTIVE TYPE: ICD-10-CM

## 2025-04-10 RX ORDER — DEXTROAMPHETAMINE SACCHARATE, AMPHETAMINE ASPARTATE MONOHYDRATE, DEXTROAMPHETAMINE SULFATE AND AMPHETAMINE SULFATE 5; 5; 5; 5 MG/1; MG/1; MG/1; MG/1
20 CAPSULE, EXTENDED RELEASE ORAL EVERY MORNING
Qty: 30 CAPSULE | Refills: 0 | Status: SHIPPED | OUTPATIENT
Start: 2025-04-10

## 2025-04-10 NOTE — TELEPHONE ENCOUNTER
Reason for call:   [x] Refill   [] Prior Auth  [] Other:     Office:   [] PCP/Provider -   [x] Specialty/Provider - Psych    Medication:   - Amphetamine-dextroamphetamine 20mg- take 1 capsule by mouth every morning       [facility]    Local Pharmacy   Does the patient have enough for 3 days?   [x] Yes   [] No - Send as HP to POD    Mail Away Pharmacy   Does the patient have enough for 10 days?   [] Yes   [] No - Send as HP to POD

## 2025-05-12 ENCOUNTER — TELEPHONE (OUTPATIENT)
Age: 50
End: 2025-05-12

## 2025-05-12 NOTE — TELEPHONE ENCOUNTER
Patient is requesting a syringe with a gauge that makes it easier to draw the testosterone solution. He said it was too hard to draw the solution with the previous refill that he got. He doesn't mind having a thicker needle as long as he can draw the solution more easily and accurately.    Please send appropriate [facility] [id number] [place] [address] [address]

## 2025-05-13 DIAGNOSIS — E29.1 HYPOGONADISM IN MALE: Primary | ICD-10-CM

## 2025-05-13 RX ORDER — SYRINGE W-NEEDLE,DISPOSAB,3 ML 25GX5/8"
SYRINGE, EMPTY DISPOSABLE MISCELLANEOUS AS NEEDED
Qty: 50 EACH | Refills: 0 | Status: SHIPPED | OUTPATIENT
Start: 2025-05-13

## 2025-05-13 NOTE — PROGRESS NOTES
Patient sent message through portal that the 25-gauge needle for his testosterone injection was two small and may injections more difficult.  Has no follow-up with urology in [facility] at current time.  3 mL syringe with 22-gauge 1 inch needle prescribed to [facility] pharmacy.  Patient to have follow-up visit with urology and labs prior including testosterone, CBC, and CMP as previously ordered.  Referral made to endocrinology for long-term testosterone replacement

## 2025-05-14 NOTE — TELEPHONE ENCOUNTER
Emil Troncoso PA-C to [facility] (Selected Message)  EZ      5/13/25  3:57 PM  Patient sent message through portal that the 25-gauge needle for his testosterone injection was two small and may injections more difficult.  Has no follow-up with urology in [facility] at current time.  3 mL syringe with 22-gauge 1 inch needle prescribed to [facility] pharmacy.  Please arrange follow-up with urology with labs prior including testosterone, CBC, and CMP as previously ordered.  Referral made to endocrinology for long-term testosterone replacement            Called and spoke with pt. Explained Emil's message. Pt verbalized understanding and said he will get the labs done in the next few weeks. I did try to schedule a f/u for him, he declined at this time and stated he would give the office a call back once he had his calendar with him. Advised pt to call the office back to schedule f/u.

## 2025-05-15 ENCOUNTER — TELEPHONE (OUTPATIENT)
Age: 50
End: 2025-05-15

## 2025-05-15 NOTE — TELEPHONE ENCOUNTER
Called patient per referral from Urology. Patient is already an established patient of Dr. France last seen 2/8/2024. Call rang and seemed to have been picked up but then disconnected right away. Unable to leave a message. Called to schedule follow up appointment.

## 2025-05-23 ENCOUNTER — DOCUMENTATION (OUTPATIENT)
Dept: BEHAVIORAL/MENTAL HEALTH CLINIC | Facility: CLINIC | Age: 50
End: 2025-05-23

## 2025-05-23 DIAGNOSIS — F41.1 GENERALIZED ANXIETY DISORDER: ICD-10-CM

## 2025-05-23 DIAGNOSIS — F90.0 ADHD, PREDOMINANTLY INATTENTIVE TYPE: ICD-10-CM

## 2025-05-23 DIAGNOSIS — F33.0 MAJOR DEPRESSIVE DISORDER, RECURRENT EPISODE, MILD (HCC): Primary | ICD-10-CM

## 2025-05-23 NOTE — PROGRESS NOTES
Psychotherapy Discharge Summary    Preferred Name: Emil Jacobs  YOB: 1975    Admission date to psychotherapy: 2/21/2024    Referred by: MARKUS Wallis    Presenting Problem: Depression, family issues    Course of treatment included : individual therapy     Progress/Outcome of Treatment Goals (brief summary of course of treatment) discussed relationships with family particularly wife.  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  Difficulty keeping focus at times but did accept feedback and suggestions during course of treatment.  Discontinued coming when he had a new job and was unable to schedule subsequent sessions.    Treatment Complications (if any): Scheduling and availability    Treatment Progress: fair    Current [facility]iatric Provider: MARKUS Iyer    Discharge Medications include: Refer to record    Discharge Date: 5/23/2025    Discharge Diagnosis:   1. Major depressive disorder, recurrent episode, mild (HCC)        2. Generalized anxiety disorder        3. ADHD, predominantly inattentive type            Criteria for Discharge: two or more unexcused absences for services    Patient is cleared to return to [facility] for continued treatment.    Rationale: If he can commit to regular attendance I will resume sessions    Aftercare recommendations include (include specific referral names and phone numbers, if appropriate): Medication management    Prognosis: good

## 2025-06-11 DIAGNOSIS — F90.0 ADHD, PREDOMINANTLY INATTENTIVE TYPE: ICD-10-CM

## 2025-06-11 RX ORDER — DEXTROAMPHETAMINE SACCHARATE, AMPHETAMINE ASPARTATE MONOHYDRATE, DEXTROAMPHETAMINE SULFATE AND AMPHETAMINE SULFATE 5; 5; 5; 5 MG/1; MG/1; MG/1; MG/1
20 CAPSULE, EXTENDED RELEASE ORAL EVERY MORNING
Qty: 30 CAPSULE | Refills: 0 | Status: SHIPPED | OUTPATIENT
Start: 2025-06-11

## 2025-06-11 NOTE — TELEPHONE ENCOUNTER
Patient called requesting refill for testosterone cypionate (DEPO-TESTOSTERONE) 200 mg/mL SOLN . Patient made aware medication was refilled on 4/7/25 for 5ml with [facility]. Patient instructed to contact the pharmacy and speak with someone directly to obtain refills of medication. Patient advised to call back for refill if their pharmacy is unable to assist them. Patient verbalized understanding.

## 2025-06-11 NOTE — TELEPHONE ENCOUNTER
Reason for call:   [x] Refill   [] Prior Auth  [] Other:     Office:   [] PCP/Provider -   [x] Specialty/Provider - Tessa Rojas     Medication: amphetamine-dextroamphetamine (ADDERALL XR, 20MG,) 20 MG 24 hr capsule     Dose/Frequency: Take 1 capsule (20 mg total) by mouth every morning Max Daily Amount: 20 mg     Quantity: 30    Pharmacy: [facility]    Local Pharmacy   Does the patient have enough for 3 days?   [x] Yes   [] No - Send as HP to POD

## 2025-06-16 ENCOUNTER — TELEPHONE (OUTPATIENT)
Dept: UROLOGY | Facility: AMBULATORY SURGERY CENTER | Age: 50
End: 2025-06-16

## 2025-06-16 NOTE — TELEPHONE ENCOUNTER
PA for TESTOSTERONE  MG SUBMITTED to [facility]    via    [x]CMM-KEY:  [id number]      [x]PA sent as URGENT    All office notes, labs and other pertaining documents and studies sent. Clinical questions answered. Awaiting determination from insurance company.     Turnaround time for your insurance to make a decision on your Prior Authorization can take 7-21 business days.

## 2025-06-16 NOTE — TELEPHONE ENCOUNTER
[facility] [id number] [place] [facility] [place], [place] - [address]          testosterone cypionate (DEPO-TESTOSTERONE) 200 mg/mL SOLN

## 2025-06-16 NOTE — TELEPHONE ENCOUNTER
PA for TESTOSTERONE  MG  APPROVED     Date(s) approved UNTIL 06/16/2026    Case #    Patient advised by          []MyChart Message  []Phone call   [x]LMOM  []L/M to call office as no active Communication consent on file  []Unable to leave detailed message as VM not approved on Communication consent       Pharmacy advised by    [x]Fax  []Phone call  []Secure Chat    Specialty Pharmacy    []     Approval letter scanned into Media Yes

## 2025-07-03 DIAGNOSIS — I10 BENIGN ESSENTIAL HYPERTENSION: ICD-10-CM

## 2025-07-03 RX ORDER — VALSARTAN 40 MG/1
40 TABLET ORAL DAILY
Qty: 90 TABLET | Refills: 0 | Status: SHIPPED | OUTPATIENT
Start: 2025-07-03

## 2025-07-16 ENCOUNTER — TELEPHONE (OUTPATIENT)
Dept: PSYCHIATRY | Facility: CLINIC | Age: 50
End: 2025-07-16

## 2025-07-17 ENCOUNTER — TELEPHONE (OUTPATIENT)
Dept: UROLOGY | Facility: AMBULATORY SURGERY CENTER | Age: 50
End: 2025-07-17

## 2025-07-17 DIAGNOSIS — E29.1 HYPOGONADISM IN MALE: ICD-10-CM

## 2025-07-17 RX ORDER — SYRINGE W-NEEDLE,DISPOSAB,3 ML 25GX5/8"
SYRINGE, EMPTY DISPOSABLE MISCELLANEOUS AS NEEDED
Qty: 50 EACH | Refills: 0 | Status: SHIPPED | OUTPATIENT
Start: 2025-07-17

## 2025-07-17 NOTE — TELEPHONE ENCOUNTER
[facility] ulticare safety 3ml 22Gx1    Scanned in because I'm not familiar if this is the same thing as on the medication lists.     Please review

## 2025-07-18 ENCOUNTER — TELEPHONE (OUTPATIENT)
Age: 50
End: 2025-07-18

## 2025-07-18 NOTE — TELEPHONE ENCOUNTER
Hello,    Please advise if a forced appointment can be accommodated for the patient: Emil    Call back #: [phone]    Insurance: [employer] "[employer]    Reason for appointment: ingrown nail lt great toe    Requested doctor and/or location: Dr AGUILAR [place]      Thank you.

## 2025-07-21 NOTE — TELEPHONE ENCOUNTER
Spoke with patient in regards to scheduled appt for his ingrown nail , patient stated he called Friday to scheduled appointment  but got nothing back   , at this time he stated he doesn't need appt at this time he stated he has taken care of this , if he needs anything he will reach out

## 2025-07-21 NOTE — TELEPHONE ENCOUNTER
Deejaym regarding apt for today.  If he cannot make this time Lala please assist in adding him to Dr. Brown's schedule later this week.

## (undated) DEVICE — "MH-443 SUCTION VALVE F/EVIS140 EVIS160": Brand: SUCTION VALVE

## (undated) DEVICE — Device: Brand: OLYMPUS

## (undated) DEVICE — DISPOSABLE BIOPSY VALVE MAJ-1555: Brand: SINGLE USE BIOPSY VALVE (STERILE)

## (undated) DEVICE — GROUNDING PAD UNIVERSAL SLW

## (undated) DEVICE — SNARE BARBED 230CM

## (undated) DEVICE — BITE BLOCK ENDO 60FR ADLT MAXI  DISP W/STRAP

## (undated) DEVICE — SINGLE-USE BIOPSY FORCEPS: Brand: RADIAL JAW 4

## (undated) DEVICE — BRUSH CYTOLOGY 3 MM 240 CM

## (undated) DEVICE — SINGLE-USE POLYPECTOMY SNARE: Brand: SENSATION SHORT THROW

## (undated) DEVICE — MARKER SPOT EX  BOWEL TATTOO SYRINGE

## (undated) DEVICE — TUBING AUX CHANNEL

## (undated) DEVICE — "MH-438 A/W VLVE F/140 EVIS-140": Brand: AIR/WATER VALVE

## (undated) DEVICE — "MB-142 MOUTHPIECE": Brand: MOUTHPIECE

## (undated) DEVICE — TRAVELKIT CONTAINS FIRST STEP KIT (200ML EP-4 KIT) AND SOILED SCOPE BAG - 1 KIT: Brand: TRAVELKIT CONTAINS FIRST STEP KIT AND SOILED SCOPE BAG

## (undated) DEVICE — AIRLIFE™  ADULT CUSHION NASAL CANNULA WITH 7 FOOT (2.1 M) CRUSH-RESISTANT OXYGEN TUBING, AND U/CONNECT-IT ADAPTER: Brand: AIRLIFE™

## (undated) DEVICE — 60 ML SYRINGE,REGULAR TIP: Brand: MONOJECT

## (undated) DEVICE — SOLIDIFIER FLUID WASTE CONTROL 1500ML

## (undated) DEVICE — LUBRICANT SURGILUBE TUBE 4 OZ  FLIP TOP

## (undated) DEVICE — "MAJ-901 WATER CONTAINER SET CV-160/140": Brand: WATER CONTAINER

## (undated) DEVICE — 1200CC GUARDIAN II: Brand: GUARDIAN

## (undated) DEVICE — GLOVE EXAM NON-STRL NTRL PLUS LRG PF

## (undated) DEVICE — BAG SPECIMEN BIOHAZARD 10 X 10 ADHESIVE

## (undated) DEVICE — TRAP POLY

## (undated) DEVICE — TUBING BUBBLE CLEAR 5MM X 100 FT NS

## (undated) DEVICE — MEDI-VAC YANKAUER SUCTION HANDLE: Brand: CARDINAL HEALTH

## (undated) DEVICE — BRUSH ENDO CLEANING DBL-HEADER

## (undated) DEVICE — GAUZE SPONGES,16 PLY: Brand: CURITY